# Patient Record
Sex: FEMALE | Race: WHITE | Employment: FULL TIME | URBAN - METROPOLITAN AREA
[De-identification: names, ages, dates, MRNs, and addresses within clinical notes are randomized per-mention and may not be internally consistent; named-entity substitution may affect disease eponyms.]

---

## 2017-02-05 ENCOUNTER — OFFICE VISIT (OUTPATIENT)
Dept: FAMILY MEDICINE CLINIC | Facility: CLINIC | Age: 42
End: 2017-02-05

## 2017-02-05 VITALS
RESPIRATION RATE: 18 BRPM | OXYGEN SATURATION: 98 % | WEIGHT: 180 LBS | DIASTOLIC BLOOD PRESSURE: 62 MMHG | TEMPERATURE: 99 F | HEART RATE: 107 BPM | SYSTOLIC BLOOD PRESSURE: 104 MMHG | BODY MASS INDEX: 30 KG/M2

## 2017-02-05 DIAGNOSIS — J02.0 STREP PHARYNGITIS: Primary | ICD-10-CM

## 2017-02-05 LAB
CONTROL LINE PRESENT WITH A CLEAR BACKGROUND (YES/NO): YES YES/NO
STREP GRP A CUL-SCR: POSITIVE

## 2017-02-05 PROCEDURE — 99213 OFFICE O/P EST LOW 20 MIN: CPT | Performed by: NURSE PRACTITIONER

## 2017-02-05 PROCEDURE — 87880 STREP A ASSAY W/OPTIC: CPT | Performed by: NURSE PRACTITIONER

## 2017-02-05 RX ORDER — AMOXICILLIN AND CLAVULANATE POTASSIUM 875; 125 MG/1; MG/1
1 TABLET, FILM COATED ORAL 2 TIMES DAILY
Qty: 20 TABLET | Refills: 0 | Status: SHIPPED | OUTPATIENT
Start: 2017-02-05 | End: 2017-02-15

## 2017-02-05 RX ORDER — CODEINE PHOSPHATE AND GUAIFENESIN 10; 100 MG/5ML; MG/5ML
SOLUTION ORAL
Qty: 180 ML | Refills: 0 | Status: SHIPPED | OUTPATIENT
Start: 2017-02-05 | End: 2017-02-14 | Stop reason: ALTCHOICE

## 2017-02-05 NOTE — PATIENT INSTRUCTIONS
Pharyngitis: Strep (Confirmed)       You have had a positive test for strep throat. Strep throat is a contagious illness. It is spread by coughing, kissing or by touching others after touching your mouth or nose.  Symptoms include throat pain which is wor · Inability to swallow liquids, excessive drooling, or inability to open mouth wide due to throat pain  · Signs of dehydration (very dark urine or no urine, sunken eyes, dizziness)  · Trouble breathing or noisy breathing  · Muffled voice  · New rash  Date

## 2017-02-05 NOTE — PROGRESS NOTES
CHIEF COMPLAINT:   Patient presents with:  Sore Throat  Fever      HPI:   Fabienne Archer is a 37year old female presents to clinic with symptoms of sore throat. Patient has had for 2 days. Symptoms have worsening since onset.   Patient reports following HEENT: denies ear pain or difficulty swallowing/eating, See HPI  RESPIRATORY: denies shortness of breath, or wheezing  CARDIOVASCULAR: denies chest pain, palpitations   GI: denies abdominal pain, constipation and diarrhea  NEURO: denies dizziness or lighth You have had a positive test for strep throat. Strep throat is a contagious illness. It is spread by coughing, kissing or by touching others after touching your mouth or nose.  Symptoms include throat pain which is worse with swallowing, aching all over, he · Inability to swallow liquids, excessive drooling, or inability to open mouth wide due to throat pain  · Signs of dehydration (very dark urine or no urine, sunken eyes, dizziness)  · Trouble breathing or noisy breathing  · Muffled voice  · New rash  Date

## 2017-02-14 ENCOUNTER — OFFICE VISIT (OUTPATIENT)
Dept: FAMILY MEDICINE CLINIC | Facility: CLINIC | Age: 42
End: 2017-02-14

## 2017-02-14 VITALS
TEMPERATURE: 98 F | HEART RATE: 68 BPM | RESPIRATION RATE: 16 BRPM | BODY MASS INDEX: 30 KG/M2 | DIASTOLIC BLOOD PRESSURE: 84 MMHG | WEIGHT: 178 LBS | SYSTOLIC BLOOD PRESSURE: 118 MMHG

## 2017-02-14 DIAGNOSIS — M79.602 PAIN IN BOTH UPPER EXTREMITIES: Primary | ICD-10-CM

## 2017-02-14 DIAGNOSIS — M79.642 PAIN IN BOTH HANDS: ICD-10-CM

## 2017-02-14 DIAGNOSIS — M25.532 PAIN IN BOTH WRISTS: ICD-10-CM

## 2017-02-14 DIAGNOSIS — M79.641 PAIN IN BOTH HANDS: ICD-10-CM

## 2017-02-14 DIAGNOSIS — M79.601 PAIN IN BOTH UPPER EXTREMITIES: Primary | ICD-10-CM

## 2017-02-14 DIAGNOSIS — M25.531 PAIN IN BOTH WRISTS: ICD-10-CM

## 2017-02-14 PROCEDURE — 99213 OFFICE O/P EST LOW 20 MIN: CPT | Performed by: FAMILY MEDICINE

## 2017-02-14 NOTE — PROGRESS NOTES
Martine Alas is a 37year old female. CHIEF COMPLAINT   Ongoing problems with arms. HPI:   Works as a massage therapist and doing facials for 17 years. Has done PT/OT in the past for pain in arms/hands - didn't get much relief.   Pain primarily in fore hands  Pain in both wrists    No orders of the defined types were placed in this encounter.        Meds & Refills for this Visit:  No prescriptions requested or ordered in this encounter    Imaging & Consults:  ORTHOPEDIC - INTERNAL   (refer to physiatry -

## 2017-02-23 ENCOUNTER — APPOINTMENT (OUTPATIENT)
Dept: LAB | Age: 42
End: 2017-02-23
Attending: FAMILY MEDICINE
Payer: COMMERCIAL

## 2017-02-23 DIAGNOSIS — E55.9 VITAMIN D DEFICIENCY: ICD-10-CM

## 2017-02-23 LAB — 25-HYDROXYVITAMIN D (TOTAL): 49.5 NG/ML (ref 30–100)

## 2017-02-23 PROCEDURE — 36415 COLL VENOUS BLD VENIPUNCTURE: CPT

## 2017-02-23 PROCEDURE — 82306 VITAMIN D 25 HYDROXY: CPT

## 2017-02-27 DIAGNOSIS — R79.89 LOW VITAMIN D LEVEL: Primary | ICD-10-CM

## 2017-05-19 ENCOUNTER — TELEPHONE (OUTPATIENT)
Dept: FAMILY MEDICINE CLINIC | Facility: CLINIC | Age: 42
End: 2017-05-19

## 2017-05-19 NOTE — TELEPHONE ENCOUNTER
Call from pt requesting appt with cori TAVERAS.  sts \"have had indigestion/heartburn at times for at least the past year but it has been worse the past 2 months-more constant-not full blown intolerable at this point, but uncomfortable.  Was at that point c

## 2017-05-22 ENCOUNTER — OFFICE VISIT (OUTPATIENT)
Dept: FAMILY MEDICINE CLINIC | Facility: CLINIC | Age: 42
End: 2017-05-22

## 2017-05-22 VITALS
DIASTOLIC BLOOD PRESSURE: 80 MMHG | TEMPERATURE: 98 F | BODY MASS INDEX: 30.32 KG/M2 | WEIGHT: 182 LBS | HEART RATE: 64 BPM | RESPIRATION RATE: 12 BRPM | HEIGHT: 65 IN | SYSTOLIC BLOOD PRESSURE: 110 MMHG

## 2017-05-22 DIAGNOSIS — R10.13 EPIGASTRIC PAIN: ICD-10-CM

## 2017-05-22 DIAGNOSIS — K21.9 GASTROESOPHAGEAL REFLUX DISEASE WITHOUT ESOPHAGITIS: Primary | ICD-10-CM

## 2017-05-22 PROCEDURE — 99214 OFFICE O/P EST MOD 30 MIN: CPT | Performed by: FAMILY MEDICINE

## 2017-05-22 RX ORDER — ESOMEPRAZOLE MAGNESIUM 40 MG/1
40 CAPSULE, DELAYED RELEASE ORAL DAILY
Qty: 30 CAPSULE | Refills: 1 | Status: SHIPPED | OUTPATIENT
Start: 2017-05-22 | End: 2017-08-20

## 2017-05-22 NOTE — PROGRESS NOTES
Srini Barnhart is a 37year old female. CHIEF COMPLAINT   \"heartburn\"  HPI:   Historically will get GERD symptoms 5x per year. 1 month ago had it for about 6 hours - was so severe that she was pacing and sweating. Feels bloated \"all the time\".   Fee reflux disease without esophagitis  (primary encounter diagnosis)  Epigastric pain    No orders of the defined types were placed in this encounter.        Meds & Refills for this Visit:  Signed Prescriptions Disp Refills    Esomeprazole Magnesium (NEXIUM) 4

## 2017-06-01 ENCOUNTER — HOSPITAL ENCOUNTER (OUTPATIENT)
Dept: ULTRASOUND IMAGING | Facility: HOSPITAL | Age: 42
Discharge: HOME | End: 2017-06-01
Attending: FAMILY MEDICINE
Payer: COMMERCIAL

## 2017-06-01 DIAGNOSIS — R10.13 EPIGASTRIC PAIN: ICD-10-CM

## 2017-06-01 PROCEDURE — 76700 US EXAM ABDOM COMPLETE: CPT | Performed by: FAMILY MEDICINE

## 2017-08-20 ENCOUNTER — OFFICE VISIT (OUTPATIENT)
Dept: FAMILY MEDICINE CLINIC | Facility: CLINIC | Age: 42
End: 2017-08-20

## 2017-08-20 DIAGNOSIS — J01.00 ACUTE NON-RECURRENT MAXILLARY SINUSITIS: Primary | ICD-10-CM

## 2017-08-20 PROCEDURE — 99213 OFFICE O/P EST LOW 20 MIN: CPT | Performed by: NURSE PRACTITIONER

## 2017-08-20 RX ORDER — AMOXICILLIN AND CLAVULANATE POTASSIUM 875; 125 MG/1; MG/1
1 TABLET, FILM COATED ORAL 2 TIMES DAILY
Qty: 20 TABLET | Refills: 0 | Status: SHIPPED | OUTPATIENT
Start: 2017-08-20 | End: 2017-08-30

## 2017-08-20 NOTE — PATIENT INSTRUCTIONS
Sinusitis (Antibiotic Treatment)    The sinuses are air-filled spaces within the bones of the face. They connect to the inside of the nose. Sinusitis is an inflammation of the tissue lining the sinus cavity. Sinus inflammation can occur during a cold.  It your health care provider. Rinsing may spread the infection to other sinuses. · Use acetaminophen or ibuprofen to control pain, unless another pain medicine was prescribed.  (If you have chronic liver or kidney disease or ever had a stomach ulcer, talk wit

## 2017-08-21 VITALS
TEMPERATURE: 99 F | DIASTOLIC BLOOD PRESSURE: 70 MMHG | SYSTOLIC BLOOD PRESSURE: 108 MMHG | HEART RATE: 72 BPM | RESPIRATION RATE: 16 BRPM

## 2017-08-22 NOTE — PROGRESS NOTES
CHIEF COMPLAINT:   \" Patient presents with:  Cold    HPI:   Wiley Cox is a 37year old female who presents with hx of cold sx which progressed to Maxillary sinus congestion and pressure and ear congestion and pressure.   Pt has been unable to blow out normocephalic. EYES: conjunctiva clear, EOM intact  EARS: TM's are fluid-filled, bulging and non-injected, bilaterally  NOSE: No exudates, nasal mucosa is erythematous and swollen.   Tender Maxillary sinuses  THROAT: Oral mucosa pink, moist. Posterior pha

## 2017-09-05 RX ORDER — ETHINYL ESTRADIOL AND LEVONORGESTREL 150-30(84)
KIT ORAL
Qty: 91 TABLET | Refills: 0 | Status: SHIPPED | OUTPATIENT
Start: 2017-09-05 | End: 2017-10-12

## 2017-10-12 ENCOUNTER — OFFICE VISIT (OUTPATIENT)
Dept: OBGYN CLINIC | Facility: CLINIC | Age: 42
End: 2017-10-12

## 2017-10-12 VITALS
HEIGHT: 65 IN | BODY MASS INDEX: 30.16 KG/M2 | WEIGHT: 181 LBS | DIASTOLIC BLOOD PRESSURE: 64 MMHG | RESPIRATION RATE: 18 BRPM | HEART RATE: 72 BPM | SYSTOLIC BLOOD PRESSURE: 112 MMHG

## 2017-10-12 DIAGNOSIS — Z01.419 ENCOUNTER FOR WELL WOMAN EXAM WITH ROUTINE GYNECOLOGICAL EXAM: Primary | ICD-10-CM

## 2017-10-12 DIAGNOSIS — Z12.39 BREAST CANCER SCREENING: ICD-10-CM

## 2017-10-12 PROCEDURE — 88175 CYTOPATH C/V AUTO FLUID REDO: CPT | Performed by: OBSTETRICS & GYNECOLOGY

## 2017-10-12 PROCEDURE — 99396 PREV VISIT EST AGE 40-64: CPT | Performed by: OBSTETRICS & GYNECOLOGY

## 2017-10-12 PROCEDURE — 87624 HPV HI-RISK TYP POOLED RSLT: CPT | Performed by: OBSTETRICS & GYNECOLOGY

## 2017-10-12 RX ORDER — LEVONORGESTREL / ETHINYL ESTRADIOL AND ETHINYL ESTRADIOL 150-30(84)
1 KIT ORAL
Qty: 91 TABLET | Refills: 3 | Status: SHIPPED | OUTPATIENT
Start: 2017-10-12 | End: 2018-02-21

## 2017-10-12 NOTE — PROGRESS NOTES
Aniket Calixto is a 40year old female  No LMP recorded. Patient is not currently having periods (Reason: Continuous Pill).  Patient presents with:  Wellness Visit: annual  .  No complaints    OBSTETRICS HISTORY:  OB History    Para Term [facility] Fluticasone Propionate 50 MCG/ACT Nasal Suspension, 2 sprays in each nostril daily as needed, Disp: 1 Bottle, Rfl: 11  •  Fluocinonide 0.05 % Apply Externally Solution, Apply once daily prn. Max 2 weeks. , Disp: 60 mL, Rfl: 5    ALLERGIES:  No Known Allerg tenderness  Adnexa: normal without masses or tenderness  Perineum: normal  Anus: no hemorroids     Assessment & Plan:  Diagnoses and all orders for this visit:    Encounter for well woman exam with routine gynecological exam  -     THINPREP PAP SMEAR B; Fu

## 2017-11-22 ENCOUNTER — OFFICE VISIT (OUTPATIENT)
Dept: OBGYN CLINIC | Facility: CLINIC | Age: 42
End: 2017-11-22

## 2017-11-22 VITALS
SYSTOLIC BLOOD PRESSURE: 126 MMHG | HEIGHT: 65 IN | HEART RATE: 64 BPM | WEIGHT: 182 LBS | DIASTOLIC BLOOD PRESSURE: 70 MMHG | BODY MASS INDEX: 30.32 KG/M2 | RESPIRATION RATE: 18 BRPM

## 2017-11-22 DIAGNOSIS — N90.7 INCLUSION CYST OF VULVA: Primary | ICD-10-CM

## 2017-11-22 PROCEDURE — 99213 OFFICE O/P EST LOW 20 MIN: CPT | Performed by: OBSTETRICS & GYNECOLOGY

## 2017-11-22 NOTE — PROGRESS NOTES
Lashay Atwood is a 40year old female  No LMP recorded (approximate). Patient is not currently having periods (Reason: Continuous Pill). Patient presents with:  Vaginal Problem: cyst vaginal area since 1 week ago.   .  Patient noticed tender cystic 1 tablet (10 mg total) by mouth once daily. , Disp: 90 tablet, Rfl: 1  •  Fluticasone Propionate 50 MCG/ACT Nasal Suspension, 2 sprays in each nostril daily as needed, Disp: 1 Bottle, Rfl: 11  •  Fluocinonide 0.05 % Apply Externally Solution, Apply once tanika

## 2017-11-27 ENCOUNTER — HOSPITAL ENCOUNTER (OUTPATIENT)
Dept: MAMMOGRAPHY | Age: 42
Discharge: HOME | End: 2017-11-27
Attending: OBSTETRICS & GYNECOLOGY
Payer: COMMERCIAL

## 2017-11-27 DIAGNOSIS — Z12.39 BREAST CANCER SCREENING: ICD-10-CM

## 2017-11-27 PROCEDURE — 77067 SCR MAMMO BI INCL CAD: CPT | Performed by: OBSTETRICS & GYNECOLOGY

## 2017-12-04 RX ORDER — LEVONORGESTREL AND ETHINYL ESTRADIOL AND ETHINYL ESTRADIOL 150-30(84)
KIT ORAL
Qty: 91 TABLET | Refills: 0 | OUTPATIENT
Start: 2017-12-04

## 2017-12-27 ENCOUNTER — OFFICE VISIT (OUTPATIENT)
Dept: FAMILY MEDICINE CLINIC | Facility: CLINIC | Age: 42
End: 2017-12-27

## 2017-12-27 VITALS
OXYGEN SATURATION: 98 % | RESPIRATION RATE: 16 BRPM | BODY MASS INDEX: 30.49 KG/M2 | HEIGHT: 65 IN | SYSTOLIC BLOOD PRESSURE: 134 MMHG | DIASTOLIC BLOOD PRESSURE: 86 MMHG | WEIGHT: 183 LBS | HEART RATE: 106 BPM | TEMPERATURE: 100 F

## 2017-12-27 DIAGNOSIS — J22 ACUTE LOWER RESPIRATORY TRACT INFECTION: Primary | ICD-10-CM

## 2017-12-27 DIAGNOSIS — R05.8 PRODUCTIVE COUGH: ICD-10-CM

## 2017-12-27 PROCEDURE — 99213 OFFICE O/P EST LOW 20 MIN: CPT | Performed by: NURSE PRACTITIONER

## 2017-12-27 RX ORDER — AZITHROMYCIN 250 MG/1
TABLET, FILM COATED ORAL
Qty: 6 TABLET | Refills: 0 | Status: SHIPPED | OUTPATIENT
Start: 2017-12-27 | End: 2018-02-21 | Stop reason: ALTCHOICE

## 2017-12-27 NOTE — PATIENT INSTRUCTIONS
Symptoms should start to get better during the first 2 days of treatment. Follow up if you are not fever free in 48 hours.      Home care  Follow these guidelines when caring for yourself at home:  · Rest at home for the first 2 to 3 days, or until you fe Call your healthcare provider right away if any of these occur:  · You don’t get better within the first 48 hours of treatment  · Shortness of breath gets worse  · Rapid breathing (more than 25 breaths per minute)  · Coughing up blood  · Chest pain gets wo

## 2017-12-27 NOTE — PROGRESS NOTES
CHIEF COMPLAINT:   Patient presents with:  Sore Throat  Cough        HPI:   Lashay Atwood is a 40year old female who presents for cough for  3  days. Cough has been worsening; chest feels congested.  Cough is occas productive of purulent yellow/tan foul HENT: Denies ear pain or decreased hearing. See HPI  CARDIOVASCULAR: Denies chest pain or palpitations  LUNGS: Per HPI. GI: Denies N/V/C/D or abdominal pain.       EXAM:   /86   Pulse 106   Temp 99.8 °F (37.7 °C) (Oral)   Resp 16   Ht 65\"   Wt 18 · Rest at home for the first 2 to 3 days, or until you feel stronger. Don’t let yourself get overly tired when you go back to your activities. · Stay away from cigarette smoke – yours or other people’s.   · You may use acetaminophen or ibuprofen to control · Rapid breathing (more than 25 breaths per minute)  · Coughing up blood  · Chest pain gets worse with breathing  · Fever of 100.4°F (38°C) or higher that doesn’t get better with fever medicine  · Weakness, dizziness, or fainting that gets worse  · Thirst

## 2018-02-21 ENCOUNTER — HOSPITAL ENCOUNTER (OUTPATIENT)
Age: 43
Discharge: HOME | End: 2018-02-21
Attending: FAMILY MEDICINE
Payer: COMMERCIAL

## 2018-02-21 VITALS
HEART RATE: 95 BPM | RESPIRATION RATE: 16 BRPM | WEIGHT: 175 LBS | OXYGEN SATURATION: 99 % | BODY MASS INDEX: 29.16 KG/M2 | SYSTOLIC BLOOD PRESSURE: 117 MMHG | DIASTOLIC BLOOD PRESSURE: 91 MMHG | TEMPERATURE: 100 F | HEIGHT: 65 IN

## 2018-02-21 DIAGNOSIS — S29.012A UPPER BACK STRAIN, INITIAL ENCOUNTER: Primary | ICD-10-CM

## 2018-02-21 PROCEDURE — 99213 OFFICE O/P EST LOW 20 MIN: CPT

## 2018-02-21 PROCEDURE — 99204 OFFICE O/P NEW MOD 45 MIN: CPT

## 2018-02-21 RX ORDER — CYCLOBENZAPRINE HCL 10 MG
10 TABLET ORAL NIGHTLY
Qty: 10 TABLET | Refills: 0 | Status: SHIPPED | OUTPATIENT
Start: 2018-02-21 | End: 2018-03-03

## 2018-02-21 RX ORDER — NAPROXEN 500 MG/1
500 TABLET ORAL 2 TIMES DAILY PRN
Qty: 20 TABLET | Refills: 0 | Status: SHIPPED | OUTPATIENT
Start: 2018-02-21 | End: 2018-02-28

## 2018-02-21 RX ORDER — ETHINYL ESTRADIOL AND LEVONORGESTREL 150-30(84)
KIT ORAL
Qty: 91 TABLET | Refills: 2 | Status: SHIPPED | OUTPATIENT
Start: 2018-02-21 | End: 2018-10-31
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
## 2018-02-24 ENCOUNTER — OFFICE VISIT (OUTPATIENT)
Dept: FAMILY MEDICINE CLINIC | Facility: CLINIC | Age: 43
End: 2018-02-24

## 2018-02-24 VITALS
SYSTOLIC BLOOD PRESSURE: 128 MMHG | RESPIRATION RATE: 20 BRPM | BODY MASS INDEX: 29.16 KG/M2 | HEIGHT: 65 IN | TEMPERATURE: 98 F | DIASTOLIC BLOOD PRESSURE: 70 MMHG | OXYGEN SATURATION: 98 % | HEART RATE: 106 BPM | WEIGHT: 175 LBS

## 2018-02-24 DIAGNOSIS — J01.00 ACUTE MAXILLARY SINUSITIS, RECURRENCE NOT SPECIFIED: Primary | ICD-10-CM

## 2018-02-24 DIAGNOSIS — J40 BRONCHITIS: ICD-10-CM

## 2018-02-24 PROCEDURE — 99213 OFFICE O/P EST LOW 20 MIN: CPT | Performed by: NURSE PRACTITIONER

## 2018-02-24 RX ORDER — ALBUTEROL SULFATE 90 UG/1
AEROSOL, METERED RESPIRATORY (INHALATION)
Qty: 1 INHALER | Refills: 0 | Status: SHIPPED | OUTPATIENT
Start: 2018-02-24 | End: 2018-07-23 | Stop reason: ALTCHOICE

## 2018-02-24 RX ORDER — CEFDINIR 300 MG/1
300 CAPSULE ORAL 2 TIMES DAILY
Qty: 20 CAPSULE | Refills: 0 | Status: SHIPPED | OUTPATIENT
Start: 2018-02-24 | End: 2018-04-30 | Stop reason: ALTCHOICE

## 2018-02-24 NOTE — PROGRESS NOTES
CHIEF COMPLAINT:   Patient presents with:  Sinus Problem  Sore Throat  Cough      HPI:   Aniket Calixto is a 40year old female who presents for cold symptoms for  2  weeks. Symptoms have progressed into sinus congestion and been worsening since onset.  Sin headaches. No numbness or tingling in face.     EXAM:   /70   Pulse 106   Temp 98 °F (36.7 °C) (Oral)   Resp 20   Ht 65\"   Wt 175 lb   SpO2 98%   BMI 29.12 kg/m²   GENERAL: well developed, well nourished,in no apparent distress  SKIN: no rashes,no s

## 2018-04-30 ENCOUNTER — OFFICE VISIT (OUTPATIENT)
Dept: FAMILY MEDICINE CLINIC | Facility: CLINIC | Age: 43
End: 2018-04-30

## 2018-04-30 VITALS
HEIGHT: 65 IN | WEIGHT: 183 LBS | OXYGEN SATURATION: 99 % | RESPIRATION RATE: 12 BRPM | BODY MASS INDEX: 30.49 KG/M2 | DIASTOLIC BLOOD PRESSURE: 70 MMHG | SYSTOLIC BLOOD PRESSURE: 112 MMHG | HEART RATE: 80 BPM | TEMPERATURE: 98 F

## 2018-04-30 DIAGNOSIS — I10 BENIGN ESSENTIAL HYPERTENSION: Primary | ICD-10-CM

## 2018-04-30 DIAGNOSIS — R05.9 COUGH: ICD-10-CM

## 2018-04-30 DIAGNOSIS — Z91.09 ENVIRONMENTAL ALLERGIES: ICD-10-CM

## 2018-04-30 DIAGNOSIS — Z12.83 SKIN CANCER SCREENING: ICD-10-CM

## 2018-04-30 PROCEDURE — 99214 OFFICE O/P EST MOD 30 MIN: CPT | Performed by: FAMILY MEDICINE

## 2018-04-30 RX ORDER — LISINOPRIL 10 MG/1
10 TABLET ORAL DAILY
Qty: 90 TABLET | Refills: 1 | Status: SHIPPED | OUTPATIENT
Start: 2018-04-30 | End: 2018-11-05

## 2018-04-30 RX ORDER — MONTELUKAST SODIUM 10 MG/1
10 TABLET ORAL DAILY
Qty: 90 TABLET | Refills: 3 | Status: SHIPPED | OUTPATIENT
Start: 2018-04-30 | End: 2018-07-23 | Stop reason: ALTCHOICE

## 2018-04-30 RX ORDER — METHYLPREDNISOLONE 4 MG/1
TABLET ORAL
Qty: 1 KIT | Refills: 0 | Status: SHIPPED | OUTPATIENT
Start: 2018-04-30 | End: 2018-07-23 | Stop reason: ALTCHOICE

## 2018-04-30 RX ORDER — LISINOPRIL 10 MG/1
10 TABLET ORAL DAILY
COMMUNITY
End: 2018-04-30

## 2018-04-30 RX ORDER — FLUTICASONE PROPIONATE 50 MCG
2 SPRAY, SUSPENSION (ML) NASAL DAILY
Qty: 3 BOTTLE | Refills: 3 | Status: SHIPPED | OUTPATIENT
Start: 2018-04-30 | End: 2018-11-09

## 2018-04-30 NOTE — PROGRESS NOTES
CHIEF COMPLAINT:   Still with cough/congestion  HPI:   Got sick over Christmas - symptoms got better and then was sick again in February. Never seemed to completely get better. Still feels like she can't take a deep breath, thick productive cough.   Nasal Smokeless tobacco: Never Used                      Alcohol use:  No                  REVIEW OF SYSTEMS:   GENERAL: feels well otherwise  SKIN: no rashes  EYES:denies blurred vision or double vision  HEENT: congested  LUNGS: denies shortness o

## 2018-07-23 ENCOUNTER — TELEPHONE (OUTPATIENT)
Dept: FAMILY MEDICINE CLINIC | Facility: CLINIC | Age: 43
End: 2018-07-23

## 2018-07-23 ENCOUNTER — APPOINTMENT (OUTPATIENT)
Dept: LAB | Age: 43
End: 2018-07-23
Attending: NURSE PRACTITIONER
Payer: COMMERCIAL

## 2018-07-23 ENCOUNTER — OFFICE VISIT (OUTPATIENT)
Dept: FAMILY MEDICINE CLINIC | Facility: CLINIC | Age: 43
End: 2018-07-23
Payer: COMMERCIAL

## 2018-07-23 VITALS
HEART RATE: 74 BPM | SYSTOLIC BLOOD PRESSURE: 120 MMHG | WEIGHT: 184 LBS | HEIGHT: 65 IN | BODY MASS INDEX: 30.66 KG/M2 | DIASTOLIC BLOOD PRESSURE: 82 MMHG | RESPIRATION RATE: 15 BRPM | TEMPERATURE: 98 F

## 2018-07-23 DIAGNOSIS — H81.11 BENIGN PAROXYSMAL POSITIONAL VERTIGO OF RIGHT EAR: ICD-10-CM

## 2018-07-23 DIAGNOSIS — H81.11 BENIGN PAROXYSMAL POSITIONAL VERTIGO OF RIGHT EAR: Primary | ICD-10-CM

## 2018-07-23 LAB
ALBUMIN SERPL-MCNC: 3.8 G/DL (ref 3.5–4.8)
ALBUMIN/GLOB SERPL: 1.1 {RATIO} (ref 1–2)
ALP LIVER SERPL-CCNC: 83 U/L (ref 37–98)
ALT SERPL-CCNC: 32 U/L (ref 14–54)
ANION GAP SERPL CALC-SCNC: 7 MMOL/L (ref 0–18)
AST SERPL-CCNC: 16 U/L (ref 15–41)
BILIRUB SERPL-MCNC: 0.4 MG/DL (ref 0.1–2)
BUN BLD-MCNC: 8 MG/DL (ref 8–20)
BUN/CREAT SERPL: 12.1 (ref 10–20)
CALCIUM BLD-MCNC: 9 MG/DL (ref 8.3–10.3)
CHLORIDE SERPL-SCNC: 107 MMOL/L (ref 101–111)
CO2 SERPL-SCNC: 26 MMOL/L (ref 22–32)
CREAT BLD-MCNC: 0.66 MG/DL (ref 0.55–1.02)
GLOBULIN PLAS-MCNC: 3.6 G/DL (ref 2.5–3.7)
GLUCOSE BLD-MCNC: 94 MG/DL (ref 70–99)
M PROTEIN MFR SERPL ELPH: 7.4 G/DL (ref 6.1–8.3)
OSMOLALITY SERPL CALC.SUM OF ELEC: 288 MOSM/KG (ref 275–295)
POTASSIUM SERPL-SCNC: 3.7 MMOL/L (ref 3.6–5.1)
SODIUM SERPL-SCNC: 140 MMOL/L (ref 136–144)

## 2018-07-23 PROCEDURE — 36415 COLL VENOUS BLD VENIPUNCTURE: CPT

## 2018-07-23 PROCEDURE — 99213 OFFICE O/P EST LOW 20 MIN: CPT | Performed by: NURSE PRACTITIONER

## 2018-07-23 PROCEDURE — 80053 COMPREHEN METABOLIC PANEL: CPT

## 2018-07-23 RX ORDER — MECLIZINE HYDROCHLORIDE 25 MG/1
25 TABLET ORAL 3 TIMES DAILY PRN
Qty: 30 TABLET | Refills: 0 | Status: SHIPPED | OUTPATIENT
Start: 2018-07-23 | End: 2019-02-25

## 2018-07-23 NOTE — PROGRESS NOTES
Aniket Calixto is a 40year old female. HPI:   Patient presents today reporting experiencing an episode of vertigo 4 nights ago.  She reports that she was lying down sleeping on her back and rolled over to the right side-when she did this she was awoken by exertion  CARDIOVASCULAR: denies chest pain on exertion  NEURO: denies headaches- reports dizziness and feeling \"off balance\"- reports dizziness worse at night SEE HPI  Musculoskeletal: No motor deficits  EXAM:   /82   Pulse 74   Temp 98.4 °F (36.9 new or worsening symptoms.

## 2018-07-23 NOTE — TELEPHONE ENCOUNTER
1. What are your symptoms?  vertigo        2. How long have you been having these symptoms? Thursday night, woke her up in deep sleep        3. Have you done anything already to treat your symptoms?    Motion sickness med        ADDITIONAL INFO:

## 2018-07-23 NOTE — TELEPHONE ENCOUNTER
Pt states she has had dizziness x 4 days, worse when she is flat and can not move her head to the right or it gets worse. Moving very slowly, denies nausea or vomiting or clogged ears or sinus congestion. Motion sickness med worked slightly.

## 2018-07-23 NOTE — TELEPHONE ENCOUNTER
Call to pt-advised of ocri TAVERAS recommendation for nitzat jagjit palencia/nurse practitioner, as cori is now finished in ofc for today. Patient voices understanding/agrees with plan/no further questions.    sts she is at work at [employer] center/[employer]

## 2018-10-23 NOTE — TELEPHONE ENCOUNTER
Pt states that we haven't been taking the Lisinopril, her BP has not been high. Does she still need to come in for anything else?

## 2018-10-31 ENCOUNTER — TELEPHONE (OUTPATIENT)
Dept: OBGYN CLINIC | Facility: CLINIC | Age: 43
End: 2018-10-31

## 2018-10-31 RX ORDER — LEVONORGESTREL / ETHINYL ESTRADIOL AND ETHINYL ESTRADIOL 150-30(84)
1 KIT ORAL
Qty: 91 TABLET | Refills: 0 | Status: SHIPPED | OUTPATIENT
Start: 2018-10-31 | End: 2019-01-29

## 2018-10-31 RX ORDER — LEVONORGESTREL / ETHINYL ESTRADIOL AND ETHINYL ESTRADIOL 150-30(84)
1 KIT ORAL
Qty: 91 TABLET | Refills: 0 | OUTPATIENT
Start: 2018-10-31

## 2018-10-31 NOTE — TELEPHONE ENCOUNTER
Per pt she just made her wwe appt to see dr GARCIA on 11-05-18. Pt is wondering if we can prescribe a 1 month worth of her bc and send it to her [facility] pharmacy in [place]. please advise and let pt know if possible.  Thanks

## 2018-11-05 ENCOUNTER — OFFICE VISIT (OUTPATIENT)
Dept: OBGYN CLINIC | Facility: CLINIC | Age: 43
End: 2018-11-05
Payer: COMMERCIAL

## 2018-11-05 VITALS
HEIGHT: 65 IN | HEART RATE: 72 BPM | DIASTOLIC BLOOD PRESSURE: 70 MMHG | SYSTOLIC BLOOD PRESSURE: 126 MMHG | WEIGHT: 185 LBS | RESPIRATION RATE: 18 BRPM | BODY MASS INDEX: 30.82 KG/M2

## 2018-11-05 DIAGNOSIS — Z12.4 CERVICAL CANCER SCREENING: ICD-10-CM

## 2018-11-05 DIAGNOSIS — Z12.39 BREAST CANCER SCREENING: ICD-10-CM

## 2018-11-05 DIAGNOSIS — Z01.419 ENCOUNTER FOR WELL WOMAN EXAM WITH ROUTINE GYNECOLOGICAL EXAM: Primary | ICD-10-CM

## 2018-11-05 DIAGNOSIS — L73.1 INGROWN HAIR: ICD-10-CM

## 2018-11-05 PROCEDURE — 88175 CYTOPATH C/V AUTO FLUID REDO: CPT | Performed by: OBSTETRICS & GYNECOLOGY

## 2018-11-05 PROCEDURE — 99396 PREV VISIT EST AGE 40-64: CPT | Performed by: OBSTETRICS & GYNECOLOGY

## 2018-11-05 PROCEDURE — 87624 HPV HI-RISK TYP POOLED RSLT: CPT | Performed by: OBSTETRICS & GYNECOLOGY

## 2018-11-05 RX ORDER — AMOXICILLIN AND CLAVULANATE POTASSIUM 875; 125 MG/1; MG/1
1 TABLET, FILM COATED ORAL 2 TIMES DAILY
Qty: 14 TABLET | Refills: 0 | Status: SHIPPED | OUTPATIENT
Start: 2018-11-05 | End: 2019-02-25

## 2018-11-05 NOTE — PROGRESS NOTES
Kandis Giordano is a 44year old female  No LMP recorded (approximate). Patient is not currently having periods (Reason: Continuous Pill).  Patient presents with:  Wellness Visit: annual  .patient c/o tender lump in left groin for 2 days      OBSTETRI Occupational Exposure: Not Asked        Hobby Hazards: Not Asked        Sleep Concern: Not Asked        Stress Concern: Not Asked        Weight Concern: Not Asked        Special Diet: Not Asked        Back Care: Not Asked        Exercise: Yes          once normocephalic  Neck/Thyroid: thyroid symmetric, no thyromegaly, no nodules, no adenopathy  Lymphatic:no abnormal supraclavicular or axillary adenopathy is noted  Breast: normal without palpable masses, tenderness, asymmetry, nipple discharge, nipple retrac

## 2018-11-07 RX ORDER — LISINOPRIL 10 MG/1
TABLET ORAL
Qty: 90 TABLET | Refills: 1 | OUTPATIENT
Start: 2018-11-07

## 2018-11-08 ENCOUNTER — OFFICE VISIT (OUTPATIENT)
Dept: SURGERY | Facility: CLINIC | Age: 43
End: 2018-11-08
Payer: COMMERCIAL

## 2018-11-08 ENCOUNTER — TELEPHONE (OUTPATIENT)
Dept: OBGYN CLINIC | Facility: CLINIC | Age: 43
End: 2018-11-08

## 2018-11-08 VITALS
HEART RATE: 87 BPM | BODY MASS INDEX: 30.82 KG/M2 | TEMPERATURE: 98 F | HEIGHT: 65 IN | WEIGHT: 185 LBS | DIASTOLIC BLOOD PRESSURE: 89 MMHG | SYSTOLIC BLOOD PRESSURE: 156 MMHG

## 2018-11-08 DIAGNOSIS — L72.3 SEBACEOUS CYST: Primary | ICD-10-CM

## 2018-11-08 PROCEDURE — 99242 OFF/OP CONSLTJ NEW/EST SF 20: CPT | Performed by: SURGERY

## 2018-11-08 NOTE — TELEPHONE ENCOUNTER
Patient states that the area in her groin does not look better, there is puss coming out and it looks bigger. Patient instructed to call Dr. Berry Andrea office and schedule appointment for evaluation. Patient verbalizes understanding.

## 2018-11-09 ENCOUNTER — OFFICE VISIT (OUTPATIENT)
Dept: FAMILY MEDICINE CLINIC | Facility: CLINIC | Age: 43
End: 2018-11-09
Payer: COMMERCIAL

## 2018-11-09 VITALS
HEART RATE: 88 BPM | HEIGHT: 65 IN | TEMPERATURE: 99 F | OXYGEN SATURATION: 98 % | BODY MASS INDEX: 30.99 KG/M2 | RESPIRATION RATE: 18 BRPM | DIASTOLIC BLOOD PRESSURE: 92 MMHG | WEIGHT: 186 LBS | SYSTOLIC BLOOD PRESSURE: 146 MMHG

## 2018-11-09 DIAGNOSIS — G57.62 MORTON'S NEUROMA OF LEFT FOOT: ICD-10-CM

## 2018-11-09 DIAGNOSIS — M54.32 LEFT SIDED SCIATICA: Primary | ICD-10-CM

## 2018-11-09 PROCEDURE — 99213 OFFICE O/P EST LOW 20 MIN: CPT | Performed by: FAMILY MEDICINE

## 2018-11-09 RX ORDER — LISINOPRIL 10 MG/1
10 TABLET ORAL DAILY
Qty: 90 TABLET | Refills: 0 | Status: SHIPPED | OUTPATIENT
Start: 2018-11-09 | End: 2019-02-05

## 2018-11-09 NOTE — PROGRESS NOTES
Kandis Giordano is a 44year old female. HPI:   Patient is a 52-year-old female who complains of persistent pain near the ball of her left foot. She says walking on it can be painful. She denies any specific injury.   She does have a history of a left sci foot.  No masses are noted. ASSESSMENT AND PLAN:     Left sided sciatica  (primary encounter diagnosis)  Delaney's neuroma of left foot    No orders of the defined types were placed in this encounter.       Meds & Refills for this Visit:  Requested Prescr

## 2018-11-13 NOTE — H&P
New Patient Visit Note       Active Problems      1. Sebaceous cyst        Chief Complaint   Patient presents with:  Cyst: New patient referred by Dr. Gabriel Langford for left groin cyst. c/o drainage with odor. Denies any fever. Taking augmentin since Monday. Asked        Stress Concern: Not Asked        Weight Concern: Not Asked        Special Diet: Not Asked        Back Care: Not Asked        Exercise: Yes          once in a while        Bike Helmet: Not Asked        Seat Belt: Not Asked        Self-Exams: No disturbance. Physical Findings   /89   Pulse 87   Temp 98.3 °F (36.8 °C) (Oral)   Ht 65\"   Wt 185 lb   BMI 30.79 kg/m²   Physical Exam   Constitutional: She is oriented to person, place, and time. She appears well-developed and well-nourished. patient does wish to proceed with the proposed procedure. No orders of the defined types were placed in this encounter. Imaging & Referrals   None    Follow Up  No Follow-up on file.     Shawn Saeed MD

## 2019-01-29 RX ORDER — LEVONORGESTREL / ETHINYL ESTRADIOL AND ETHINYL ESTRADIOL 150-30(84)
KIT ORAL
Qty: 91 TABLET | Refills: 0 | Status: SHIPPED | OUTPATIENT
Start: 2019-01-29 | End: 2019-06-30

## 2019-02-05 RX ORDER — LISINOPRIL 10 MG/1
TABLET ORAL
Qty: 30 TABLET | Refills: 0 | Status: SHIPPED | OUTPATIENT
Start: 2019-02-05 | End: 2020-11-12

## 2019-02-25 ENCOUNTER — OFFICE VISIT (OUTPATIENT)
Dept: FAMILY MEDICINE CLINIC | Facility: CLINIC | Age: 44
End: 2019-02-25
Payer: COMMERCIAL

## 2019-02-25 VITALS
SYSTOLIC BLOOD PRESSURE: 130 MMHG | TEMPERATURE: 98 F | BODY MASS INDEX: 30.82 KG/M2 | DIASTOLIC BLOOD PRESSURE: 80 MMHG | HEART RATE: 74 BPM | WEIGHT: 185 LBS | HEIGHT: 65 IN | RESPIRATION RATE: 16 BRPM

## 2019-02-25 DIAGNOSIS — Z71.1 WORRIED WELL: ICD-10-CM

## 2019-02-25 DIAGNOSIS — L40.9 PSORIASIS OF SCALP: ICD-10-CM

## 2019-02-25 DIAGNOSIS — D22.9 MULTIPLE NEVI: ICD-10-CM

## 2019-02-25 DIAGNOSIS — R19.09 LUMP IN THE GROIN: Primary | ICD-10-CM

## 2019-02-25 DIAGNOSIS — I10 BENIGN ESSENTIAL HYPERTENSION: ICD-10-CM

## 2019-02-25 PROCEDURE — 99214 OFFICE O/P EST MOD 30 MIN: CPT | Performed by: NURSE PRACTITIONER

## 2019-02-25 RX ORDER — FLUOCINONIDE 0.5 MG/ML
SOLUTION TOPICAL
Qty: 60 ML | Refills: 2 | Status: SHIPPED | OUTPATIENT
Start: 2019-02-25

## 2019-02-25 NOTE — PROGRESS NOTES
Vera Kinney is a 44year old female. HPI:   Patient presents today to discuss a lump to her left groin that has been present for the past 6 months.   Patient reports she initially noticed this and brought this to her gynecologist attention who started h Patient denies any personal or family history of blood clots but is asking what she can do to lower her risk for a blood clot given the one-way trip is a 30+ hour drive.       Wt Readings from Last 6 Encounters:  02/25/19 : 185 lb  11/09/18 : 186 lb  11/08/ with having patient cough. No drainage.   EXTREMITIES: no cyanosis, clubbing or edema  Musculoskeletal: No gross deficit  Neurological: nerves II through XII grossly intact no sensorimotor deficit  Psychological: Mood and affect are normal.  Good communicat Tianna Isma if any new/worsening symptoms.

## 2019-02-26 ENCOUNTER — HOSPITAL ENCOUNTER (OUTPATIENT)
Dept: ULTRASOUND IMAGING | Age: 44
Discharge: HOME | End: 2019-02-26
Attending: NURSE PRACTITIONER
Payer: COMMERCIAL

## 2019-02-26 DIAGNOSIS — R19.09 LUMP IN THE GROIN: ICD-10-CM

## 2019-02-26 PROCEDURE — 76882 US LMTD JT/FCL EVL NVASC XTR: CPT | Performed by: NURSE PRACTITIONER

## 2019-02-27 ENCOUNTER — TELEPHONE (OUTPATIENT)
Dept: FAMILY MEDICINE CLINIC | Facility: CLINIC | Age: 44
End: 2019-02-27

## 2019-02-27 DIAGNOSIS — K40.90 RIGHT INGUINAL HERNIA: Primary | ICD-10-CM

## 2019-03-15 ENCOUNTER — HOSPITAL ENCOUNTER (OUTPATIENT)
Dept: MAMMOGRAPHY | Age: 44
Discharge: HOME | End: 2019-03-15
Attending: OBSTETRICS & GYNECOLOGY
Payer: COMMERCIAL

## 2019-03-15 DIAGNOSIS — Z12.39 BREAST CANCER SCREENING: ICD-10-CM

## 2019-03-15 PROCEDURE — 77063 BREAST TOMOSYNTHESIS BI: CPT | Performed by: OBSTETRICS & GYNECOLOGY

## 2019-03-15 PROCEDURE — 77067 SCR MAMMO BI INCL CAD: CPT | Performed by: OBSTETRICS & GYNECOLOGY

## 2019-07-01 RX ORDER — LEVONORGESTREL / ETHINYL ESTRADIOL AND ETHINYL ESTRADIOL 150-30(84)
KIT ORAL
Qty: 91 TABLET | Refills: 0 | Status: SHIPPED | OUTPATIENT
Start: 2019-07-01 | End: 2019-10-02

## 2019-08-15 ENCOUNTER — OFFICE VISIT (OUTPATIENT)
Dept: FAMILY MEDICINE CLINIC | Facility: CLINIC | Age: 44
End: 2019-08-15
Payer: COMMERCIAL

## 2019-08-15 VITALS
HEIGHT: 65 IN | DIASTOLIC BLOOD PRESSURE: 80 MMHG | BODY MASS INDEX: 28.32 KG/M2 | RESPIRATION RATE: 20 BRPM | SYSTOLIC BLOOD PRESSURE: 118 MMHG | TEMPERATURE: 98 F | HEART RATE: 100 BPM | WEIGHT: 170 LBS | OXYGEN SATURATION: 98 %

## 2019-08-15 DIAGNOSIS — B35.4 TINEA CORPORIS: Primary | ICD-10-CM

## 2019-08-15 PROCEDURE — 99213 OFFICE O/P EST LOW 20 MIN: CPT | Performed by: NURSE PRACTITIONER

## 2019-08-15 NOTE — PATIENT INSTRUCTIONS
Ringworm of the Skin    Ringworm is a fungal infection of the skin. Despite the name, a worm doesn't cause it. The cause of ringworm is a fungus that infects the outer layers of the skin. It is also not caused by bed bugs, scabies, or lice.  These are t · Take medicine as prescribed. If you were prescribed a cream, apply it exactly as directed. Make sure to put the cream not just on the rash, but also on the skin 1 or 2 inches around it.  Medicine by mouth is sometimes needed, particularly for ringworm on

## 2019-08-15 NOTE — PROGRESS NOTES
CHIEF COMPLAINT:   Patient presents with:  Derm Problem         HPI:    Silverio Matt is a 44year old female who presents for evaluation of a rash. Per patient rash started in the past 3  days. Rash has been \"very itchy\" since onset.   Patient denies s /80   Pulse 100   Temp 98 °F (36.7 °C)   Resp 20   Ht 65\"   Wt 170 lb   SpO2 98%   BMI 28.29 kg/m²   GENERAL: well developed, well nourished,in no apparent distress  SKIN: + macular/papular rash with erythremic base to left inguinal area.   Consisten The medical term for ringworm is tinea. It can affect most parts of your body, although it seems to do better in moist areas of the body and around hair.  It can be on almost any part of your body, including:  · Arms, hands, legs, chest, feet, and back  · S · Keep it from spreading to others. Untreated ringworm of the skin is contagious by skin-to-skin contact. Your child may return to school 2 days after treatment has started.   Prevention  To some degree, prevention depends on what part of your body was affe

## 2019-10-02 ENCOUNTER — TELEPHONE (OUTPATIENT)
Dept: OBGYN CLINIC | Facility: CLINIC | Age: 44
End: 2019-10-02

## 2019-10-02 RX ORDER — LEVONORGESTREL / ETHINYL ESTRADIOL AND ETHINYL ESTRADIOL 150-30(84)
KIT ORAL
Qty: 91 TABLET | Refills: 0 | Status: SHIPPED | OUTPATIENT
Start: 2019-10-02 | End: 2019-11-07

## 2019-10-02 NOTE — TELEPHONE ENCOUNTER
Per pt she just called to make her wwe for 11/19 to see dr GARCIA. She will run out of her bc meds and would like to know if we can give her a 2 months worth refill for her bc (generic for seasonique) send to her [facility] pharmacy. Please advise and let pt know.  Nilesh French

## 2019-11-07 ENCOUNTER — OFFICE VISIT (OUTPATIENT)
Dept: OBGYN CLINIC | Facility: CLINIC | Age: 44
End: 2019-11-07
Payer: COMMERCIAL

## 2019-11-07 VITALS
WEIGHT: 185 LBS | SYSTOLIC BLOOD PRESSURE: 150 MMHG | BODY MASS INDEX: 30.82 KG/M2 | HEIGHT: 65 IN | HEART RATE: 90 BPM | DIASTOLIC BLOOD PRESSURE: 94 MMHG | RESPIRATION RATE: 18 BRPM

## 2019-11-07 DIAGNOSIS — Z12.4 CERVICAL CANCER SCREENING: ICD-10-CM

## 2019-11-07 DIAGNOSIS — Z01.419 ENCOUNTER FOR WELL WOMAN EXAM WITH ROUTINE GYNECOLOGICAL EXAM: Primary | ICD-10-CM

## 2019-11-07 DIAGNOSIS — Z12.39 BREAST CANCER SCREENING: ICD-10-CM

## 2019-11-07 PROCEDURE — 88175 CYTOPATH C/V AUTO FLUID REDO: CPT | Performed by: OBSTETRICS & GYNECOLOGY

## 2019-11-07 PROCEDURE — 87624 HPV HI-RISK TYP POOLED RSLT: CPT | Performed by: OBSTETRICS & GYNECOLOGY

## 2019-11-07 PROCEDURE — 99396 PREV VISIT EST AGE 40-64: CPT | Performed by: OBSTETRICS & GYNECOLOGY

## 2019-11-07 RX ORDER — LEVONORGESTREL / ETHINYL ESTRADIOL AND ETHINYL ESTRADIOL 150-30(84)
1 KIT ORAL
Qty: 91 TABLET | Refills: 3 | Status: SHIPPED | OUTPATIENT
Start: 2019-11-07 | End: 2020-11-25

## 2019-11-07 RX ORDER — SERTRALINE HYDROCHLORIDE 25 MG/1
25 TABLET, FILM COATED ORAL DAILY
Qty: 30 TABLET | Refills: 11 | Status: SHIPPED | OUTPATIENT
Start: 2019-11-07 | End: 2020-10-21

## 2019-11-07 NOTE — PROGRESS NOTES
Srini Barnhart is a 38year old female  No LMP recorded. (Menstrual status: Continuous Pill). Patient presents with: Well Adult:  Well woman exam  .  Patient states she has been feeling depresses for several years, not suicidal , would like to try m connections:        Talks on phone: Not on file        Gets together: Not on file        Attends Episcopal service: Not on file        Active member of club or organization: Not on file        Attends meetings of clubs or organizations: Not on file heartburn, abdominal pain, diarrhea or constipation  Genitourinary:  denies dysuria, incontinence, abnormal vaginal discharge, vaginal itching  Musculoskeletal:  denies back pain. Skin/Breast:  Denies any breast pain, lumps, or discharge.    Neurological: Levonorgest-Eth Estrad 91-Day 0.15-0.03 &0.01 MG Oral Tab; Take 1 tablet by mouth once daily.

## 2020-05-07 ENCOUNTER — HOSPITAL ENCOUNTER (OUTPATIENT)
Dept: MAMMOGRAPHY | Age: 45
Discharge: HOME | End: 2020-05-07
Attending: OBSTETRICS & GYNECOLOGY
Payer: COMMERCIAL

## 2020-05-07 DIAGNOSIS — Z12.39 BREAST CANCER SCREENING: ICD-10-CM

## 2020-05-07 PROCEDURE — 77063 BREAST TOMOSYNTHESIS BI: CPT | Performed by: OBSTETRICS & GYNECOLOGY

## 2020-05-07 PROCEDURE — 77067 SCR MAMMO BI INCL CAD: CPT | Performed by: OBSTETRICS & GYNECOLOGY

## 2020-10-17 ENCOUNTER — OFFICE VISIT (OUTPATIENT)
Dept: FAMILY MEDICINE CLINIC | Facility: CLINIC | Age: 45
End: 2020-10-17
Payer: COMMERCIAL

## 2020-10-17 VITALS
SYSTOLIC BLOOD PRESSURE: 150 MMHG | WEIGHT: 185 LBS | TEMPERATURE: 97 F | HEART RATE: 110 BPM | OXYGEN SATURATION: 98 % | DIASTOLIC BLOOD PRESSURE: 98 MMHG | HEIGHT: 65 IN | BODY MASS INDEX: 30.82 KG/M2

## 2020-10-17 DIAGNOSIS — J01.40 ACUTE PANSINUSITIS, RECURRENCE NOT SPECIFIED: Primary | ICD-10-CM

## 2020-10-17 PROCEDURE — 3077F SYST BP >= 140 MM HG: CPT | Performed by: NURSE PRACTITIONER

## 2020-10-17 PROCEDURE — 99213 OFFICE O/P EST LOW 20 MIN: CPT | Performed by: NURSE PRACTITIONER

## 2020-10-17 PROCEDURE — 3008F BODY MASS INDEX DOCD: CPT | Performed by: NURSE PRACTITIONER

## 2020-10-17 PROCEDURE — 3080F DIAST BP >= 90 MM HG: CPT | Performed by: NURSE PRACTITIONER

## 2020-10-17 RX ORDER — AMOXICILLIN AND CLAVULANATE POTASSIUM 875; 125 MG/1; MG/1
1 TABLET, FILM COATED ORAL 2 TIMES DAILY
Qty: 20 TABLET | Refills: 0 | Status: SHIPPED | OUTPATIENT
Start: 2020-10-17 | End: 2020-10-27

## 2020-10-17 NOTE — PROGRESS NOTES
CHIEF COMPLAINT:   Patient presents with:  Sinus Problem: worsening x2 days      HPI:   Rupali Ballard is a 41year old female who presents for untreated allergies and worsening sinus congestion for  2  days. Symptoms have been worsening since onset.  Sin drinks    Drug use: No        REVIEW OF SYSTEMS:   GENERAL: feels well otherwise, no unplanned weight change,  ok appetite  SKIN: no rashes or abnormal skin lesions  HEENT: See HPI.     LUNGS: denies shortness of breath or wheezing, See HPI  CARDIOVASCULAR: options  -stay well hydrated and rest  -follow up if you develop a fever, worsening in symptoms or no improvement in 3-4 days      Sinusitis (Antibiotic Treatment)    The sinuses are air-filled spaces within the bones of the face.  They connect to the insid This is because side effects may be increased. Read labels. You can also ask the pharmacist for help. (People with high blood pressure should not use decongestants.  They can raise blood pressure.) Talk with your provider or pharmacist if you have any quest vaccines. Mykel last reviewed this educational content on 12/1/2019  © 0960-4371 [employer]. [address] [facility] [place]. All rights reserved. This information is not intended as a substitute for professional medical care.

## 2020-10-17 NOTE — PATIENT INSTRUCTIONS
-take BP medication  -take daily allergy medication.  claritin or zyrtec are good over the counter options  -stay well hydrated and rest  -follow up if you develop a fever, worsening in symptoms or no improvement in 3-4 days      Sinusitis (Antibiotic Treat also take pills that contain pseudoephedrine. Don’t use products that combine multiple medicines. This is because side effects may be increased. Read labels. You can also ask the pharmacist for help.  (People with high blood pressure should not use deconges infections. · Don’t smoke, and stay away from secondhand smoke. · Stay up to date with of your vaccines. Mykel last reviewed this educational content on 12/1/2019  © 0635-9293 [employer]. [address] [facility], [facility].  All

## 2020-10-20 DIAGNOSIS — L40.9 PSORIASIS OF SCALP: ICD-10-CM

## 2020-10-21 ENCOUNTER — OFFICE VISIT (OUTPATIENT)
Dept: FAMILY MEDICINE CLINIC | Facility: CLINIC | Age: 45
End: 2020-10-21
Payer: COMMERCIAL

## 2020-10-21 ENCOUNTER — TELEPHONE (OUTPATIENT)
Dept: FAMILY MEDICINE CLINIC | Facility: CLINIC | Age: 45
End: 2020-10-21

## 2020-10-21 VITALS
RESPIRATION RATE: 16 BRPM | DIASTOLIC BLOOD PRESSURE: 76 MMHG | TEMPERATURE: 98 F | SYSTOLIC BLOOD PRESSURE: 124 MMHG | WEIGHT: 192 LBS | HEIGHT: 65 IN | OXYGEN SATURATION: 98 % | BODY MASS INDEX: 31.99 KG/M2 | HEART RATE: 68 BPM

## 2020-10-21 DIAGNOSIS — B35.4 TINEA CORPORIS: Primary | ICD-10-CM

## 2020-10-21 PROCEDURE — 3078F DIAST BP <80 MM HG: CPT | Performed by: NURSE PRACTITIONER

## 2020-10-21 PROCEDURE — 3008F BODY MASS INDEX DOCD: CPT | Performed by: NURSE PRACTITIONER

## 2020-10-21 PROCEDURE — 3074F SYST BP LT 130 MM HG: CPT | Performed by: NURSE PRACTITIONER

## 2020-10-21 PROCEDURE — 99213 OFFICE O/P EST LOW 20 MIN: CPT | Performed by: NURSE PRACTITIONER

## 2020-10-21 RX ORDER — NYSTATIN AND TRIAMCINOLONE ACETONIDE 100000; 1 [USP'U]/G; MG/G
1 OINTMENT TOPICAL 2 TIMES DAILY
Qty: 30 G | Refills: 0 | Status: SHIPPED | OUTPATIENT
Start: 2020-10-21 | End: 2020-12-15

## 2020-10-21 NOTE — TELEPHONE ENCOUNTER
Pt is requesting a refill on a cream she got at a walk in clinic about a year go for a rash in her bikini line area. Pt states she has the exact same rash in the same area. She tried calling the pharmacy but they didn't have it on file.      nystatin-triamc

## 2020-10-21 NOTE — TELEPHONE ENCOUNTER
S/w pt. Has not been here since 2/2019, needs eval.  Agrees to see Francisco Gorman she will see if her blood pressure medication be refilled at that time.  I explained this vs is just for the rash and that if dressler manages her pressure, she needs an appt w

## 2020-10-21 NOTE — PROGRESS NOTES
Jalil Parker is a 41year old female. HPI:   Patient presents today reporting a rash to her left groin that has been present x several days. Reports rash is itchy. No drainage. Denies any other rashes. Denies any fever or chills. Denies any body aches. bilaterally, nose no congestion, throat clear no erythema without mass.    EYES: sclera clear without injection  NECK: supple,no adenopathy  LUNGS: clear to auscultation no rales, rhonchi or wheezes  CARDIO: RRR without murmur  Neurological: nerves II throu

## 2020-11-02 NOTE — TELEPHONE ENCOUNTER
Fax from [facility] for fluocinonide solution. See refill request 10/20/2020.  Duplicate

## 2020-11-03 RX ORDER — FLUOCINONIDE TOPICAL SOLUTION USP, 0.05% 0.5 MG/ML
SOLUTION TOPICAL
Qty: 60 ML | Refills: 2 | OUTPATIENT
Start: 2020-11-03

## 2020-11-12 ENCOUNTER — OFFICE VISIT (OUTPATIENT)
Dept: FAMILY MEDICINE CLINIC | Facility: CLINIC | Age: 45
End: 2020-11-12
Payer: COMMERCIAL

## 2020-11-12 VITALS
HEIGHT: 65 IN | BODY MASS INDEX: 31.82 KG/M2 | WEIGHT: 191 LBS | RESPIRATION RATE: 16 BRPM | SYSTOLIC BLOOD PRESSURE: 120 MMHG | TEMPERATURE: 98 F | DIASTOLIC BLOOD PRESSURE: 76 MMHG | HEART RATE: 72 BPM

## 2020-11-12 DIAGNOSIS — R09.82 POST-NASAL DRAINAGE: ICD-10-CM

## 2020-11-12 DIAGNOSIS — L98.7 EXCESS SKIN OF ABDOMINAL WALL: ICD-10-CM

## 2020-11-12 DIAGNOSIS — K40.90 RIGHT INGUINAL HERNIA: ICD-10-CM

## 2020-11-12 DIAGNOSIS — Z13.220 SCREENING CHOLESTEROL LEVEL: ICD-10-CM

## 2020-11-12 DIAGNOSIS — I10 BENIGN ESSENTIAL HYPERTENSION: Primary | ICD-10-CM

## 2020-11-12 DIAGNOSIS — Z86.39 HISTORY OF VITAMIN D DEFICIENCY: ICD-10-CM

## 2020-11-12 PROCEDURE — 3074F SYST BP LT 130 MM HG: CPT | Performed by: FAMILY MEDICINE

## 2020-11-12 PROCEDURE — 3008F BODY MASS INDEX DOCD: CPT | Performed by: FAMILY MEDICINE

## 2020-11-12 PROCEDURE — 3078F DIAST BP <80 MM HG: CPT | Performed by: FAMILY MEDICINE

## 2020-11-12 PROCEDURE — 99214 OFFICE O/P EST MOD 30 MIN: CPT | Performed by: FAMILY MEDICINE

## 2020-11-12 PROCEDURE — 99072 ADDL SUPL MATRL&STAF TM PHE: CPT | Performed by: FAMILY MEDICINE

## 2020-11-12 RX ORDER — LISINOPRIL 10 MG/1
10 TABLET ORAL DAILY
Qty: 90 TABLET | Refills: 1 | Status: SHIPPED | OUTPATIENT
Start: 2020-11-12 | End: 2020-12-15

## 2020-11-12 NOTE — PROGRESS NOTES
CHIEF COMPLAINT:   Claudette Lundberg a 41year old female is here for followup on HTN  HPI:   Claudette Lundberg has been doing well since the last visit here. Claudette Lundberg  is compliant with hypertensive medication. No chest pain. No dyspnea.  No palpitations measuring 1.9 cm. The hernia measures 4.4 x 2.2 x 3 cm. No left-sided inguinal hernia is seen.      No fluid collection or mass is seen within the left labial region.       =====  CONCLUSION:  Examination positive for a right inguinal hernia measuring

## 2020-11-16 ENCOUNTER — LAB ENCOUNTER (OUTPATIENT)
Dept: LAB | Age: 45
End: 2020-11-16
Attending: FAMILY MEDICINE
Payer: COMMERCIAL

## 2020-11-16 DIAGNOSIS — I10 BENIGN ESSENTIAL HYPERTENSION: ICD-10-CM

## 2020-11-16 DIAGNOSIS — Z86.39 HISTORY OF VITAMIN D DEFICIENCY: ICD-10-CM

## 2020-11-16 DIAGNOSIS — Z13.220 SCREENING CHOLESTEROL LEVEL: ICD-10-CM

## 2020-11-16 PROCEDURE — 80061 LIPID PANEL: CPT

## 2020-11-16 PROCEDURE — 82306 VITAMIN D 25 HYDROXY: CPT

## 2020-11-16 PROCEDURE — 36415 COLL VENOUS BLD VENIPUNCTURE: CPT

## 2020-11-16 PROCEDURE — 80053 COMPREHEN METABOLIC PANEL: CPT

## 2020-11-20 DIAGNOSIS — E55.9 VITAMIN D DEFICIENCY: Primary | ICD-10-CM

## 2020-11-20 RX ORDER — VITAMIN B COMPLEX
1000 TABLET ORAL DAILY
COMMUNITY
Start: 2020-11-20

## 2020-11-20 RX ORDER — ERGOCALCIFEROL 1.25 MG/1
50000 CAPSULE ORAL WEEKLY
Qty: 8 CAPSULE | Refills: 0 | Status: SHIPPED | OUTPATIENT
Start: 2020-11-20 | End: 2020-12-20

## 2020-11-25 ENCOUNTER — TELEPHONE (OUTPATIENT)
Dept: FAMILY MEDICINE CLINIC | Facility: CLINIC | Age: 45
End: 2020-11-25

## 2020-11-25 RX ORDER — LEVONORGESTREL / ETHINYL ESTRADIOL AND ETHINYL ESTRADIOL 150-30(84)
KIT ORAL
Qty: 91 TABLET | Refills: 0 | Status: SHIPPED | OUTPATIENT
Start: 2020-11-25 | End: 2021-02-18

## 2020-12-01 ENCOUNTER — MED REC SCAN ONLY (OUTPATIENT)
Dept: FAMILY MEDICINE CLINIC | Facility: CLINIC | Age: 45
End: 2020-12-01

## 2020-12-15 ENCOUNTER — OFFICE VISIT (OUTPATIENT)
Dept: OBGYN CLINIC | Facility: CLINIC | Age: 45
End: 2020-12-15
Payer: COMMERCIAL

## 2020-12-15 VITALS
DIASTOLIC BLOOD PRESSURE: 80 MMHG | WEIGHT: 192 LBS | HEART RATE: 102 BPM | SYSTOLIC BLOOD PRESSURE: 124 MMHG | BODY MASS INDEX: 31.99 KG/M2 | HEIGHT: 65 IN

## 2020-12-15 DIAGNOSIS — Z12.4 CERVICAL CANCER SCREENING: ICD-10-CM

## 2020-12-15 DIAGNOSIS — Z01.419 ENCOUNTER FOR WELL WOMAN EXAM WITH ROUTINE GYNECOLOGICAL EXAM: Primary | ICD-10-CM

## 2020-12-15 PROCEDURE — 88175 CYTOPATH C/V AUTO FLUID REDO: CPT | Performed by: OBSTETRICS & GYNECOLOGY

## 2020-12-15 PROCEDURE — 3079F DIAST BP 80-89 MM HG: CPT | Performed by: OBSTETRICS & GYNECOLOGY

## 2020-12-15 PROCEDURE — 87624 HPV HI-RISK TYP POOLED RSLT: CPT | Performed by: OBSTETRICS & GYNECOLOGY

## 2020-12-15 PROCEDURE — 3074F SYST BP LT 130 MM HG: CPT | Performed by: OBSTETRICS & GYNECOLOGY

## 2020-12-15 PROCEDURE — 99396 PREV VISIT EST AGE 40-64: CPT | Performed by: OBSTETRICS & GYNECOLOGY

## 2020-12-15 PROCEDURE — 3008F BODY MASS INDEX DOCD: CPT | Performed by: OBSTETRICS & GYNECOLOGY

## 2020-12-15 RX ORDER — LISINOPRIL 10 MG/1
10 TABLET ORAL DAILY
Qty: 90 TABLET | Refills: 3 | Status: SHIPPED | OUTPATIENT
Start: 2020-12-15

## 2020-12-15 NOTE — PROGRESS NOTES
Gretchen Owens is a 41year old female  No LMP recorded. (Menstrual status: Continuous Pill). Patient presents with:  Wellness Visit  . Patient has no complaints    OBSTETRICS HISTORY:  OB History    Para Term  AB Living   2 2 2     2 Stress: Not on file    Relationships      Social connections        Talks on phone: Not on file        Gets together: Not on file        Attends Jew service: Not on file        Active member of club or organization: Not on file        Attends meeting Allergies      Review of Systems:  Constitutional:  Denies fatigue, night sweats, hot flashes  Eyes:  denies blurred or double vision  Cardiovascular:  denies chest pain or palpitations  Respiratory:  denies shortness of breath  Gastrointestinal:  denies h HPV HIGH RISK , THIN PREP COLLECTION; Future  -     THINPREP PAP SMEAR B; Future    Other orders  -     lisinopril 10 MG Oral Tab; Take 1 tablet (10 mg total) by mouth daily.

## 2020-12-19 ENCOUNTER — IMMUNIZATION (OUTPATIENT)
Dept: LAB | Facility: HOSPITAL | Age: 45
End: 2020-12-19
Attending: PREVENTIVE MEDICINE
Payer: COMMERCIAL

## 2020-12-19 DIAGNOSIS — Z23 NEED FOR VACCINATION: ICD-10-CM

## 2020-12-19 PROCEDURE — 0001A PFIZER-BIONTECH COVID-19 VACCINE: CPT

## 2021-01-09 ENCOUNTER — IMMUNIZATION (OUTPATIENT)
Dept: LAB | Facility: HOSPITAL | Age: 46
End: 2021-01-09
Attending: PREVENTIVE MEDICINE

## 2021-01-09 DIAGNOSIS — Z23 NEED FOR VACCINATION: ICD-10-CM

## 2021-01-09 PROCEDURE — 0002A SARSCOV2 VAC 30MCG/0.3ML IM: CPT

## 2021-02-01 ENCOUNTER — OFFICE VISIT (OUTPATIENT)
Dept: SURGERY | Facility: CLINIC | Age: 46
End: 2021-02-01
Payer: COMMERCIAL

## 2021-02-01 VITALS
DIASTOLIC BLOOD PRESSURE: 85 MMHG | SYSTOLIC BLOOD PRESSURE: 125 MMHG | BODY MASS INDEX: 31.99 KG/M2 | WEIGHT: 192 LBS | HEART RATE: 80 BPM | TEMPERATURE: 98 F | HEIGHT: 65 IN

## 2021-02-01 DIAGNOSIS — K40.90 RIGHT INGUINAL HERNIA: Primary | ICD-10-CM

## 2021-02-01 PROCEDURE — 99243 OFF/OP CNSLTJ NEW/EST LOW 30: CPT | Performed by: SURGERY

## 2021-02-01 PROCEDURE — 3074F SYST BP LT 130 MM HG: CPT | Performed by: SURGERY

## 2021-02-01 PROCEDURE — 3079F DIAST BP 80-89 MM HG: CPT | Performed by: SURGERY

## 2021-02-01 PROCEDURE — 3008F BODY MASS INDEX DOCD: CPT | Performed by: SURGERY

## 2021-02-01 NOTE — H&P
New Patient Visit Note       Active Problems      1.  Right inguinal hernia        Chief Complaint   Patient presents with:  Hernia: New pt referred by Dr. Marcos Serrano for right groin hernia consult - Pt. had a scan completed in 2019 which showed a 4.4cm herni has had recurrent issues with yeast infections of the groin. She does have a nystatin cream which she utilizes as needed. I acted as a scribe in this encounter.      The physician obtained a history, independently performed a physical exam, and riose LEVONORGEST-ETH ESTRAD 91-DAY 0.15-0.03 &0.01 MG Oral Tab, TAKE 1 TABLET BY MOUTH EVERY DAY, Disp: 91 tablet, Rfl: 0  •  Cholecalciferol (VITAMIN D) 25 MCG (1000 UT) Oral Tab, Take 1,000 Units by mouth daily. , Disp: , Rfl:   •  Fluocinonide 0.05 % External no rhonchi. She has no rales. Abdominal: Soft. Normal appearance. She exhibits no shifting dullness, no distension, no pulsatile liver, no fluid wave, no abdominal bruit, no ascites, no pulsatile midline mass and no mass.  There is no splenomegaly or hepa panniculectomy versus abdominoplasty, an open inguinal hernia repair could be pursued. If the patient is unable to undergo plastic surgery, I would then recommend a laparoscopic approach.   Ultimately, the surgical plan will be based on the plastic surgery

## 2021-02-12 ENCOUNTER — MED REC SCAN ONLY (OUTPATIENT)
Dept: FAMILY MEDICINE CLINIC | Facility: CLINIC | Age: 46
End: 2021-02-12

## 2021-02-18 RX ORDER — LEVONORGESTREL / ETHINYL ESTRADIOL AND ETHINYL ESTRADIOL 150-30(84)
KIT ORAL
Qty: 91 TABLET | Refills: 0 | Status: SHIPPED | OUTPATIENT
Start: 2021-02-18 | End: 2021-05-13

## 2021-03-08 ENCOUNTER — LAB ENCOUNTER (OUTPATIENT)
Dept: LAB | Age: 46
End: 2021-03-08
Attending: OTOLARYNGOLOGY
Payer: COMMERCIAL

## 2021-03-08 DIAGNOSIS — I10 HYPERTENSION, UNSPECIFIED TYPE: ICD-10-CM

## 2021-03-08 LAB
ALBUMIN SERPL-MCNC: 3.5 G/DL (ref 3.4–5)
ALBUMIN/GLOB SERPL: 0.9 {RATIO} (ref 1–2)
ALP LIVER SERPL-CCNC: 75 U/L
ALT SERPL-CCNC: 34 U/L
ANION GAP SERPL CALC-SCNC: 5 MMOL/L (ref 0–18)
AST SERPL-CCNC: 17 U/L (ref 15–37)
BILIRUB SERPL-MCNC: 0.6 MG/DL (ref 0.1–2)
BUN BLD-MCNC: 10 MG/DL (ref 7–18)
BUN/CREAT SERPL: 15.2 (ref 10–20)
CALCIUM BLD-MCNC: 9.2 MG/DL (ref 8.5–10.1)
CHLORIDE SERPL-SCNC: 109 MMOL/L (ref 98–112)
CO2 SERPL-SCNC: 25 MMOL/L (ref 21–32)
CREAT BLD-MCNC: 0.66 MG/DL
GLOBULIN PLAS-MCNC: 3.7 G/DL (ref 2.8–4.4)
GLUCOSE BLD-MCNC: 81 MG/DL (ref 70–99)
M PROTEIN MFR SERPL ELPH: 7.2 G/DL (ref 6.4–8.2)
OSMOLALITY SERPL CALC.SUM OF ELEC: 286 MOSM/KG (ref 275–295)
PATIENT FASTING Y/N/NP: YES
POTASSIUM SERPL-SCNC: 4 MMOL/L (ref 3.5–5.1)
SODIUM SERPL-SCNC: 139 MMOL/L (ref 136–145)

## 2021-03-08 PROCEDURE — 80053 COMPREHEN METABOLIC PANEL: CPT

## 2021-03-08 PROCEDURE — 36415 COLL VENOUS BLD VENIPUNCTURE: CPT

## 2021-03-11 ENCOUNTER — EKG ENCOUNTER (OUTPATIENT)
Dept: LAB | Facility: HOSPITAL | Age: 46
End: 2021-03-11
Attending: FAMILY MEDICINE
Payer: COMMERCIAL

## 2021-03-11 DIAGNOSIS — I10 HYPERTENSION, UNSPECIFIED TYPE: ICD-10-CM

## 2021-03-11 PROCEDURE — 93010 ELECTROCARDIOGRAM REPORT: CPT | Performed by: INTERNAL MEDICINE

## 2021-03-11 PROCEDURE — 93005 ELECTROCARDIOGRAM TRACING: CPT

## 2021-03-12 LAB
ATRIAL RATE: 83 BPM
P AXIS: 52 DEGREES
P-R INTERVAL: 136 MS
Q-T INTERVAL: 364 MS
QRS DURATION: 96 MS
QTC CALCULATION (BEZET): 427 MS
R AXIS: 74 DEGREES
T AXIS: 42 DEGREES
VENTRICULAR RATE: 83 BPM

## 2021-03-29 ENCOUNTER — MED REC SCAN ONLY (OUTPATIENT)
Dept: FAMILY MEDICINE CLINIC | Facility: CLINIC | Age: 46
End: 2021-03-29

## 2021-04-15 ENCOUNTER — OFFICE VISIT (OUTPATIENT)
Dept: OTOLARYNGOLOGY | Facility: CLINIC | Age: 46
End: 2021-04-15
Payer: COMMERCIAL

## 2021-04-15 VITALS
DIASTOLIC BLOOD PRESSURE: 79 MMHG | TEMPERATURE: 98 F | SYSTOLIC BLOOD PRESSURE: 129 MMHG | WEIGHT: 190 LBS | HEIGHT: 65 IN | BODY MASS INDEX: 31.65 KG/M2

## 2021-04-15 DIAGNOSIS — J32.4 CHRONIC PANSINUSITIS: Primary | ICD-10-CM

## 2021-04-15 DIAGNOSIS — J32.0 OROANTRAL FISTULA: ICD-10-CM

## 2021-04-15 PROCEDURE — 99204 OFFICE O/P NEW MOD 45 MIN: CPT | Performed by: OTOLARYNGOLOGY

## 2021-04-15 PROCEDURE — 3008F BODY MASS INDEX DOCD: CPT | Performed by: OTOLARYNGOLOGY

## 2021-04-15 PROCEDURE — 3078F DIAST BP <80 MM HG: CPT | Performed by: OTOLARYNGOLOGY

## 2021-04-15 PROCEDURE — 3074F SYST BP LT 130 MM HG: CPT | Performed by: OTOLARYNGOLOGY

## 2021-04-15 RX ORDER — FLUCONAZOLE 150 MG/1
150 TABLET ORAL
COMMUNITY
Start: 2021-03-19 | End: 2021-04-28 | Stop reason: ALTCHOICE

## 2021-04-15 RX ORDER — CHLORHEXIDINE GLUCONATE 0.12 MG/ML
RINSE ORAL
COMMUNITY
Start: 2021-03-19 | End: 2021-04-28 | Stop reason: ALTCHOICE

## 2021-04-15 RX ORDER — ACETAMINOPHEN AND CODEINE PHOSPHATE 300; 30 MG/1; MG/1
1 TABLET ORAL EVERY 6 HOURS PRN
COMMUNITY
Start: 2021-03-19 | End: 2021-04-28 | Stop reason: ALTCHOICE

## 2021-04-15 NOTE — PROGRESS NOTES
Alem Rivas is a 41year old female.   Patient presents with:  Sinus Problem: sinus perforation after pt had a tooth pulled Aug 2019, pt was advised that she needs sinus surgery by a previous ENT, pt would like a second opinion in regards to sinus surger Medical History:   Diagnosis Date   • Essential hypertension    • Hemorrhoid    • Human papilloma virus infection     20 year ago per pt   • Hypertension    • Pap smear for cervical cancer screening 05/16, 12/14, 10/12, 10/11, 09/10, 09/09    negative hpv Left: Normal. Canal - Right: Normal, Left: Normal. TM - Right: Normal, Left: Normal. Hearing is odin   Skin Normal Inspection - Normal.        Lymph Detail Normal Submental. Submandibular. Anterior cervical. Posterior cervical. Supraclavicular. extent of disease and also for surgical planning she agrees to this plan. 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

## 2021-04-27 ENCOUNTER — OFFICE VISIT (OUTPATIENT)
Dept: SURGERY | Facility: CLINIC | Age: 46
End: 2021-04-27
Payer: COMMERCIAL

## 2021-04-27 ENCOUNTER — HOSPITAL ENCOUNTER (OUTPATIENT)
Dept: CT IMAGING | Facility: HOSPITAL | Age: 46
Discharge: HOME | End: 2021-04-27
Attending: OTOLARYNGOLOGY
Payer: COMMERCIAL

## 2021-04-27 VITALS
HEIGHT: 65 IN | WEIGHT: 189 LBS | RESPIRATION RATE: 16 BRPM | SYSTOLIC BLOOD PRESSURE: 119 MMHG | OXYGEN SATURATION: 97 % | DIASTOLIC BLOOD PRESSURE: 80 MMHG | HEART RATE: 101 BPM | BODY MASS INDEX: 31.49 KG/M2

## 2021-04-27 DIAGNOSIS — J32.4 CHRONIC PANSINUSITIS: ICD-10-CM

## 2021-04-27 DIAGNOSIS — E65 ABDOMINAL PANNUS: Primary | ICD-10-CM

## 2021-04-27 DIAGNOSIS — K40.90 RIGHT INGUINAL HERNIA: ICD-10-CM

## 2021-04-27 PROCEDURE — 3074F SYST BP LT 130 MM HG: CPT | Performed by: SURGERY

## 2021-04-27 PROCEDURE — 3008F BODY MASS INDEX DOCD: CPT | Performed by: SURGERY

## 2021-04-27 PROCEDURE — 70486 CT MAXILLOFACIAL W/O DYE: CPT | Performed by: OTOLARYNGOLOGY

## 2021-04-27 PROCEDURE — 99243 OFF/OP CNSLTJ NEW/EST LOW 30: CPT | Performed by: SURGERY

## 2021-04-27 PROCEDURE — 3079F DIAST BP 80-89 MM HG: CPT | Performed by: SURGERY

## 2021-04-27 NOTE — CONSULTS
New Patient Consultation    This is the first visit for this 41year old referred for consideration for panniculectomy. History of Present Illness:    The patient is a 41year old referred by Dr. Ninfa Garcia for consideration for panniculectomy at the time of LEVONORGEST-ETH ESTRAD 91-DAY 0.15-0.03 &0.01 MG Oral Tab, TAKE 1 TABLET BY MOUTH EVERY DAY, Disp: 91 tablet, Rfl: 0  lisinopril 10 MG Oral Tab, Take 1 tablet (10 mg total) by mouth daily. , Disp: 90 tablet, Rfl: 3  Cholecalciferol (VITAMIN D) 25 MCG (100 swallowing, +reflux symptoms, nausea, vomiting, dark/ bloody stools, diarrhea, constipation,  +change in bowel habits, or abdominal pain.      Genitourinary:  The patient denies frequent urination, needing to get up at night to urinate, urinary hesitancy or is noted in the left lower quadrant consistent with the previous inguinal herniorrhaphy. Descent of the mons pubis is noted. Moderate upper abdominal lipodystrophy with skin excess is noted. Lymph Nodes:   There is no cervical, supraclavicular, or axil

## 2021-04-28 ENCOUNTER — OFFICE VISIT (OUTPATIENT)
Dept: OTOLARYNGOLOGY | Facility: CLINIC | Age: 46
End: 2021-04-28
Payer: COMMERCIAL

## 2021-04-28 ENCOUNTER — TELEPHONE (OUTPATIENT)
Dept: OTOLARYNGOLOGY | Facility: CLINIC | Age: 46
End: 2021-04-28

## 2021-04-28 VITALS
WEIGHT: 190 LBS | SYSTOLIC BLOOD PRESSURE: 120 MMHG | DIASTOLIC BLOOD PRESSURE: 68 MMHG | HEIGHT: 65 IN | BODY MASS INDEX: 31.65 KG/M2

## 2021-04-28 DIAGNOSIS — J32.4 CHRONIC PANSINUSITIS: Primary | ICD-10-CM

## 2021-04-28 DIAGNOSIS — J32.0 CHRONIC MAXILLARY SINUSITIS: ICD-10-CM

## 2021-04-28 PROCEDURE — 3078F DIAST BP <80 MM HG: CPT | Performed by: OTOLARYNGOLOGY

## 2021-04-28 PROCEDURE — 99214 OFFICE O/P EST MOD 30 MIN: CPT | Performed by: OTOLARYNGOLOGY

## 2021-04-28 PROCEDURE — 3074F SYST BP LT 130 MM HG: CPT | Performed by: OTOLARYNGOLOGY

## 2021-04-28 PROCEDURE — 3008F BODY MASS INDEX DOCD: CPT | Performed by: OTOLARYNGOLOGY

## 2021-04-28 NOTE — PROGRESS NOTES
Aniket Calixto is a 41year old female. No chief complaint on file. HISTORY OF PRESENT ILLNESS  4/15/2021  Patient prevents for evaluation of several years of left-sided sinusitis.   It all started after she had upper molar extracted she then develope Father    • Diabetes Maternal Grandfather      Past Medical History:   Diagnosis Date   • Essential hypertension    • Hemorrhoid    • Human papilloma virus infection     20 year ago per pt   • Hypertension    • Pap smear for cervical cancer screening 05/16 Hearing is odin   Skin Normal Inspection - Normal.        Lymph Detail Normal Submental. Submandibular. Anterior cervical. Posterior cervical. Supraclavicular.               Nose/Mouth/Throat abNormal Nares - Right: Normal Left: Normal. Septum deviated wit MD    This note was partially prepared using [employer] [facility] [employer] voice recognition dictation software. As a result, errors may occur. When identified, these errors have been corrected.  While every attempt is made to correct errors during dictation, discrepancie

## 2021-05-11 ENCOUNTER — TELEPHONE (OUTPATIENT)
Dept: OBGYN CLINIC | Facility: CLINIC | Age: 46
End: 2021-05-11

## 2021-05-11 ENCOUNTER — LAB ENCOUNTER (OUTPATIENT)
Dept: LAB | Facility: HOSPITAL | Age: 46
End: 2021-05-11
Attending: OTOLARYNGOLOGY
Payer: COMMERCIAL

## 2021-05-11 DIAGNOSIS — Z12.31 ENCOUNTER FOR SCREENING MAMMOGRAM FOR MALIGNANT NEOPLASM OF BREAST: Primary | ICD-10-CM

## 2021-05-11 DIAGNOSIS — Z01.818 PREOP TESTING: ICD-10-CM

## 2021-05-11 NOTE — TELEPHONE ENCOUNTER
Per Rakesh Rios from central scheduling pt has an appt for her annual mammogram but there is no order. Please advise and send it in.  Thanks

## 2021-05-13 ENCOUNTER — TELEPHONE (OUTPATIENT)
Dept: OTOLARYNGOLOGY | Facility: CLINIC | Age: 46
End: 2021-05-13

## 2021-05-13 RX ORDER — LEVONORGESTREL / ETHINYL ESTRADIOL AND ETHINYL ESTRADIOL 150-30(84)
KIT ORAL
Qty: 91 TABLET | Refills: 0 | Status: SHIPPED | OUTPATIENT
Start: 2021-05-13 | End: 2021-08-03

## 2021-05-13 NOTE — H&P
Tawana Marerro is a 41year old female. No chief complaint on file. HISTORY OF PRESENT ILLNESS  4/15/2021  Patient prevents for evaluation of several years of left-sided sinusitis.   It all started after she had upper molar extracted she then develope Father    • Diabetes Maternal Grandfather      Past Medical History:   Diagnosis Date   • Essential hypertension    • Hemorrhoid    • Human papilloma virus infection     20 year ago per pt   • Hypertension    • Pap smear for cervical cancer screening 05/16 Hearing is odin   Skin Normal Inspection - Normal.        Lymph Detail Normal Submental. Submandibular. Anterior cervical. Posterior cervical. Supraclavicular.               Nose/Mouth/Throat abNormal Nares - Right: Normal Left: Normal. Septum deviated wit

## 2021-05-14 ENCOUNTER — ANESTHESIA EVENT (OUTPATIENT)
Dept: SURGERY | Facility: HOSPITAL | Age: 46
End: 2021-05-14
Payer: COMMERCIAL

## 2021-05-14 ENCOUNTER — HOSPITAL ENCOUNTER (OUTPATIENT)
Facility: HOSPITAL | Age: 46
Setting detail: HOSPITAL OUTPATIENT SURGERY
Discharge: HOME | End: 2021-05-14
Attending: OTOLARYNGOLOGY | Admitting: OTOLARYNGOLOGY
Payer: COMMERCIAL

## 2021-05-14 ENCOUNTER — ANESTHESIA (OUTPATIENT)
Dept: SURGERY | Facility: HOSPITAL | Age: 46
End: 2021-05-14
Payer: COMMERCIAL

## 2021-05-14 VITALS
HEIGHT: 65 IN | OXYGEN SATURATION: 97 % | HEART RATE: 82 BPM | SYSTOLIC BLOOD PRESSURE: 134 MMHG | WEIGHT: 185 LBS | RESPIRATION RATE: 16 BRPM | TEMPERATURE: 98 F | BODY MASS INDEX: 30.82 KG/M2 | DIASTOLIC BLOOD PRESSURE: 77 MMHG

## 2021-05-14 DIAGNOSIS — Z01.818 PREOP TESTING: Primary | ICD-10-CM

## 2021-05-14 DIAGNOSIS — J32.0 CHRONIC MAXILLARY SINUSITIS: ICD-10-CM

## 2021-05-14 DIAGNOSIS — J32.4 CHRONIC PANSINUSITIS: ICD-10-CM

## 2021-05-14 PROCEDURE — 31267 ENDOSCOPY MAXILLARY SINUS: CPT | Performed by: OTOLARYNGOLOGY

## 2021-05-14 PROCEDURE — 09TV8ZZ RESECTION OF LEFT ETHMOID SINUS, VIA NATURAL OR ARTIFICIAL OPENING ENDOSCOPIC: ICD-10-PCS | Performed by: OTOLARYNGOLOGY

## 2021-05-14 PROCEDURE — 099R8ZZ DRAINAGE OF LEFT MAXILLARY SINUS, VIA NATURAL OR ARTIFICIAL OPENING ENDOSCOPIC: ICD-10-PCS | Performed by: OTOLARYNGOLOGY

## 2021-05-14 PROCEDURE — 31255 NSL/SINS NDSC W/TOT ETHMDCT: CPT | Performed by: OTOLARYNGOLOGY

## 2021-05-14 PROCEDURE — 61782 SCAN PROC CRANIAL EXTRA: CPT | Performed by: OTOLARYNGOLOGY

## 2021-05-14 PROCEDURE — 8E09XBZ COMPUTER ASSISTED PROCEDURE OF HEAD AND NECK REGION: ICD-10-PCS | Performed by: OTOLARYNGOLOGY

## 2021-05-14 PROCEDURE — 30930 THER FX NASAL INF TURBINATE: CPT | Performed by: OTOLARYNGOLOGY

## 2021-05-14 DEVICE — PROPEL SINUS IMPLANT
Type: IMPLANTABLE DEVICE | Site: NOSE | Status: FUNCTIONAL
Brand: PROPEL

## 2021-05-14 RX ORDER — DEXAMETHASONE 6 MG/1
TABLET ORAL
Qty: 4 TABLET | Refills: 0 | Status: SHIPPED | OUTPATIENT
Start: 2021-05-15 | End: 2021-05-20 | Stop reason: ALTCHOICE

## 2021-05-14 RX ORDER — MORPHINE SULFATE 10 MG/ML
6 INJECTION, SOLUTION INTRAMUSCULAR; INTRAVENOUS EVERY 10 MIN PRN
Status: DISCONTINUED | OUTPATIENT
Start: 2021-05-14 | End: 2021-05-14

## 2021-05-14 RX ORDER — MORPHINE SULFATE 4 MG/ML
4 INJECTION, SOLUTION INTRAMUSCULAR; INTRAVENOUS EVERY 10 MIN PRN
Status: DISCONTINUED | OUTPATIENT
Start: 2021-05-14 | End: 2021-05-14

## 2021-05-14 RX ORDER — HALOPERIDOL 5 MG/ML
0.25 INJECTION INTRAMUSCULAR ONCE AS NEEDED
Status: DISCONTINUED | OUTPATIENT
Start: 2021-05-14 | End: 2021-05-14

## 2021-05-14 RX ORDER — LIDOCAINE HYDROCHLORIDE AND EPINEPHRINE 10; 10 MG/ML; UG/ML
INJECTION, SOLUTION INFILTRATION; PERINEURAL AS NEEDED
Status: DISCONTINUED | OUTPATIENT
Start: 2021-05-14 | End: 2021-05-14 | Stop reason: HOSPADM

## 2021-05-14 RX ORDER — DIAPER,BRIEF,INFANT-TODD,DISP
EACH MISCELLANEOUS AS NEEDED
Status: DISCONTINUED | OUTPATIENT
Start: 2021-05-14 | End: 2021-05-14 | Stop reason: HOSPADM

## 2021-05-14 RX ORDER — SODIUM CHLORIDE, SODIUM LACTATE, POTASSIUM CHLORIDE, CALCIUM CHLORIDE 600; 310; 30; 20 MG/100ML; MG/100ML; MG/100ML; MG/100ML
INJECTION, SOLUTION INTRAVENOUS CONTINUOUS
Status: DISCONTINUED | OUTPATIENT
Start: 2021-05-14 | End: 2021-05-14

## 2021-05-14 RX ORDER — ONDANSETRON 2 MG/ML
4 INJECTION INTRAMUSCULAR; INTRAVENOUS ONCE AS NEEDED
Status: DISCONTINUED | OUTPATIENT
Start: 2021-05-14 | End: 2021-05-14

## 2021-05-14 RX ORDER — MORPHINE SULFATE 4 MG/ML
2 INJECTION, SOLUTION INTRAMUSCULAR; INTRAVENOUS EVERY 10 MIN PRN
Status: DISCONTINUED | OUTPATIENT
Start: 2021-05-14 | End: 2021-05-14

## 2021-05-14 RX ORDER — FAMOTIDINE 20 MG/1
20 TABLET ORAL ONCE
Status: COMPLETED | OUTPATIENT
Start: 2021-05-14 | End: 2021-05-14

## 2021-05-14 RX ORDER — ONDANSETRON 2 MG/ML
INJECTION INTRAMUSCULAR; INTRAVENOUS AS NEEDED
Status: DISCONTINUED | OUTPATIENT
Start: 2021-05-14 | End: 2021-05-14 | Stop reason: SURG

## 2021-05-14 RX ORDER — METOCLOPRAMIDE 10 MG/1
10 TABLET ORAL ONCE
Status: COMPLETED | OUTPATIENT
Start: 2021-05-14 | End: 2021-05-14

## 2021-05-14 RX ORDER — HYDROCODONE BITARTRATE AND ACETAMINOPHEN 5; 325 MG/1; MG/1
1-2 TABLET ORAL EVERY 4 HOURS PRN
Qty: 20 TABLET | Refills: 0 | Status: SHIPPED | OUTPATIENT
Start: 2021-05-14 | End: 2021-05-20

## 2021-05-14 RX ORDER — HYDROCODONE BITARTRATE AND ACETAMINOPHEN 5; 325 MG/1; MG/1
2 TABLET ORAL AS NEEDED
Status: DISCONTINUED | OUTPATIENT
Start: 2021-05-14 | End: 2021-05-14

## 2021-05-14 RX ORDER — HYDROMORPHONE HYDROCHLORIDE 1 MG/ML
0.4 INJECTION, SOLUTION INTRAMUSCULAR; INTRAVENOUS; SUBCUTANEOUS EVERY 5 MIN PRN
Status: DISCONTINUED | OUTPATIENT
Start: 2021-05-14 | End: 2021-05-14

## 2021-05-14 RX ORDER — HYDROMORPHONE HYDROCHLORIDE 1 MG/ML
0.6 INJECTION, SOLUTION INTRAMUSCULAR; INTRAVENOUS; SUBCUTANEOUS EVERY 5 MIN PRN
Status: DISCONTINUED | OUTPATIENT
Start: 2021-05-14 | End: 2021-05-14

## 2021-05-14 RX ORDER — DEXAMETHASONE SODIUM PHOSPHATE 4 MG/ML
VIAL (ML) INJECTION AS NEEDED
Status: DISCONTINUED | OUTPATIENT
Start: 2021-05-14 | End: 2021-05-14 | Stop reason: SURG

## 2021-05-14 RX ORDER — NALOXONE HYDROCHLORIDE 0.4 MG/ML
80 INJECTION, SOLUTION INTRAMUSCULAR; INTRAVENOUS; SUBCUTANEOUS AS NEEDED
Status: DISCONTINUED | OUTPATIENT
Start: 2021-05-14 | End: 2021-05-14

## 2021-05-14 RX ORDER — PROCHLORPERAZINE EDISYLATE 5 MG/ML
5 INJECTION INTRAMUSCULAR; INTRAVENOUS ONCE AS NEEDED
Status: DISCONTINUED | OUTPATIENT
Start: 2021-05-14 | End: 2021-05-14

## 2021-05-14 RX ORDER — CEFAZOLIN SODIUM/WATER 2 G/20 ML
SYRINGE (ML) INTRAVENOUS AS NEEDED
Status: DISCONTINUED | OUTPATIENT
Start: 2021-05-14 | End: 2021-05-14 | Stop reason: SURG

## 2021-05-14 RX ORDER — HYDROMORPHONE HYDROCHLORIDE 1 MG/ML
0.2 INJECTION, SOLUTION INTRAMUSCULAR; INTRAVENOUS; SUBCUTANEOUS EVERY 5 MIN PRN
Status: DISCONTINUED | OUTPATIENT
Start: 2021-05-14 | End: 2021-05-14

## 2021-05-14 RX ORDER — MIDAZOLAM HYDROCHLORIDE 1 MG/ML
INJECTION INTRAMUSCULAR; INTRAVENOUS AS NEEDED
Status: DISCONTINUED | OUTPATIENT
Start: 2021-05-14 | End: 2021-05-14 | Stop reason: SURG

## 2021-05-14 RX ORDER — HYDROCODONE BITARTRATE AND ACETAMINOPHEN 5; 325 MG/1; MG/1
1 TABLET ORAL AS NEEDED
Status: DISCONTINUED | OUTPATIENT
Start: 2021-05-14 | End: 2021-05-14

## 2021-05-14 RX ORDER — ACETAMINOPHEN 500 MG
1000 TABLET ORAL ONCE
Status: COMPLETED | OUTPATIENT
Start: 2021-05-14 | End: 2021-05-14

## 2021-05-14 RX ORDER — AMOXICILLIN 500 MG/1
500 CAPSULE ORAL 3 TIMES DAILY
Qty: 21 CAPSULE | Refills: 0 | Status: SHIPPED | OUTPATIENT
Start: 2021-05-14 | End: 2021-05-20 | Stop reason: ALTCHOICE

## 2021-05-14 RX ADMIN — MIDAZOLAM HYDROCHLORIDE 2 MG: 1 INJECTION INTRAMUSCULAR; INTRAVENOUS at 12:18:00

## 2021-05-14 RX ADMIN — DEXAMETHASONE SODIUM PHOSPHATE 8 MG: 4 MG/ML VIAL (ML) INJECTION at 12:27:00

## 2021-05-14 RX ADMIN — ONDANSETRON 4 MG: 2 INJECTION INTRAMUSCULAR; INTRAVENOUS at 12:27:00

## 2021-05-14 RX ADMIN — CEFAZOLIN SODIUM/WATER 2 G: 2 G/20 ML SYRINGE (ML) INTRAVENOUS at 12:26:00

## 2021-05-14 RX ADMIN — SODIUM CHLORIDE, SODIUM LACTATE, POTASSIUM CHLORIDE, CALCIUM CHLORIDE: 600; 310; 30; 20 INJECTION, SOLUTION INTRAVENOUS at 13:05:00

## 2021-05-14 RX ADMIN — SODIUM CHLORIDE, SODIUM LACTATE, POTASSIUM CHLORIDE, CALCIUM CHLORIDE: 600; 310; 30; 20 INJECTION, SOLUTION INTRAVENOUS at 12:07:00

## 2021-05-14 NOTE — INTERVAL H&P NOTE
Pre-op Diagnosis: Chronic pansinusitis [J32.4]  Chronic maxillary sinusitis [J32.0]    The above referenced H&P was reviewed by Levi Gordon MD on 5/14/2021, the patient was examined and no significant changes have occurred in the patient's condition sinc

## 2021-05-14 NOTE — ANESTHESIA PROCEDURE NOTES
Airway  Date/Time: 5/14/2021 12:20 PM  Urgency: Elective    Airway not difficult    General Information and Staff    Patient location during procedure: OR  Anesthesiologist: Emily Mcdonald MD  Performed: anesthesiologist     Indications and Patie

## 2021-05-14 NOTE — ANESTHESIA PREPROCEDURE EVALUATION
Anesthesia PreOp Note    HPI:     Gretchen Owens is a 41year old female who presents for preoperative consultation requested by: Mandeep Pang MD    Date of Surgery: 5/14/2021    Procedure(s):  Endoscopic Maxillary Anstrostomy with Tissue removal, Endosc SURGERY  10/1/2003    Left   • ORAL SURGERY     • REMOVAL OF OVARIAN CYST(S)  2007       LEVONORGEST-ETH ESTRAD 91-DAY 0.15-0.03 &0.01 MG Oral Tab, TAKE 1 TABLET BY MOUTH EVERY DAY, Disp: 91 tablet, Rfl: 0, 5/14/2021 at 0600  lisinopril 10 MG Oral Tab, Yair Becerra Asked        Exercise: Yes          once in a while        Bike Helmet: Not Asked        Seat Belt: Not Asked        Self-Exams: Not Asked    Social History Narrative      Not on file    Social Determinants of Health  Financial Resource Strain:       Diffi Mallampati: I  TM distance: <3 FB  Neck ROM: full  Dental      Pulmonary - negative ROS and normal exam   (-) COPD, asthma, shortness of breath  Cardiovascular - normal exam  (+) hypertension,     Neuro/Psych      GI/Hepatic/Renal - negative ROS   (-) GE

## 2021-05-14 NOTE — OPERATIVE REPORT
Simran Orellana  DATE OF SURGERY:    5/14/2021  PREOPERATIVE DIAGNOSIS:  Left Odontogenic sinusitis  POSTOPERATIVE DIAGNOSIS: Same.   OPERATIVE PROCEDURE:   Medtronic assisted navigational sinus surgery, left transnasal endoscopic: total ethmoidectomies, , occluded with inflammed tissue and copious amounts of pus. The ethmoid air cells were removed with the microdebrider. Care was taken to stay lateral to the middle turbinate.   As the diseased ethmoid air cells were removed as the location of the lamina joycelyn

## 2021-05-14 NOTE — ANESTHESIA POSTPROCEDURE EVALUATION
Patient: Gretchen Owens    Procedure Summary     Date: 05/14/21 Room / Location: [address] [place] [address] MAIN OR 03 / [address] [place] [address] MAIN OR    Anesthesia Start: 8357 Anesthesia Stop: 9382    Procedure: Endoscopic Maxillary Anstrostomy with Tissue removal, Endoscopic Total Ethmoidectomy

## 2021-05-15 ENCOUNTER — TELEPHONE (OUTPATIENT)
Dept: OTOLARYNGOLOGY | Facility: CLINIC | Age: 46
End: 2021-05-15

## 2021-05-15 NOTE — TELEPHONE ENCOUNTER
Pt is post op day 1 endo maxillary with tissue removal, endo total ethmoidectomy. Per  Pt pt is doing well no bleeding, advised pt no bending or heavy lifting for the next week and pt is not to blow nose until seen by JK.  Advised pt she can start using OTC

## 2021-05-20 ENCOUNTER — LAB ENCOUNTER (OUTPATIENT)
Dept: LAB | Facility: HOSPITAL | Age: 46
End: 2021-05-20
Attending: OTOLARYNGOLOGY
Payer: COMMERCIAL

## 2021-05-20 ENCOUNTER — OFFICE VISIT (OUTPATIENT)
Dept: OTOLARYNGOLOGY | Facility: CLINIC | Age: 46
End: 2021-05-20
Payer: COMMERCIAL

## 2021-05-20 ENCOUNTER — OFFICE VISIT (OUTPATIENT)
Dept: FAMILY MEDICINE CLINIC | Facility: CLINIC | Age: 46
End: 2021-05-20
Payer: COMMERCIAL

## 2021-05-20 ENCOUNTER — TELEPHONE (OUTPATIENT)
Dept: FAMILY MEDICINE CLINIC | Facility: CLINIC | Age: 46
End: 2021-05-20

## 2021-05-20 VITALS
WEIGHT: 186 LBS | BODY MASS INDEX: 31 KG/M2 | SYSTOLIC BLOOD PRESSURE: 102 MMHG | HEART RATE: 87 BPM | RESPIRATION RATE: 18 BRPM | DIASTOLIC BLOOD PRESSURE: 70 MMHG | OXYGEN SATURATION: 98 %

## 2021-05-20 VITALS
SYSTOLIC BLOOD PRESSURE: 114 MMHG | TEMPERATURE: 98 F | WEIGHT: 186 LBS | BODY MASS INDEX: 30.99 KG/M2 | DIASTOLIC BLOOD PRESSURE: 74 MMHG | HEIGHT: 65 IN

## 2021-05-20 DIAGNOSIS — R35.0 URINE FREQUENCY: Primary | ICD-10-CM

## 2021-05-20 DIAGNOSIS — Z98.890 H/O SINUS SURGERY: ICD-10-CM

## 2021-05-20 DIAGNOSIS — R35.0 URINARY FREQUENCY: Primary | ICD-10-CM

## 2021-05-20 DIAGNOSIS — J32.4 CHRONIC PANSINUSITIS: ICD-10-CM

## 2021-05-20 DIAGNOSIS — R19.5 LOOSE STOOLS: ICD-10-CM

## 2021-05-20 DIAGNOSIS — R30.9 URINARY PAIN: ICD-10-CM

## 2021-05-20 PROCEDURE — 3078F DIAST BP <80 MM HG: CPT | Performed by: OTOLARYNGOLOGY

## 2021-05-20 PROCEDURE — 99213 OFFICE O/P EST LOW 20 MIN: CPT | Performed by: OTOLARYNGOLOGY

## 2021-05-20 PROCEDURE — 81001 URINALYSIS AUTO W/SCOPE: CPT

## 2021-05-20 PROCEDURE — 3078F DIAST BP <80 MM HG: CPT | Performed by: FAMILY MEDICINE

## 2021-05-20 PROCEDURE — 3008F BODY MASS INDEX DOCD: CPT | Performed by: OTOLARYNGOLOGY

## 2021-05-20 PROCEDURE — 99214 OFFICE O/P EST MOD 30 MIN: CPT | Performed by: FAMILY MEDICINE

## 2021-05-20 PROCEDURE — 3074F SYST BP LT 130 MM HG: CPT | Performed by: OTOLARYNGOLOGY

## 2021-05-20 PROCEDURE — 3074F SYST BP LT 130 MM HG: CPT | Performed by: FAMILY MEDICINE

## 2021-05-20 PROCEDURE — 87086 URINE CULTURE/COLONY COUNT: CPT | Performed by: FAMILY MEDICINE

## 2021-05-20 PROCEDURE — 81003 URINALYSIS AUTO W/O SCOPE: CPT | Performed by: FAMILY MEDICINE

## 2021-05-20 RX ORDER — CIPROFLOXACIN 500 MG/1
500 TABLET, FILM COATED ORAL 2 TIMES DAILY
Qty: 10 TABLET | Refills: 0 | Status: SHIPPED | OUTPATIENT
Start: 2021-05-20 | End: 2021-05-25

## 2021-05-20 NOTE — TELEPHONE ENCOUNTER
We could see her at 4:30 PM today recheck her urine or other options go to urgent care urine culture should be completed.   Thank you

## 2021-05-20 NOTE — PATIENT INSTRUCTIONS
Start antibiotic today per directions. Take probiotic over-the-counter daily like organic yogurt while taking antibiotic. Drink plenty of fluids. The test for C diff from stool. Monitor bowel movements.     If you continue to have episodes of stool inc

## 2021-05-20 NOTE — PROGRESS NOTES
Mal Score is a 41year old female.   Patient presents with:  Post-Op: medtronic endo maxillary antrostomy with tissue removal and endo total ethmoidectomy done on 5/14/2021, pt is doing well       HISTORY OF PRESENT ILLNESS  4/15/2021  Patient prevents Smokeless tobacco: Never Used    Vaping Use      Vaping Use: Never used    Substance and Sexual Activity      Alcohol use: No        Alcohol/week: 0.0 standard drinks      Drug use: No      Sexual activity: Not Currently    Other Topics      Concerns: 186 lb (84.4 kg)   LMP 05/13/2021   BMI 30.95 kg/m²        Constitutional Normal Overall appearance - Normal.   Psychiatric Normal Orientation - Oriented to time, place, person & situation. Appropriate mood and affect.    Neck Exam Normal Inspection - [place] 0  ASSESSMENT AND PLAN    1.  1 week post fess  For odontogenic left-sided maxillary and ethmoid sinusitis.   Doing really well propel implant is in place encourage irrigation and follow-up in a week    2.burning on urination  Recommend a urinalysis     Jose

## 2021-05-20 NOTE — TELEPHONE ENCOUNTER
1. What are your symptoms? UTI? Saw surgeon today who did urinalysis (see result today)    2. How long have you been having these symptoms? 3. Have you done anything already to treat your symptoms?          ADDITIONAL INFO:

## 2021-05-20 NOTE — PROGRESS NOTES
Marycarmen Daigle is a 41year old female. cc dysuria, urinary frequency, loose stools, recent sinus surgery. HPI:   Patient had sinus surgery on Friday last week. She had penetration form extracted tooth to the sinus.   Patient was on 3 courses of antibiotic 10/12, 10/11, 09/10, 09/09    negative hpv negative   • PONV (postoperative nausea and vomiting)    • Psoriasis    • Vertigo       Social History:  Social History    Tobacco Use      Smoking status: Never Smoker      Smokeless tobacco: Never Used    Vaping Urine frequency  (primary encounter diagnosis)  Urinary pain  Loose stools  H/o sinus surgery    Orders Placed This Encounter      Urine Dip, auto without Micro      Urine Culture, Routine [E]      C. diff toxigenic PCR (OPT) [E]      Meds & Refills fo

## 2021-05-20 NOTE — TELEPHONE ENCOUNTER
Dr. Denny Ruffin pt. Pt had sinus surgery on Friday. She had to follow up today with her surgeon. She was having burning with urination and frequency. The surgeons office did a urinalysis that showed moderate blood and bacteria rare.  They recommended pt call

## 2021-05-25 ENCOUNTER — HOSPITAL ENCOUNTER (OUTPATIENT)
Dept: MAMMOGRAPHY | Age: 46
Discharge: HOME | End: 2021-05-25
Attending: FAMILY MEDICINE
Payer: COMMERCIAL

## 2021-05-25 DIAGNOSIS — Z12.31 ENCOUNTER FOR SCREENING MAMMOGRAM FOR MALIGNANT NEOPLASM OF BREAST: ICD-10-CM

## 2021-05-25 PROCEDURE — 77067 SCR MAMMO BI INCL CAD: CPT | Performed by: OBSTETRICS & GYNECOLOGY

## 2021-05-25 PROCEDURE — 77063 BREAST TOMOSYNTHESIS BI: CPT | Performed by: OBSTETRICS & GYNECOLOGY

## 2021-05-27 ENCOUNTER — OFFICE VISIT (OUTPATIENT)
Dept: OTOLARYNGOLOGY | Facility: CLINIC | Age: 46
End: 2021-05-27
Payer: COMMERCIAL

## 2021-05-27 VITALS
DIASTOLIC BLOOD PRESSURE: 78 MMHG | HEIGHT: 65 IN | SYSTOLIC BLOOD PRESSURE: 113 MMHG | BODY MASS INDEX: 30.99 KG/M2 | WEIGHT: 186 LBS | TEMPERATURE: 98 F

## 2021-05-27 DIAGNOSIS — J32.0 CHRONIC MAXILLARY SINUSITIS: Primary | ICD-10-CM

## 2021-05-27 PROCEDURE — 3078F DIAST BP <80 MM HG: CPT | Performed by: OTOLARYNGOLOGY

## 2021-05-27 PROCEDURE — 3074F SYST BP LT 130 MM HG: CPT | Performed by: OTOLARYNGOLOGY

## 2021-05-27 PROCEDURE — 3008F BODY MASS INDEX DOCD: CPT | Performed by: OTOLARYNGOLOGY

## 2021-05-27 PROCEDURE — 99024 POSTOP FOLLOW-UP VISIT: CPT | Performed by: OTOLARYNGOLOGY

## 2021-05-27 NOTE — PROGRESS NOTES
Manuelrosy Arshad is a 41year old female.   Patient presents with:  Post-Op: pt is here for second  post op follow up surgery endo maxillary antrostomy with tissue removal      HISTORY OF PRESENT ILLNESS  4/15/2021  Patient prevents for evaluation of several Smoking status: Never Smoker      Smokeless tobacco: Never Used    Vaping Use      Vaping Use: Never used    Substance and Sexual Activity      Alcohol use: No        Alcohol/week: 0.0 standard drinks      Drug use: No      Sexual activity: Not Currently (1.651 m)   Wt 186 lb (84.4 kg)   LMP 05/13/2021   BMI 30.95 kg/m²        Constitutional Normal Overall appearance - Normal.   Psychiatric Normal Orientation - Oriented to time, place, person & situation. Appropriate mood and affect.    Neck Exam Normal Ins Dragon [employer] voice recognition dictation software. As a result, errors may occur. When identified, these errors have been corrected.  While every attempt is made to correct errors during dictation, discrepancies may still exist

## 2021-06-02 ENCOUNTER — TELEPHONE (OUTPATIENT)
Dept: OTOLARYNGOLOGY | Facility: CLINIC | Age: 46
End: 2021-06-02

## 2021-06-02 ENCOUNTER — OFFICE VISIT (OUTPATIENT)
Dept: OTOLARYNGOLOGY | Facility: CLINIC | Age: 46
End: 2021-06-02
Payer: COMMERCIAL

## 2021-06-02 VITALS
HEIGHT: 65 IN | TEMPERATURE: 99 F | WEIGHT: 186 LBS | SYSTOLIC BLOOD PRESSURE: 116 MMHG | BODY MASS INDEX: 30.99 KG/M2 | DIASTOLIC BLOOD PRESSURE: 78 MMHG

## 2021-06-02 DIAGNOSIS — J32.0 CHRONIC MAXILLARY SINUSITIS: Primary | ICD-10-CM

## 2021-06-02 PROCEDURE — 3074F SYST BP LT 130 MM HG: CPT | Performed by: OTOLARYNGOLOGY

## 2021-06-02 PROCEDURE — 99212 OFFICE O/P EST SF 10 MIN: CPT | Performed by: OTOLARYNGOLOGY

## 2021-06-02 PROCEDURE — 3008F BODY MASS INDEX DOCD: CPT | Performed by: OTOLARYNGOLOGY

## 2021-06-02 PROCEDURE — 3078F DIAST BP <80 MM HG: CPT | Performed by: OTOLARYNGOLOGY

## 2021-06-02 RX ORDER — CIPROFLOXACIN 500 MG/1
500 TABLET, FILM COATED ORAL EVERY 12 HOURS
Qty: 28 TABLET | Refills: 0 | Status: SHIPPED | OUTPATIENT
Start: 2021-06-02 | End: 2021-06-16

## 2021-06-02 RX ORDER — PREDNISONE 20 MG/1
TABLET ORAL
Qty: 7 TABLET | Refills: 0 | Status: SHIPPED | OUTPATIENT
Start: 2021-06-02 | End: 2021-06-15 | Stop reason: ALTCHOICE

## 2021-06-02 RX ORDER — FLUCONAZOLE 150 MG/1
150 TABLET ORAL ONCE
Qty: 1 TABLET | Refills: 0 | Status: SHIPPED | OUTPATIENT
Start: 2021-06-02 | End: 2021-06-02

## 2021-06-02 NOTE — PROGRESS NOTES
Malvin Kapoor is a 41year old female. Patient presents with:   Follow - Up: s/p sinus surgery done on 5/14/21- c/o sinus congestion, headache, sinus pressure started [place] [address]  4/15/2021  Patient prevents for evaluation of se irrigations.   Social History    Socioeconomic History      Marital status:       Spouse name: Not on file      Number of children: Not on file      Years of education: Not on file      Highest education level: Not on file    Tobacco Use      Smokin depression. Integumentary Negative Frequent skin infections, pigment change and rash. Hema/Lymph Negative Easy bleeding and easy bruising.            PHYSICAL EXAM    /78   Temp 98.6 °F (37 °C) (Tympanic)   Ht 5' 5\" (1.651 m)   Wt 186 lb (84.4 kg Cholecalciferol (VITAMIN D) 25 MCG (1000 UT) Oral Tab, Take 1,000 Units by mouth daily. , Disp: , Rfl:   •  Fluocinonide 0.05 % External Solution, Apply once daily as needed. Max 2 weeks. , Disp: 60 mL, Rfl: 2  ASSESSMENT AND PLAN    1.  3 week post fess

## 2021-06-02 NOTE — TELEPHONE ENCOUNTER
Pharmacy calling for drug interaction for predniSONE 20 MG Oral Tab and Ciprofloxacin HCl 500 MG Oral Tab.  Please advise

## 2021-06-02 NOTE — TELEPHONE ENCOUNTER
Dr. Hassie Heimlich, pharmacist states ciprofloxacin and prednisone together can increase risk of tendon rupture.  Patient already picked up prescriptions

## 2021-06-14 ENCOUNTER — HOSPITAL ENCOUNTER (OUTPATIENT)
Facility: HOSPITAL | Age: 46
Setting detail: OBSERVATION
Discharge: HOME | End: 2021-06-15
Attending: EMERGENCY MEDICINE | Admitting: SURGERY
Payer: COMMERCIAL

## 2021-06-14 DIAGNOSIS — K80.20 IMPACTED GALLSTONE OF GALLBLADDER: Primary | ICD-10-CM

## 2021-06-14 RX ORDER — LANSOPRAZOLE 30 MG/1
30 CAPSULE, DELAYED RELEASE ORAL
COMMUNITY
End: 2021-10-21 | Stop reason: ALTCHOICE

## 2021-06-15 ENCOUNTER — TELEPHONE (OUTPATIENT)
Dept: SURGERY | Facility: CLINIC | Age: 46
End: 2021-06-15

## 2021-06-15 ENCOUNTER — APPOINTMENT (OUTPATIENT)
Dept: ULTRASOUND IMAGING | Facility: HOSPITAL | Age: 46
End: 2021-06-15
Attending: EMERGENCY MEDICINE
Payer: COMMERCIAL

## 2021-06-15 VITALS
HEIGHT: 65 IN | BODY MASS INDEX: 31.16 KG/M2 | DIASTOLIC BLOOD PRESSURE: 66 MMHG | OXYGEN SATURATION: 99 % | HEART RATE: 74 BPM | TEMPERATURE: 99 F | SYSTOLIC BLOOD PRESSURE: 118 MMHG | WEIGHT: 187 LBS | RESPIRATION RATE: 18 BRPM

## 2021-06-15 DIAGNOSIS — K81.0 ACUTE CHOLECYSTITIS: Primary | ICD-10-CM

## 2021-06-15 PROBLEM — R73.9 HYPERGLYCEMIA: Status: ACTIVE | Noted: 2021-06-15

## 2021-06-15 PROBLEM — K80.20 IMPACTED GALLSTONE OF GALLBLADDER: Status: ACTIVE | Noted: 2021-06-15

## 2021-06-15 PROCEDURE — 76700 US EXAM ABDOM COMPLETE: CPT | Performed by: EMERGENCY MEDICINE

## 2021-06-15 PROCEDURE — 99244 OFF/OP CNSLTJ NEW/EST MOD 40: CPT | Performed by: SURGERY

## 2021-06-15 RX ORDER — ONDANSETRON 2 MG/ML
4 INJECTION INTRAMUSCULAR; INTRAVENOUS EVERY 6 HOURS PRN
Status: DISCONTINUED | OUTPATIENT
Start: 2021-06-15 | End: 2021-06-15

## 2021-06-15 RX ORDER — HYDROMORPHONE HYDROCHLORIDE 1 MG/ML
0.2 INJECTION, SOLUTION INTRAMUSCULAR; INTRAVENOUS; SUBCUTANEOUS
Status: DISCONTINUED | OUTPATIENT
Start: 2021-06-15 | End: 2021-06-15

## 2021-06-15 RX ORDER — ONDANSETRON 2 MG/ML
4 INJECTION INTRAMUSCULAR; INTRAVENOUS EVERY 4 HOURS PRN
Status: DISCONTINUED | OUTPATIENT
Start: 2021-06-15 | End: 2021-06-15

## 2021-06-15 RX ORDER — HYDROMORPHONE HYDROCHLORIDE 1 MG/ML
0.8 INJECTION, SOLUTION INTRAMUSCULAR; INTRAVENOUS; SUBCUTANEOUS
Status: DISCONTINUED | OUTPATIENT
Start: 2021-06-15 | End: 2021-06-15

## 2021-06-15 RX ORDER — SODIUM CHLORIDE, SODIUM LACTATE, POTASSIUM CHLORIDE, CALCIUM CHLORIDE 600; 310; 30; 20 MG/100ML; MG/100ML; MG/100ML; MG/100ML
INJECTION, SOLUTION INTRAVENOUS CONTINUOUS
Status: DISCONTINUED | OUTPATIENT
Start: 2021-06-15 | End: 2021-06-15

## 2021-06-15 RX ORDER — HYDROMORPHONE HYDROCHLORIDE 1 MG/ML
0.5 INJECTION, SOLUTION INTRAMUSCULAR; INTRAVENOUS; SUBCUTANEOUS EVERY 30 MIN PRN
Status: DISCONTINUED | OUTPATIENT
Start: 2021-06-15 | End: 2021-06-15

## 2021-06-15 RX ORDER — HYDROMORPHONE HYDROCHLORIDE 1 MG/ML
0.4 INJECTION, SOLUTION INTRAMUSCULAR; INTRAVENOUS; SUBCUTANEOUS
Status: DISCONTINUED | OUTPATIENT
Start: 2021-06-15 | End: 2021-06-15

## 2021-06-15 RX ORDER — ACETAMINOPHEN 500 MG
1000 TABLET ORAL ONCE
Status: CANCELLED | OUTPATIENT
Start: 2021-06-15 | End: 2021-06-15

## 2021-06-15 RX ORDER — LEVOFLOXACIN 5 MG/ML
750 INJECTION, SOLUTION INTRAVENOUS EVERY 24 HOURS
Status: DISCONTINUED | OUTPATIENT
Start: 2021-06-15 | End: 2021-06-15

## 2021-06-15 RX ORDER — SODIUM CHLORIDE 9 MG/ML
INJECTION, SOLUTION INTRAVENOUS CONTINUOUS
Status: DISCONTINUED | OUTPATIENT
Start: 2021-06-15 | End: 2021-06-15

## 2021-06-15 NOTE — PLAN OF CARE
NURSING ADMISSION NOTE      Patient admitted via Cart  Oriented to room. Safety precautions initiated. Bed in low position. Call light in reach. Patient arrives to the floor A&Ox4. VSS. C/O little pain. IVF and abx initiated. Voiding, bm 6/14.  Pa frequently for physical needs  - Identify cognitive and physical deficits and behaviors that affect risk of falls.   - [place] fall precautions as indicated by assessment.  - Educate pt/family on patient safety including physical limitations  - Instruct p

## 2021-06-15 NOTE — ED INITIAL ASSESSMENT (HPI)
Patient here with c/o mid epigastric pain that she has been dealing with for years but has gotten worse in last 3 days. Denies N/V/D and fever.   Patient reports she has been taking her omeprazole and other OTC medications for her stomach and they are not

## 2021-06-15 NOTE — PLAN OF CARE
Problem: Patient/Family Goals  Goal: Patient/Family Long Term Goal  Description: Patient's Long Term Goal: Discharge    Interventions:  - IVF/ abx, ADAT, removal of gallbladder   - See additional Care Plan goals for specific interventions  Outcome: Adequ assessment  - Modify environment to reduce risk of injury  - Provide assistive devices as appropriate  - Consider OT/PT consult to assist with strengthening/mobility  - Encourage toileting schedule  Outcome: Adequate for Discharge   Alert and orient x 4 ,

## 2021-06-15 NOTE — CONSULTS
[facility]  Report of Consultation    Gretchen Owens Patient Status:  Observation    1975 MRN [id number]   [facility] 3NW-A Attending Sang Pina,    Hosp Day # 0 PCP Jose Thurman MD     Reason for Consultation:  Cris Blunt Diagnosis Date   • Esophageal reflux    • Heartburn    • Hemorrhoid    • High blood pressure    • Human papilloma virus infection     20 year ago per pt   • Pap smear for cervical cancer screening 05/16, 12/14, 10/12, 10/11, 09/10, 09/09    negative hpv Diagnoses:Vitamin D deficiency    Fluocinonide 0.05 % External Solution  Apply once daily as needed. Max 2 weeks.   Qty: 60 mL Refills: 2  Associated Diagnoses:Psoriasis of scalp          Review of Systems:    Allergic/Immuno:  Penicillins  Cardiovascular: ALT 34 06/15/2021    AST 15 06/15/2021    BILT 0.4 06/15/2021    ALB 3.6 06/15/2021    TP 7.3 06/15/2021     US Abdomen  FINDINGS:     LIVER:  Slightly increased in echogenicity suggests mild steatosis. No significant masses.    BILIARY:  Cholelithiasis does not require emergent surgical interventions  2.  Due to OR scheduling and long history of similar episodes, the patient is scheduled for outpatient laparoscopic cholecystectomy with cholangiogram on Friday, 6/18/2021.   3. Advance to full liquid diet Discussed at length with patient the procedure of laparoscopic possible open cholecystectomy the risks of the surgery to include bleeding infection pneumonia DVT and injury to the common bile duct. All questions answered.     I, Dr. Saji Milligan, personall

## 2021-06-15 NOTE — PROGRESS NOTES
NURSING DISCHARGE NOTE    Discharged Home via Wheelchair. Accompanied by Family member and Support staff  Belongings Taken by patient/family. Discussed  discharge instructions with patient and family . Answered all questions .  Verbalized understanding

## 2021-06-15 NOTE — PROGRESS NOTES
Pharmacy Note: Renal dose adjustment of Levaquin    Susi Jain is a 41year old female who has been prescribed Levaquin 500mg every 24 hours.   CrCl is 81.8 ml/min so the dose has been adjusted  to 750mg every 24 hours per hospital renal dose adjustment

## 2021-06-16 NOTE — ED PROVIDER NOTES
Patient Seen in: [facility] 3nw-a      History   Patient presents with:  Abdominal Pain    Stated Complaint: Mid-epigastric pain for years but worse in last 3 days.     HPI/Subjective:   HPI    18-year-old presents to the emergency department with mi reviewed and negative except as noted above.     Physical Exam     ED Triage Vitals [06/14/21 2351]   /90   Pulse 89   Resp 16   Temp 97.5 °F (36.4 °C)   Temp src Temporal   SpO2 100 %   O2 Device None (Room air)       Current:/66 (BP Location: place, and time. Cranial Nerves: No cranial nerve deficit.       Coordination: Coordination normal.   Psychiatric:         Behavior: Behavior normal.         Judgment: Judgment normal.             ED Course     Labs Reviewed   COMP METABOLIC PANEL (14) Mid-epigastric pain for years but worse in last 3 days. TECHNIQUE:  Real time gray-scale ultrasound was used to evaluate the     abdomen.   The exam includes images of the liver, gallbladder, common bile     duct, pancreas, spleen, kidneys, IVC, and am    Follow-up:  No follow-up provider specified.         Medications Prescribed:  Discharge Medication List as of 6/15/2021 11:31 AM                          Hospital Problems     Present on Admission  Date Reviewed: 6/2/2021        ICD-10-CM Noted POA

## 2021-06-17 ENCOUNTER — TELEPHONE (OUTPATIENT)
Dept: FAMILY MEDICINE CLINIC | Facility: CLINIC | Age: 46
End: 2021-06-17

## 2021-06-17 ENCOUNTER — ANESTHESIA EVENT (OUTPATIENT)
Dept: SURGERY | Facility: HOSPITAL | Age: 46
End: 2021-06-17
Payer: COMMERCIAL

## 2021-06-17 NOTE — TELEPHONE ENCOUNTER
Pt was concerned she may still have a sinus infection and she is to have gallbladder surgery tomorrow. She had sinus surgery 2 weeks ago. She finishes the antibiotic she ws prescribed after the sinus surgery today.  She is still blowing a lot of green mucou

## 2021-06-17 NOTE — TELEPHONE ENCOUNTER
Patient  Is scheduled to have her gallbladder removed tomorrow. Patient has some questions and wants to speak to a nurse.

## 2021-06-18 ENCOUNTER — HOSPITAL ENCOUNTER (OUTPATIENT)
Facility: HOSPITAL | Age: 46
Setting detail: HOSPITAL OUTPATIENT SURGERY
Discharge: HOME | End: 2021-06-18
Attending: SURGERY | Admitting: SURGERY
Payer: COMMERCIAL

## 2021-06-18 ENCOUNTER — ANESTHESIA (OUTPATIENT)
Dept: SURGERY | Facility: HOSPITAL | Age: 46
End: 2021-06-18
Payer: COMMERCIAL

## 2021-06-18 ENCOUNTER — APPOINTMENT (OUTPATIENT)
Dept: GENERAL RADIOLOGY | Facility: HOSPITAL | Age: 46
End: 2021-06-18
Attending: SURGERY
Payer: COMMERCIAL

## 2021-06-18 VITALS
HEART RATE: 63 BPM | HEIGHT: 65 IN | OXYGEN SATURATION: 97 % | SYSTOLIC BLOOD PRESSURE: 109 MMHG | DIASTOLIC BLOOD PRESSURE: 70 MMHG | WEIGHT: 185.44 LBS | BODY MASS INDEX: 30.89 KG/M2 | TEMPERATURE: 97 F | RESPIRATION RATE: 20 BRPM

## 2021-06-18 DIAGNOSIS — K81.0 ACUTE CHOLECYSTITIS: ICD-10-CM

## 2021-06-18 DIAGNOSIS — G89.18 POST-OPERATIVE PAIN: Primary | ICD-10-CM

## 2021-06-18 PROCEDURE — 3008F BODY MASS INDEX DOCD: CPT | Performed by: SURGERY

## 2021-06-18 PROCEDURE — 0FT44ZZ RESECTION OF GALLBLADDER, PERCUTANEOUS ENDOSCOPIC APPROACH: ICD-10-PCS | Performed by: SURGERY

## 2021-06-18 PROCEDURE — 3078F DIAST BP <80 MM HG: CPT | Performed by: SURGERY

## 2021-06-18 PROCEDURE — 74300 X-RAY BILE DUCTS/PANCREAS: CPT | Performed by: SURGERY

## 2021-06-18 PROCEDURE — BF111ZZ FLUOROSCOPY OF BILIARY AND PANCREATIC DUCTS USING LOW OSMOLAR CONTRAST: ICD-10-PCS | Performed by: SURGERY

## 2021-06-18 PROCEDURE — 47563 LAPARO CHOLECYSTECTOMY/GRAPH: CPT | Performed by: SURGERY

## 2021-06-18 PROCEDURE — 3074F SYST BP LT 130 MM HG: CPT | Performed by: SURGERY

## 2021-06-18 RX ORDER — ACETAMINOPHEN 500 MG
1000 TABLET ORAL ONCE AS NEEDED
Status: DISCONTINUED | OUTPATIENT
Start: 2021-06-18 | End: 2021-06-18

## 2021-06-18 RX ORDER — ACETAMINOPHEN 500 MG
1000 TABLET ORAL EVERY 6 HOURS PRN
COMMUNITY
End: 2021-06-28 | Stop reason: ALTCHOICE

## 2021-06-18 RX ORDER — HYDROCODONE BITARTRATE AND ACETAMINOPHEN 5; 325 MG/1; MG/1
1 TABLET ORAL AS NEEDED
Status: COMPLETED | OUTPATIENT
Start: 2021-06-18 | End: 2021-06-18

## 2021-06-18 RX ORDER — HYDROCODONE BITARTRATE AND ACETAMINOPHEN 5; 325 MG/1; MG/1
2 TABLET ORAL AS NEEDED
Status: COMPLETED | OUTPATIENT
Start: 2021-06-18 | End: 2021-06-18

## 2021-06-18 RX ORDER — HYDROMORPHONE HYDROCHLORIDE 1 MG/ML
INJECTION, SOLUTION INTRAMUSCULAR; INTRAVENOUS; SUBCUTANEOUS
Status: COMPLETED
Start: 2021-06-18 | End: 2021-06-18

## 2021-06-18 RX ORDER — BUPIVACAINE HYDROCHLORIDE 5 MG/ML
INJECTION, SOLUTION EPIDURAL; INTRACAUDAL AS NEEDED
Status: DISCONTINUED | OUTPATIENT
Start: 2021-06-18 | End: 2021-06-18 | Stop reason: HOSPADM

## 2021-06-18 RX ORDER — ONDANSETRON 2 MG/ML
4 INJECTION INTRAMUSCULAR; INTRAVENOUS AS NEEDED
Status: DISCONTINUED | OUTPATIENT
Start: 2021-06-18 | End: 2021-06-18

## 2021-06-18 RX ORDER — CEFOXITIN 2 G/1
INJECTION, POWDER, FOR SOLUTION INTRAVENOUS AS NEEDED
Status: DISCONTINUED | OUTPATIENT
Start: 2021-06-18 | End: 2021-06-18 | Stop reason: SURG

## 2021-06-18 RX ORDER — DEXAMETHASONE SODIUM PHOSPHATE 4 MG/ML
VIAL (ML) INJECTION AS NEEDED
Status: DISCONTINUED | OUTPATIENT
Start: 2021-06-18 | End: 2021-06-18 | Stop reason: SURG

## 2021-06-18 RX ORDER — HYDROMORPHONE HYDROCHLORIDE 1 MG/ML
0.4 INJECTION, SOLUTION INTRAMUSCULAR; INTRAVENOUS; SUBCUTANEOUS EVERY 5 MIN PRN
Status: DISCONTINUED | OUTPATIENT
Start: 2021-06-18 | End: 2021-06-18

## 2021-06-18 RX ORDER — MIDAZOLAM HYDROCHLORIDE 1 MG/ML
1 INJECTION INTRAMUSCULAR; INTRAVENOUS EVERY 5 MIN PRN
Status: DISCONTINUED | OUTPATIENT
Start: 2021-06-18 | End: 2021-06-18

## 2021-06-18 RX ORDER — ROCURONIUM BROMIDE 10 MG/ML
INJECTION, SOLUTION INTRAVENOUS AS NEEDED
Status: DISCONTINUED | OUTPATIENT
Start: 2021-06-18 | End: 2021-06-18 | Stop reason: SURG

## 2021-06-18 RX ORDER — GLYCOPYRROLATE 0.2 MG/ML
INJECTION, SOLUTION INTRAMUSCULAR; INTRAVENOUS AS NEEDED
Status: DISCONTINUED | OUTPATIENT
Start: 2021-06-18 | End: 2021-06-18 | Stop reason: SURG

## 2021-06-18 RX ORDER — METOCLOPRAMIDE HYDROCHLORIDE 5 MG/ML
10 INJECTION INTRAMUSCULAR; INTRAVENOUS AS NEEDED
Status: DISCONTINUED | OUTPATIENT
Start: 2021-06-18 | End: 2021-06-18

## 2021-06-18 RX ORDER — CLINDAMYCIN PHOSPHATE 900 MG/50ML
INJECTION INTRAVENOUS
Status: DISCONTINUED
Start: 2021-06-18 | End: 2021-06-18

## 2021-06-18 RX ORDER — NEOSTIGMINE METHYLSULFATE 1 MG/ML
INJECTION INTRAVENOUS AS NEEDED
Status: DISCONTINUED | OUTPATIENT
Start: 2021-06-18 | End: 2021-06-18 | Stop reason: SURG

## 2021-06-18 RX ORDER — SODIUM CHLORIDE, SODIUM LACTATE, POTASSIUM CHLORIDE, CALCIUM CHLORIDE 600; 310; 30; 20 MG/100ML; MG/100ML; MG/100ML; MG/100ML
INJECTION, SOLUTION INTRAVENOUS CONTINUOUS
Status: DISCONTINUED | OUTPATIENT
Start: 2021-06-18 | End: 2021-06-18

## 2021-06-18 RX ORDER — HYDROCODONE BITARTRATE AND ACETAMINOPHEN 5; 325 MG/1; MG/1
1 TABLET ORAL EVERY 6 HOURS PRN
Qty: 20 TABLET | Refills: 0 | Status: SHIPPED | OUTPATIENT
Start: 2021-06-18 | End: 2021-06-28

## 2021-06-18 RX ORDER — KETOROLAC TROMETHAMINE 30 MG/ML
INJECTION, SOLUTION INTRAMUSCULAR; INTRAVENOUS AS NEEDED
Status: DISCONTINUED | OUTPATIENT
Start: 2021-06-18 | End: 2021-06-18 | Stop reason: SURG

## 2021-06-18 RX ORDER — CLINDAMYCIN PHOSPHATE 900 MG/50ML
900 INJECTION INTRAVENOUS ONCE
Status: DISCONTINUED | OUTPATIENT
Start: 2021-06-18 | End: 2021-06-18 | Stop reason: HOSPADM

## 2021-06-18 RX ORDER — HEPARIN SODIUM 5000 [USP'U]/ML
5000 INJECTION, SOLUTION INTRAVENOUS; SUBCUTANEOUS ONCE
Status: COMPLETED | OUTPATIENT
Start: 2021-06-18 | End: 2021-06-18

## 2021-06-18 RX ORDER — ONDANSETRON 2 MG/ML
INJECTION INTRAMUSCULAR; INTRAVENOUS AS NEEDED
Status: DISCONTINUED | OUTPATIENT
Start: 2021-06-18 | End: 2021-06-18 | Stop reason: SURG

## 2021-06-18 RX ORDER — LABETALOL HYDROCHLORIDE 5 MG/ML
5 INJECTION, SOLUTION INTRAVENOUS EVERY 5 MIN PRN
Status: DISCONTINUED | OUTPATIENT
Start: 2021-06-18 | End: 2021-06-18

## 2021-06-18 RX ORDER — LIDOCAINE HYDROCHLORIDE 10 MG/ML
INJECTION, SOLUTION EPIDURAL; INFILTRATION; INTRACAUDAL; PERINEURAL AS NEEDED
Status: DISCONTINUED | OUTPATIENT
Start: 2021-06-18 | End: 2021-06-18 | Stop reason: SURG

## 2021-06-18 RX ORDER — NALOXONE HYDROCHLORIDE 0.4 MG/ML
80 INJECTION, SOLUTION INTRAMUSCULAR; INTRAVENOUS; SUBCUTANEOUS AS NEEDED
Status: DISCONTINUED | OUTPATIENT
Start: 2021-06-18 | End: 2021-06-18

## 2021-06-18 RX ORDER — MEPERIDINE HYDROCHLORIDE 25 MG/ML
12.5 INJECTION INTRAMUSCULAR; INTRAVENOUS; SUBCUTANEOUS AS NEEDED
Status: DISCONTINUED | OUTPATIENT
Start: 2021-06-18 | End: 2021-06-18

## 2021-06-18 RX ADMIN — NEOSTIGMINE METHYLSULFATE 4 MG: 1 INJECTION INTRAVENOUS at 12:30:00

## 2021-06-18 RX ADMIN — GLYCOPYRROLATE 0.4 MG: 0.2 INJECTION, SOLUTION INTRAMUSCULAR; INTRAVENOUS at 12:30:00

## 2021-06-18 RX ADMIN — SODIUM CHLORIDE, SODIUM LACTATE, POTASSIUM CHLORIDE, CALCIUM CHLORIDE: 600; 310; 30; 20 INJECTION, SOLUTION INTRAVENOUS at 12:41:00

## 2021-06-18 RX ADMIN — ONDANSETRON 4 MG: 2 INJECTION INTRAMUSCULAR; INTRAVENOUS at 12:26:00

## 2021-06-18 RX ADMIN — CEFOXITIN 2 G: 2 INJECTION, POWDER, FOR SOLUTION INTRAVENOUS at 11:30:00

## 2021-06-18 RX ADMIN — ROCURONIUM BROMIDE 40 MG: 10 INJECTION, SOLUTION INTRAVENOUS at 11:29:00

## 2021-06-18 RX ADMIN — DEXAMETHASONE SODIUM PHOSPHATE 8 MG: 4 MG/ML VIAL (ML) INJECTION at 11:29:00

## 2021-06-18 RX ADMIN — LIDOCAINE HYDROCHLORIDE 50 MG: 10 INJECTION, SOLUTION EPIDURAL; INFILTRATION; INTRACAUDAL; PERINEURAL at 11:29:00

## 2021-06-18 RX ADMIN — KETOROLAC TROMETHAMINE 30 MG: 30 INJECTION, SOLUTION INTRAMUSCULAR; INTRAVENOUS at 12:26:00

## 2021-06-18 NOTE — H&P
Claudette Lundberg is a(n) 41year old female who presented to the [facility] ED last night with worsening epigastric and RUQ abdominal pain after eating potato salad at lunch. The pain in cramping in nature and lasts for hours.  She does not know if the pain radia vomiting)     • Psoriasis     • Vertigo                 Past Surgical History:   Procedure Laterality Date   • HERNIA SURGERY   10/1/2003     Left   • NASAL SCOPY,REMV PART ETHMOID   05/14/2021   • NASAL SCOPY,RMV TISS MAXILL SINUS Left 05/14/2021   • ORAL Negative for cool extremity and irregular heartbeat/palpitations  Constitutional:  Negative for decreased activity, fever, irritability and lethargy  Gastrointestinal:  Positive for abdominal pain and decreased appetite.  Negative for constipation, diarrhea significant masses. BILIARY:  Cholelithiasis includes a 2 cm gallstone impacted at the gallbladder neck.  No wall thickening or pericholecystic fluid.  No biliary dilatation.  Common bile duct diameter is 4 mm.    PANCREAS:  Normal.   SPLEEN:  Normal.   K Friday, 6/18/2021.   3. Advance to full liquid diet  4. OK for discharge if tolerating diet and pain is controlled        I spent 23 minutes face to face with the patient.  More than 50% of that time was spent counseling the patient and/or on coordination o questions answered.

## 2021-06-18 NOTE — OPERATIVE REPORT
[facility]  Operative Note     Norm Torie Location: OR   CSN [id number] MRN [id number]   Admission Date 6/18/2021 Operation Date 6/18/2021   Attending Physician Lili Park DO Operating Physician Layton Ballesteros DO      Preoperative Diagnosis: Ac Initially a guidewire was passed into the cystic duct followed by a ureteral catheter. This was followed by C-arm cholangiography demonstrating normal intra-and extrahepatic biliary ductal anatomy with easy flow of contrast into the duodenum.   Cholangiogr

## 2021-06-18 NOTE — ANESTHESIA POSTPROCEDURE EVALUATION
[address] Patient Status:  Hospital Outpatient Surgery   Age/Gender 41year old female MRN [id number]   Location [address] [facility] Attending Pérez Reyes DO   Hosp Day # 0 PCP Gricelda Nicholson PA-C

## 2021-06-18 NOTE — ANESTHESIA PROCEDURE NOTES
Airway  Date/Time: 6/18/2021 11:33 AM  Urgency: elective    Airway not difficult    General Information and Staff    Patient location during procedure: OR  Anesthesiologist: Clari Greenwood MD  Performed: anesthesiologist     Indications and Patient Cond

## 2021-06-18 NOTE — ANESTHESIA PREPROCEDURE EVALUATION
PRE-OP EVALUATION    Patient Name: Jean Carlos Mayer    Admit Diagnosis: Acute cholecystitis [K81.0]    Pre-op Diagnosis: Acute cholecystitis [K81.0]    LAPAROSCOPIC CHOLECYSTECTOMY WITH CHOLANGIOGRAM    Anesthesia Procedure: LAPAROSCOPIC CHOLECYSTECTOMY WITH Component Value Date    WBC 11.6 (H) 06/15/2021    RBC 4.74 06/15/2021    HGB 13.6 06/15/2021    HCT 41.9 06/15/2021    MCV 88.4 06/15/2021    MCH 28.7 06/15/2021    MCHC 32.5 06/15/2021    RDW 13.0 06/15/2021    .0 (H) 06/15/2021     Lab Results

## 2021-06-28 ENCOUNTER — OFFICE VISIT (OUTPATIENT)
Dept: SURGERY | Facility: CLINIC | Age: 46
End: 2021-06-28

## 2021-06-28 VITALS
DIASTOLIC BLOOD PRESSURE: 80 MMHG | BODY MASS INDEX: 31 KG/M2 | HEART RATE: 68 BPM | TEMPERATURE: 98 F | WEIGHT: 185 LBS | RESPIRATION RATE: 16 BRPM | SYSTOLIC BLOOD PRESSURE: 136 MMHG

## 2021-06-28 DIAGNOSIS — K80.10 CALCULUS OF GALLBLADDER WITH CHRONIC CHOLECYSTITIS WITHOUT OBSTRUCTION: ICD-10-CM

## 2021-06-28 DIAGNOSIS — Z90.49 S/P LAPAROSCOPIC CHOLECYSTECTOMY: ICD-10-CM

## 2021-06-28 DIAGNOSIS — K80.20 IMPACTED GALLSTONE OF GALLBLADDER: Primary | ICD-10-CM

## 2021-06-28 PROCEDURE — 99024 POSTOP FOLLOW-UP VISIT: CPT | Performed by: PHYSICIAN ASSISTANT

## 2021-06-28 PROCEDURE — 3075F SYST BP GE 130 - 139MM HG: CPT | Performed by: PHYSICIAN ASSISTANT

## 2021-06-28 PROCEDURE — 3079F DIAST BP 80-89 MM HG: CPT | Performed by: PHYSICIAN ASSISTANT

## 2021-06-28 NOTE — PROGRESS NOTES
Post Operative Visit Note       Active Problems  1. Impacted gallstone of gallbladder    2. Calculus of gallbladder with chronic cholecystitis without obstruction    3.  S/P laparoscopic cholecystectomy         Chief Complaint   Patient presents with:  Post social history have been reviewed by me today.     Family History   Problem Relation Age of Onset   • Breast Cancer Paternal Grandmother 36        In her 42's   • Diabetes Father    • Heart Disorder Father    • Diabetes Maternal Grandfather    • Heart Disor pain, palpitations and leg swelling. Gastrointestinal: Negative for abdominal distention, abdominal pain, anal bleeding, blood in stool, constipation, diarrhea, nausea and vomiting.    Genitourinary: Negative for difficulty urinating, dysuria, frequency a rigidity. Right lower leg: No edema. Left lower leg: No edema. Skin:     General: Skin is warm and dry. Coloration: Skin is not jaundiced or pale. Findings: No erythema or rash. Neurological:      General: No focal deficit present. expressed her understanding and agrees with this plan. No orders of the defined types were placed in this encounter. Imaging & Referrals   None    Follow Up  Return if symptoms worsen or fail to improve.     Geoff Mason PA-C

## 2021-07-12 ENCOUNTER — OFFICE VISIT (OUTPATIENT)
Dept: SURGERY | Facility: CLINIC | Age: 46
End: 2021-07-12
Payer: COMMERCIAL

## 2021-07-12 VITALS
HEIGHT: 65 IN | HEART RATE: 92 BPM | TEMPERATURE: 98 F | WEIGHT: 185.19 LBS | SYSTOLIC BLOOD PRESSURE: 123 MMHG | DIASTOLIC BLOOD PRESSURE: 81 MMHG | BODY MASS INDEX: 30.85 KG/M2

## 2021-07-12 DIAGNOSIS — K40.90 RIGHT INGUINAL HERNIA: Primary | ICD-10-CM

## 2021-07-12 DIAGNOSIS — R10.32 LEFT GROIN PAIN: ICD-10-CM

## 2021-07-12 PROCEDURE — 99214 OFFICE O/P EST MOD 30 MIN: CPT | Performed by: SURGERY

## 2021-07-12 PROCEDURE — 3008F BODY MASS INDEX DOCD: CPT | Performed by: SURGERY

## 2021-07-12 PROCEDURE — 3079F DIAST BP 80-89 MM HG: CPT | Performed by: SURGERY

## 2021-07-12 PROCEDURE — 3074F SYST BP LT 130 MM HG: CPT | Performed by: SURGERY

## 2021-07-12 NOTE — PROGRESS NOTES
Follow Up Visit Note       Active Problems      1. Right inguinal hernia    2. Left groin pain          Chief Complaint   Patient presents with:  Hernia: Inguinal hernia - c/o Pt sharp constant pain at a 4/10 LLQ.   Pt states feels lower abdominal pain on l The family history and social history have been reviewed by me today.     Family History   Problem Relation Age of Onset   • Breast Cancer Paternal Grandmother 36        In her 42's   • Diabetes Father    • Heart Disorder Father    • Diabetes Maternal palpitations and leg swelling. Gastrointestinal: Negative for abdominal distention, abdominal pain, anal bleeding, blood in stool, constipation, diarrhea, nausea and vomiting.    Genitourinary: Negative for difficulty urinating, dysuria, frequency and urg Thought Content:  Thought content normal.         Judgment: Judgment normal.          Assessment   Right inguinal hernia  (primary encounter diagnosis)  Left groin pain    Plan   · I have asked the patient undergo a left groin ultrasound to rule out

## 2021-07-13 ENCOUNTER — OFFICE VISIT (OUTPATIENT)
Dept: SURGERY | Facility: CLINIC | Age: 46
End: 2021-07-13
Payer: COMMERCIAL

## 2021-07-13 VITALS — HEIGHT: 65 IN | WEIGHT: 184 LBS | BODY MASS INDEX: 30.66 KG/M2

## 2021-07-13 DIAGNOSIS — E65 ABDOMINAL PANNUS: Primary | ICD-10-CM

## 2021-07-13 PROCEDURE — 3008F BODY MASS INDEX DOCD: CPT | Performed by: SURGERY

## 2021-07-13 PROCEDURE — 99212 OFFICE O/P EST SF 10 MIN: CPT | Performed by: SURGERY

## 2021-07-13 NOTE — PATIENT INSTRUCTIONS
Surgeon:         Dr. Cheryl Angulo                                        Tel:         [phone]                                  Fax:        [phone]    Surgery/Procedure:         Panniculectomy. 2 hours, general anesthesia, outpatient.  Joint with RAFFY

## 2021-07-13 NOTE — PROGRESS NOTES
Gilda Tuttle is a 41year old female who presents today for a follow-up. Patient relates that she has lost proximately 5 pound since her last visit. The patient also relates that she underwent laparoscopic cholecystectomy approximately 1 month ago.   Hesham Lechuga

## 2021-07-21 ENCOUNTER — TELEPHONE (OUTPATIENT)
Dept: OBGYN CLINIC | Facility: CLINIC | Age: 46
End: 2021-07-21

## 2021-07-21 NOTE — TELEPHONE ENCOUNTER
Spoke with patient regarding symptoms. She has been having spotting/cramping for the last week, which is not typical for her due to the continuous birth control.  She stated that she has been through a lot in the last month and wondered if it was due to mul

## 2021-07-21 NOTE — TELEPHONE ENCOUNTER
Pt is on the continuous pill for 13 years and lately she has been spotting, and clotting, and some questions for you. Please advise and call pt.  She will have a cpr class from 3:00 pm to 5:00 pm today and tomorrow she wont be available until after 2:00 pm.

## 2021-08-03 RX ORDER — LEVONORGESTREL / ETHINYL ESTRADIOL AND ETHINYL ESTRADIOL 150-30(84)
KIT ORAL
Qty: 91 TABLET | Refills: 0 | Status: SHIPPED | OUTPATIENT
Start: 2021-08-03 | End: 2021-11-01

## 2021-08-18 ENCOUNTER — OFFICE VISIT (OUTPATIENT)
Dept: OBGYN CLINIC | Facility: CLINIC | Age: 46
End: 2021-08-18
Payer: COMMERCIAL

## 2021-08-18 VITALS
SYSTOLIC BLOOD PRESSURE: 118 MMHG | WEIGHT: 184 LBS | BODY MASS INDEX: 30.66 KG/M2 | HEART RATE: 81 BPM | DIASTOLIC BLOOD PRESSURE: 64 MMHG | HEIGHT: 65 IN

## 2021-08-18 DIAGNOSIS — N93.9 ABNORMAL VAGINAL BLEEDING: Primary | ICD-10-CM

## 2021-08-18 DIAGNOSIS — N88.9 LESION OF CERVIX: ICD-10-CM

## 2021-08-18 PROCEDURE — 3078F DIAST BP <80 MM HG: CPT | Performed by: STUDENT IN AN ORGANIZED HEALTH CARE EDUCATION/TRAINING PROGRAM

## 2021-08-18 PROCEDURE — 99214 OFFICE O/P EST MOD 30 MIN: CPT | Performed by: STUDENT IN AN ORGANIZED HEALTH CARE EDUCATION/TRAINING PROGRAM

## 2021-08-18 PROCEDURE — 3008F BODY MASS INDEX DOCD: CPT | Performed by: STUDENT IN AN ORGANIZED HEALTH CARE EDUCATION/TRAINING PROGRAM

## 2021-08-18 PROCEDURE — 3074F SYST BP LT 130 MM HG: CPT | Performed by: STUDENT IN AN ORGANIZED HEALTH CARE EDUCATION/TRAINING PROGRAM

## 2021-08-18 NOTE — PROGRESS NOTES
Martín Vogt is a 41year old female [id number] Patient's last menstrual period was 05/13/2021 (lmp unknown). Patient presents with:  Gyn Problem: for 13 yr pt on Cont. OCP, in july bleeding for 3 weeks, along with cramping and blood clots  .     CC: abnorma 06/18/2021   • Nasal scopy,remv part ethmoid  05/14/2021   • Nasal scopy,rmv tiss maxill sinus Left 05/14/2021   • Oral surgery     • Removal of ovarian cyst(s)  2007       SOCIAL HISTORY:  Social History    Socioeconomic History      Marital status: Janice Khalil Organizations:       Attends Club or Organization Meetings:       Marital Status:   Intimate Partner Violence:       Fear of Current or Ex-Partner:       Emotionally Abused:       Physically Abused:       Sexually Abused:     FAMILY HISTORY:  Family Histor Abnormal vaginal bleeding - spotting x several weeks while on continuous OCPs  -advised stopping continuous OCPs for 3-5 days to allow for withdrawal bleed, then OK to restart  -discussed endometrial biopsy if persistent, however now that planning for colp

## 2021-08-30 ENCOUNTER — OFFICE VISIT (OUTPATIENT)
Dept: OBGYN CLINIC | Facility: CLINIC | Age: 46
End: 2021-08-30
Payer: COMMERCIAL

## 2021-08-30 ENCOUNTER — TELEPHONE (OUTPATIENT)
Dept: OBGYN CLINIC | Facility: CLINIC | Age: 46
End: 2021-08-30

## 2021-08-30 VITALS
SYSTOLIC BLOOD PRESSURE: 103 MMHG | HEIGHT: 65 IN | BODY MASS INDEX: 30.32 KG/M2 | HEART RATE: 79 BPM | WEIGHT: 182 LBS | DIASTOLIC BLOOD PRESSURE: 80 MMHG

## 2021-08-30 DIAGNOSIS — Z01.812 PRE-PROCEDURAL LABORATORY EXAMINATION: ICD-10-CM

## 2021-08-30 DIAGNOSIS — N88.9 LESION OF CERVIX: Primary | ICD-10-CM

## 2021-08-30 LAB
CONTROL LINE PRESENT WITH A CLEAR BACKGROUND (YES/NO): YES YES/NO
PREGNANCY TEST, URINE: NEGATIVE

## 2021-08-30 PROCEDURE — 3008F BODY MASS INDEX DOCD: CPT | Performed by: STUDENT IN AN ORGANIZED HEALTH CARE EDUCATION/TRAINING PROGRAM

## 2021-08-30 PROCEDURE — 81025 URINE PREGNANCY TEST: CPT | Performed by: STUDENT IN AN ORGANIZED HEALTH CARE EDUCATION/TRAINING PROGRAM

## 2021-08-30 PROCEDURE — 3079F DIAST BP 80-89 MM HG: CPT | Performed by: STUDENT IN AN ORGANIZED HEALTH CARE EDUCATION/TRAINING PROGRAM

## 2021-08-30 PROCEDURE — 88305 TISSUE EXAM BY PATHOLOGIST: CPT | Performed by: STUDENT IN AN ORGANIZED HEALTH CARE EDUCATION/TRAINING PROGRAM

## 2021-08-30 PROCEDURE — 57455 BIOPSY OF CERVIX W/SCOPE: CPT | Performed by: STUDENT IN AN ORGANIZED HEALTH CARE EDUCATION/TRAINING PROGRAM

## 2021-08-30 PROCEDURE — 3074F SYST BP LT 130 MM HG: CPT | Performed by: STUDENT IN AN ORGANIZED HEALTH CARE EDUCATION/TRAINING PROGRAM

## 2021-08-30 NOTE — TELEPHONE ENCOUNTER
Patient is schedule for colposcopy today and is having very scant amount of brownish bleeding. Patient wants to know if she can still have her procedure. Will send message to Dr. Ericka Rust to verify.  Ok to leave message for patient

## 2021-08-30 NOTE — PROGRESS NOTES
Colpo w/Cx Biopsy    Pregnancy Results: negative from urine test     Consent signed. Procedure discussed with patient in detail including indication, risk, benefits, alternatives and complications.     Procedure:  Patient was put in the dorsal lithotomy po

## 2021-09-20 ENCOUNTER — APPOINTMENT (OUTPATIENT)
Dept: CT IMAGING | Age: 46
End: 2021-09-20
Attending: EMERGENCY MEDICINE
Payer: COMMERCIAL

## 2021-09-20 ENCOUNTER — HOSPITAL ENCOUNTER (EMERGENCY)
Age: 46
Discharge: HOME | End: 2021-09-20
Attending: EMERGENCY MEDICINE
Payer: COMMERCIAL

## 2021-09-20 ENCOUNTER — HOSPITAL ENCOUNTER (OUTPATIENT)
Age: 46
Discharge: ANOTHER HOSPITAL | End: 2021-09-20
Attending: EMERGENCY MEDICINE
Payer: COMMERCIAL

## 2021-09-20 VITALS
HEART RATE: 85 BPM | RESPIRATION RATE: 18 BRPM | TEMPERATURE: 98 F | BODY MASS INDEX: 31 KG/M2 | DIASTOLIC BLOOD PRESSURE: 94 MMHG | OXYGEN SATURATION: 99 % | SYSTOLIC BLOOD PRESSURE: 137 MMHG | WEIGHT: 185 LBS

## 2021-09-20 VITALS
OXYGEN SATURATION: 99 % | SYSTOLIC BLOOD PRESSURE: 149 MMHG | DIASTOLIC BLOOD PRESSURE: 91 MMHG | HEART RATE: 94 BPM | TEMPERATURE: 97 F | WEIGHT: 185 LBS | HEIGHT: 65 IN | RESPIRATION RATE: 16 BRPM | BODY MASS INDEX: 30.82 KG/M2

## 2021-09-20 DIAGNOSIS — R10.30 LOWER ABDOMINAL PAIN: Primary | ICD-10-CM

## 2021-09-20 DIAGNOSIS — R19.7 DIARRHEA, UNSPECIFIED TYPE: ICD-10-CM

## 2021-09-20 LAB
ALBUMIN SERPL-MCNC: 4 G/DL (ref 3.4–5)
ALBUMIN/GLOB SERPL: 1.1 {RATIO} (ref 1–2)
ALP LIVER SERPL-CCNC: 90 U/L
ALT SERPL-CCNC: 59 U/L
ANION GAP SERPL CALC-SCNC: 5 MMOL/L (ref 0–18)
AST SERPL-CCNC: 26 U/L (ref 15–37)
B-HCG UR QL: NEGATIVE
BASOPHILS # BLD AUTO: 0.05 X10(3) UL (ref 0–0.2)
BASOPHILS NFR BLD AUTO: 0.4 %
BILIRUB SERPL-MCNC: 0.5 MG/DL (ref 0.1–2)
BILIRUB UR QL STRIP.AUTO: NEGATIVE
BUN BLD-MCNC: 10 MG/DL (ref 7–18)
CALCIUM BLD-MCNC: 8.7 MG/DL (ref 8.5–10.1)
CHLORIDE SERPL-SCNC: 105 MMOL/L (ref 98–112)
CLARITY UR REFRACT.AUTO: CLEAR
CO2 SERPL-SCNC: 29 MMOL/L (ref 21–32)
COLOR UR AUTO: YELLOW
CREAT BLD-MCNC: 0.68 MG/DL
EOSINOPHIL # BLD AUTO: 0.2 X10(3) UL (ref 0–0.7)
EOSINOPHIL NFR BLD AUTO: 1.6 %
ERYTHROCYTE [DISTWIDTH] IN BLOOD BY AUTOMATED COUNT: 13.1 %
GLOBULIN PLAS-MCNC: 3.5 G/DL (ref 2.8–4.4)
GLUCOSE BLD-MCNC: 83 MG/DL (ref 70–99)
GLUCOSE UR STRIP.AUTO-MCNC: NEGATIVE MG/DL
HCT VFR BLD AUTO: 44.8 %
HGB BLD-MCNC: 14.2 G/DL
IMM GRANULOCYTES # BLD AUTO: 0.03 X10(3) UL (ref 0–1)
IMM GRANULOCYTES NFR BLD: 0.2 %
LEUKOCYTE ESTERASE UR QL STRIP.AUTO: NEGATIVE
LIPASE SERPL-CCNC: 88 U/L (ref 73–393)
LYMPHOCYTES # BLD AUTO: 3.96 X10(3) UL (ref 1–4)
LYMPHOCYTES NFR BLD AUTO: 32.6 %
MCH RBC QN AUTO: 28 PG (ref 26–34)
MCHC RBC AUTO-ENTMCNC: 31.7 G/DL (ref 31–37)
MCV RBC AUTO: 88.2 FL
MONOCYTES # BLD AUTO: 0.85 X10(3) UL (ref 0.1–1)
MONOCYTES NFR BLD AUTO: 7 %
NEUTROPHILS # BLD AUTO: 7.05 X10 (3) UL (ref 1.5–7.7)
NEUTROPHILS # BLD AUTO: 7.05 X10(3) UL (ref 1.5–7.7)
NEUTROPHILS NFR BLD AUTO: 58.2 %
NITRITE UR QL STRIP.AUTO: NEGATIVE
OSMOLALITY SERPL CALC.SUM OF ELEC: 286 MOSM/KG (ref 275–295)
PH UR STRIP.AUTO: 7.5 [PH] (ref 5–8)
PLATELET # BLD AUTO: 395 10(3)UL (ref 150–450)
POTASSIUM SERPL-SCNC: 3.5 MMOL/L (ref 3.5–5.1)
PROT SERPL-MCNC: 7.5 G/DL (ref 6.4–8.2)
PROT UR STRIP.AUTO-MCNC: NEGATIVE MG/DL
RBC # BLD AUTO: 5.08 X10(6)UL
RBC UR QL AUTO: NEGATIVE
SODIUM SERPL-SCNC: 139 MMOL/L (ref 136–145)
SP GR UR STRIP.AUTO: 1.02 (ref 1–1.03)
UROBILINOGEN UR STRIP.AUTO-MCNC: 1 MG/DL
WBC # BLD AUTO: 12.1 X10(3) UL (ref 4–11)

## 2021-09-20 PROCEDURE — 85025 COMPLETE CBC W/AUTO DIFF WBC: CPT | Performed by: EMERGENCY MEDICINE

## 2021-09-20 PROCEDURE — 99213 OFFICE O/P EST LOW 20 MIN: CPT

## 2021-09-20 PROCEDURE — 96360 HYDRATION IV INFUSION INIT: CPT

## 2021-09-20 PROCEDURE — 99284 EMERGENCY DEPT VISIT MOD MDM: CPT

## 2021-09-20 PROCEDURE — 83690 ASSAY OF LIPASE: CPT | Performed by: EMERGENCY MEDICINE

## 2021-09-20 PROCEDURE — 81025 URINE PREGNANCY TEST: CPT

## 2021-09-20 PROCEDURE — 80053 COMPREHEN METABOLIC PANEL: CPT | Performed by: EMERGENCY MEDICINE

## 2021-09-20 PROCEDURE — 74177 CT ABD & PELVIS W/CONTRAST: CPT | Performed by: EMERGENCY MEDICINE

## 2021-09-20 PROCEDURE — 81003 URINALYSIS AUTO W/O SCOPE: CPT | Performed by: EMERGENCY MEDICINE

## 2021-09-21 ENCOUNTER — TELEPHONE (OUTPATIENT)
Dept: OBGYN CLINIC | Facility: CLINIC | Age: 46
End: 2021-09-21

## 2021-09-21 DIAGNOSIS — R10.2 PELVIC PAIN: Primary | ICD-10-CM

## 2021-09-21 NOTE — ED INITIAL ASSESSMENT (HPI)
Patient reports lower abdominal cramping since Saturday. Admits to some loose stools. Denies nausea/vomiting. Denies vaginal bleeding. Reports feeling bloated, decreased appetite. Denies fever. States pain comes in waves and moves around.  Reports she stopp

## 2021-09-21 NOTE — TELEPHONE ENCOUNTER
Pt just called because she wants to ask you if it could be possible that going off the continuous pill for 12 years, she stopped 3 weeks ago and she is experiencing on and off cramps, she is not bleeding, but kind like ovulation cramps.  The cramps are so b

## 2021-09-21 NOTE — ED PROVIDER NOTES
Patient Seen in: [facility]      History   No chief complaint on file.     Stated Complaint: Abdominal pain 2 days    Subjective:   HPI    Patient is a pleasant 42-year-old female, presenting for evaluation of pain across her lower abdomen Temp src Temporal   SpO2 99 %   O2 Device None (Room air)       Current:BP (!) 149/91   Pulse 94   Temp 97.4 °F (36.3 °C) (Temporal)   Resp 16   Ht 165.1 cm (5' 5\")   Wt 83.9 kg   LMP 08/23/2021   SpO2 99%   BMI 30.79 kg/m²       Physical Exam    Vital

## 2021-09-21 NOTE — TELEPHONE ENCOUNTER
Spoke with patient, stated she is not having any bleeding, but has on/off cramping that is 3-7/10, comes in waves every few hours. It's constantly around a 3/10. She was seen in the ED 9/21/21, CT was done.  Was advised to follow up with GI, but she feels l

## 2021-09-21 NOTE — ED PROVIDER NOTES
Patient Seen in: THE [facility] In [place]      History   Patient presents with:  Abdominal Pain    Stated Complaint: Sent from [facility] for abd pain    Subjective:   HPI    31-year-old female presents to the emergency department from [facility] used    Alcohol use: No    Drug use: No             Review of Systems    Positive for stated complaint: Sent from [facility] for abd pain  Other systems are as noted in HPI. Constitutional and vital signs reviewed.       All other systems reviewed and negative e Abnormality         Status                     ---------                               -----------         ------                     CBC W/ DIFFERENTIAL[[id number]]          Abnormal            Final result                 Please view appendix. No ascites. ABDOMINAL WALL:  Minimal fat containing umbilical hernia. Small fat containing right inguinal hernia. URINARY BLADDER:  Nondistended PELVIC NODES:  None enlarged. PELVIC ORGANS:  Mildly prominent left parauterine veins.   Left ovaria

## 2021-09-22 ENCOUNTER — LAB ENCOUNTER (OUTPATIENT)
Dept: LAB | Age: 46
End: 2021-09-22
Attending: EMERGENCY MEDICINE
Payer: COMMERCIAL

## 2021-09-22 DIAGNOSIS — R19.5 LOOSE STOOLS: ICD-10-CM

## 2021-09-22 DIAGNOSIS — R19.7 DIARRHEA, UNSPECIFIED TYPE: ICD-10-CM

## 2021-09-22 PROCEDURE — 87427 SHIGA-LIKE TOXIN AG IA: CPT

## 2021-09-22 PROCEDURE — 87077 CULTURE AEROBIC IDENTIFY: CPT

## 2021-09-22 PROCEDURE — 87045 FECES CULTURE AEROBIC BACT: CPT

## 2021-09-22 PROCEDURE — 87046 STOOL CULTR AEROBIC BACT EA: CPT

## 2021-09-22 PROCEDURE — 82272 OCCULT BLD FECES 1-3 TESTS: CPT

## 2021-09-22 PROCEDURE — 87493 C DIFF AMPLIFIED PROBE: CPT

## 2021-09-22 NOTE — TELEPHONE ENCOUNTER
Spoke with patient advised that Dr. Katelin Guerra suggested pelvic ultrasound to look at the uterus better to see where the pain may be coming from. Patient scheduled for pelvic US on 9/23/21. Patient verbalized understanding.  Order placed and routed to provide

## 2021-09-23 ENCOUNTER — ULTRASOUND ENCOUNTER (OUTPATIENT)
Dept: OBGYN CLINIC | Facility: CLINIC | Age: 46
End: 2021-09-23
Payer: COMMERCIAL

## 2021-09-23 DIAGNOSIS — R10.2 PELVIC PAIN: Primary | ICD-10-CM

## 2021-09-23 LAB — C DIFF TOX B STL QL: NEGATIVE

## 2021-09-23 PROCEDURE — 76856 US EXAM PELVIC COMPLETE: CPT | Performed by: STUDENT IN AN ORGANIZED HEALTH CARE EDUCATION/TRAINING PROGRAM

## 2021-09-23 PROCEDURE — 76830 TRANSVAGINAL US NON-OB: CPT | Performed by: STUDENT IN AN ORGANIZED HEALTH CARE EDUCATION/TRAINING PROGRAM

## 2021-10-21 ENCOUNTER — LAB ENCOUNTER (OUTPATIENT)
Dept: LAB | Age: 46
End: 2021-10-21
Attending: FAMILY MEDICINE
Payer: COMMERCIAL

## 2021-10-21 ENCOUNTER — OFFICE VISIT (OUTPATIENT)
Dept: FAMILY MEDICINE CLINIC | Facility: CLINIC | Age: 46
End: 2021-10-21
Payer: COMMERCIAL

## 2021-10-21 VITALS
SYSTOLIC BLOOD PRESSURE: 114 MMHG | BODY MASS INDEX: 30.66 KG/M2 | RESPIRATION RATE: 12 BRPM | TEMPERATURE: 99 F | HEART RATE: 80 BPM | WEIGHT: 184 LBS | HEIGHT: 65 IN | DIASTOLIC BLOOD PRESSURE: 74 MMHG

## 2021-10-21 DIAGNOSIS — E55.9 VITAMIN D DEFICIENCY: ICD-10-CM

## 2021-10-21 DIAGNOSIS — L65.9 HAIR LOSS: ICD-10-CM

## 2021-10-21 DIAGNOSIS — R23.8 SCALP IRRITATION: ICD-10-CM

## 2021-10-21 DIAGNOSIS — L65.9 HAIR LOSS: Primary | ICD-10-CM

## 2021-10-21 DIAGNOSIS — L98.9 SKIN LESION OF FACE: ICD-10-CM

## 2021-10-21 PROCEDURE — 3008F BODY MASS INDEX DOCD: CPT | Performed by: FAMILY MEDICINE

## 2021-10-21 PROCEDURE — 85025 COMPLETE CBC W/AUTO DIFF WBC: CPT

## 2021-10-21 PROCEDURE — 84443 ASSAY THYROID STIM HORMONE: CPT

## 2021-10-21 PROCEDURE — 84439 ASSAY OF FREE THYROXINE: CPT

## 2021-10-21 PROCEDURE — 99214 OFFICE O/P EST MOD 30 MIN: CPT | Performed by: FAMILY MEDICINE

## 2021-10-21 PROCEDURE — 3078F DIAST BP <80 MM HG: CPT | Performed by: FAMILY MEDICINE

## 2021-10-21 PROCEDURE — 82728 ASSAY OF FERRITIN: CPT

## 2021-10-21 PROCEDURE — 82306 VITAMIN D 25 HYDROXY: CPT

## 2021-10-21 PROCEDURE — 3074F SYST BP LT 130 MM HG: CPT | Performed by: FAMILY MEDICINE

## 2021-10-21 NOTE — PROGRESS NOTES
Edith Arshad is a 40year old female. CHIEF COMPLAINT   Hair thinning  HPI:   Patient c/o hair loss for the last couple of months more significant than normal.  Scalp also easily irritated. Avelar with applying color to hair.     mentioned th distress  SKIN: Left side of forehead with mildly red patch with slightly dry texture. Also left side of upper forehead with approximately 4 mm flesh colored papule. No scalp dermatitis noted. SCALP:  No patchy hair loss noted.     NECK: supple,no adenopa

## 2021-10-22 RX ORDER — FERROUS SULFATE 137(45) MG
TABLET, EXTENDED RELEASE ORAL
Refills: 0 | COMMUNITY
Start: 2021-10-22

## 2021-11-01 RX ORDER — LEVONORGESTREL / ETHINYL ESTRADIOL AND ETHINYL ESTRADIOL 150-30(84)
KIT ORAL
Qty: 91 TABLET | Refills: 0 | Status: SHIPPED | OUTPATIENT
Start: 2021-11-01 | End: 2022-05-03

## 2022-01-27 ENCOUNTER — OFFICE VISIT (OUTPATIENT)
Dept: OTOLARYNGOLOGY | Facility: CLINIC | Age: 47
End: 2022-01-27
Payer: COMMERCIAL

## 2022-01-27 VITALS — BODY MASS INDEX: 30.82 KG/M2 | HEIGHT: 65 IN | WEIGHT: 185 LBS

## 2022-01-27 DIAGNOSIS — J32.0 CHRONIC MAXILLARY SINUSITIS: Primary | ICD-10-CM

## 2022-01-27 DIAGNOSIS — J32.4 CHRONIC PANSINUSITIS: ICD-10-CM

## 2022-01-27 PROCEDURE — 99213 OFFICE O/P EST LOW 20 MIN: CPT | Performed by: OTOLARYNGOLOGY

## 2022-01-27 PROCEDURE — 3008F BODY MASS INDEX DOCD: CPT | Performed by: OTOLARYNGOLOGY

## 2022-01-27 RX ORDER — MOMETASONE FUROATE 1 MG/G
OINTMENT TOPICAL
COMMUNITY
Start: 2021-11-06

## 2022-01-27 RX ORDER — KETOCONAZOLE 20 MG/ML
SHAMPOO TOPICAL
COMMUNITY
Start: 2021-12-31

## 2022-01-27 NOTE — PROGRESS NOTES
Tracy Christie is a 40year old female.   Patient presents with:  Sinus Problem: patient states she has a foul smell in the nasal cavity ,on and off since her surgery ,SX was on 5/14/21      HISTORY OF PRESENT ILLNESS  4/15/2021  Patient prevents for evalua irrigations.   1/27/2022  Distant bad smell this almost always there coming out of her left nostril a lot of drainage she blows it out feels like the symptoms when she started having this issue with the oral antral fistula she feels that she no longer has a Eyes Negative Blurred vision and vision changes. Respiratory Negative Dyspnea and wheezing. Cardio Negative Chest pain, irregular heartbeat/palpitations and syncope. GI Negative Abdominal pain and diarrhea.    Endocrine Negative Cold intolerance and total) by mouth daily. , Disp: 90 tablet, Rfl: 3  •  Cholecalciferol (VITAMIN D) 25 MCG (1000 UT) Oral Tab, Take 1,000 Units by mouth daily. , Disp: , Rfl:   •  Fluocinonide 0.05 % External Solution, Apply once daily as needed. Max 2 weeks. , Disp: 60 mL,

## 2022-01-28 ENCOUNTER — HOSPITAL ENCOUNTER (OUTPATIENT)
Dept: CT IMAGING | Age: 47
Discharge: HOME | End: 2022-01-28
Attending: OTOLARYNGOLOGY
Payer: COMMERCIAL

## 2022-01-28 ENCOUNTER — TELEPHONE (OUTPATIENT)
Dept: OTOLARYNGOLOGY | Facility: CLINIC | Age: 47
End: 2022-01-28

## 2022-01-28 DIAGNOSIS — J32.4 CHRONIC PANSINUSITIS: ICD-10-CM

## 2022-01-28 DIAGNOSIS — J32.0 CHRONIC MAXILLARY SINUSITIS: ICD-10-CM

## 2022-01-28 PROCEDURE — 70486 CT MAXILLOFACIAL W/O DYE: CPT | Performed by: OTOLARYNGOLOGY

## 2022-01-28 RX ORDER — CIPROFLOXACIN 750 MG/1
TABLET, FILM COATED ORAL
Qty: 14 TABLET | Refills: 0 | Status: SHIPPED | OUTPATIENT
Start: 2022-01-28

## 2022-01-29 ENCOUNTER — TELEPHONE (OUTPATIENT)
Dept: OTOLARYNGOLOGY | Facility: CLINIC | Age: 47
End: 2022-01-29

## 2022-01-29 NOTE — TELEPHONE ENCOUNTER
Per pt needing to clarify instructions for rx.  Wanting to know how much she is to mix, OK to leave detailed VM, please advise

## 2022-02-02 RX ORDER — LEVONORGESTREL / ETHINYL ESTRADIOL AND ETHINYL ESTRADIOL 150-30(84)
KIT ORAL
Qty: 91 TABLET | Refills: 0 | OUTPATIENT
Start: 2022-02-02

## 2022-02-09 RX ORDER — LISINOPRIL 10 MG/1
TABLET ORAL
Qty: 90 TABLET | Refills: 3 | OUTPATIENT
Start: 2022-02-09

## 2022-02-18 ENCOUNTER — PATIENT MESSAGE (OUTPATIENT)
Dept: FAMILY MEDICINE CLINIC | Facility: CLINIC | Age: 47
End: 2022-02-18

## 2022-02-18 NOTE — TELEPHONE ENCOUNTER
From: Antonio Luke  To: Pepe Speaker, MERE  Sent: 2/18/2022 9:14 AM CST  Subject: Ferritin recheck    Good morning, along with a ferritin recheck can I also have a TSH-T4 test again since I was taking biotin at the time and I had a somewhat borderline result?  Thank you, Buren Sever

## 2022-03-04 ENCOUNTER — OFFICE VISIT (OUTPATIENT)
Dept: OTOLARYNGOLOGY | Facility: CLINIC | Age: 47
End: 2022-03-04
Payer: COMMERCIAL

## 2022-03-04 VITALS — HEIGHT: 65 IN | WEIGHT: 185 LBS | BODY MASS INDEX: 30.82 KG/M2

## 2022-03-04 DIAGNOSIS — J32.0 CHRONIC MAXILLARY SINUSITIS: Primary | ICD-10-CM

## 2022-03-04 PROCEDURE — 3008F BODY MASS INDEX DOCD: CPT | Performed by: OTOLARYNGOLOGY

## 2022-03-04 PROCEDURE — 99213 OFFICE O/P EST LOW 20 MIN: CPT | Performed by: OTOLARYNGOLOGY

## 2022-03-07 ENCOUNTER — LAB ENCOUNTER (OUTPATIENT)
Dept: LAB | Age: 47
End: 2022-03-07
Attending: FAMILY MEDICINE
Payer: COMMERCIAL

## 2022-03-07 DIAGNOSIS — L65.9 HAIR LOSS: ICD-10-CM

## 2022-03-07 DIAGNOSIS — E61.1 LOW SERUM IRON: ICD-10-CM

## 2022-03-07 LAB
DEPRECATED HBV CORE AB SER IA-ACNC: 31.9 NG/ML
T4 FREE SERPL-MCNC: 0.9 NG/DL (ref 0.8–1.7)
TSI SER-ACNC: 1.3 MIU/ML (ref 0.36–3.74)

## 2022-03-07 PROCEDURE — 82728 ASSAY OF FERRITIN: CPT

## 2022-03-07 PROCEDURE — 84439 ASSAY OF FREE THYROXINE: CPT

## 2022-03-07 PROCEDURE — 84443 ASSAY THYROID STIM HORMONE: CPT

## 2022-03-14 RX ORDER — LISINOPRIL 10 MG/1
TABLET ORAL
Qty: 90 TABLET | Refills: 3 | OUTPATIENT
Start: 2022-03-14

## 2022-03-15 ENCOUNTER — HOSPITAL ENCOUNTER (OUTPATIENT)
Dept: GENERAL RADIOLOGY | Age: 47
Discharge: HOME | End: 2022-03-15
Attending: FAMILY MEDICINE
Payer: COMMERCIAL

## 2022-03-15 ENCOUNTER — OFFICE VISIT (OUTPATIENT)
Dept: FAMILY MEDICINE CLINIC | Facility: CLINIC | Age: 47
End: 2022-03-15
Payer: COMMERCIAL

## 2022-03-15 VITALS
SYSTOLIC BLOOD PRESSURE: 126 MMHG | HEART RATE: 76 BPM | TEMPERATURE: 98 F | WEIGHT: 190 LBS | HEIGHT: 65 IN | RESPIRATION RATE: 16 BRPM | DIASTOLIC BLOOD PRESSURE: 80 MMHG | BODY MASS INDEX: 31.65 KG/M2

## 2022-03-15 DIAGNOSIS — M79.641 PAIN OF RIGHT HAND: ICD-10-CM

## 2022-03-15 DIAGNOSIS — M79.642 PAIN OF LEFT HAND: Primary | ICD-10-CM

## 2022-03-15 DIAGNOSIS — Z12.31 ENCOUNTER FOR SCREENING MAMMOGRAM FOR MALIGNANT NEOPLASM OF BREAST: ICD-10-CM

## 2022-03-15 DIAGNOSIS — M79.642 PAIN OF LEFT HAND: ICD-10-CM

## 2022-03-15 PROCEDURE — 99213 OFFICE O/P EST LOW 20 MIN: CPT | Performed by: FAMILY MEDICINE

## 2022-03-15 PROCEDURE — 3074F SYST BP LT 130 MM HG: CPT | Performed by: FAMILY MEDICINE

## 2022-03-15 PROCEDURE — 3079F DIAST BP 80-89 MM HG: CPT | Performed by: FAMILY MEDICINE

## 2022-03-15 PROCEDURE — 3008F BODY MASS INDEX DOCD: CPT | Performed by: FAMILY MEDICINE

## 2022-03-15 PROCEDURE — 73130 X-RAY EXAM OF HAND: CPT | Performed by: FAMILY MEDICINE

## 2022-03-17 ENCOUNTER — PATIENT MESSAGE (OUTPATIENT)
Dept: OTOLARYNGOLOGY | Facility: CLINIC | Age: 47
End: 2022-03-17

## 2022-03-17 NOTE — TELEPHONE ENCOUNTER
From: Sarbjit Bach  To: Roselyn Ferguson MD  Sent: 3/17/2022 11:35 AM CDT  Subject: Referral    Hi, Dr. Brandon Santos  I have an appointment with Dr. María Barber on Wed, March 23. Not sure if he has access to [employer].  Is there anything I should bring or anything you want to inform him of or send over to him prior to my appointment?  ~Bethany

## 2022-03-22 RX ORDER — LISINOPRIL 10 MG/1
10 TABLET ORAL DAILY
Qty: 30 TABLET | Refills: 0 | Status: SHIPPED | OUTPATIENT
Start: 2022-03-22 | End: 2022-03-22

## 2022-03-22 RX ORDER — LISINOPRIL 10 MG/1
10 TABLET ORAL DAILY
Qty: 90 TABLET | Refills: 0 | Status: SHIPPED | OUTPATIENT
Start: 2022-03-22

## 2022-03-22 NOTE — TELEPHONE ENCOUNTER
Pt called and stated that she wants a 90 day refill request for    lisinopril 10 MG Oral Tab    But was only given a 30 Day.

## 2022-03-22 NOTE — TELEPHONE ENCOUNTER
Pt requesting refill of:    lisinopril 10 MG Oral Tab    Be sent to:    [id number], [phone]

## 2022-03-22 NOTE — TELEPHONE ENCOUNTER
Pt has an appt on 04/07/2022. She was given # 30 for her Lisinopril. She is requesting a 90 day supply. If appropriate please authorize refill.

## 2022-03-22 NOTE — TELEPHONE ENCOUNTER
Hypertension Medications Protocol Passed 03/22/2022 11:24 AM   Protocol Details  CMP or BMP in past 12 months    Last serum creatinine< 2.0    Appointment in past 6 or next 3 months     LOV 3/15/22 s.d     LAST LAB  9/20/21    LAST RX   12/15/2020 90 with 3 given by [address]   Future Appointments   Date Time Provider Carmelo Rios   5/27/2022  1:20 PM [facility] [place]         PROTOCOL PASS

## 2022-04-07 ENCOUNTER — OFFICE VISIT (OUTPATIENT)
Dept: FAMILY MEDICINE CLINIC | Facility: CLINIC | Age: 47
End: 2022-04-07
Payer: COMMERCIAL

## 2022-04-07 VITALS
WEIGHT: 189.63 LBS | TEMPERATURE: 98 F | RESPIRATION RATE: 18 BRPM | SYSTOLIC BLOOD PRESSURE: 100 MMHG | BODY MASS INDEX: 35.8 KG/M2 | HEART RATE: 64 BPM | DIASTOLIC BLOOD PRESSURE: 80 MMHG | HEIGHT: 61 IN

## 2022-04-07 DIAGNOSIS — I10 BENIGN ESSENTIAL HYPERTENSION: Primary | ICD-10-CM

## 2022-04-07 DIAGNOSIS — R74.01 ELEVATED ALT MEASUREMENT: ICD-10-CM

## 2022-04-07 PROCEDURE — 3008F BODY MASS INDEX DOCD: CPT | Performed by: FAMILY MEDICINE

## 2022-04-07 PROCEDURE — 99213 OFFICE O/P EST LOW 20 MIN: CPT | Performed by: FAMILY MEDICINE

## 2022-04-07 PROCEDURE — 3074F SYST BP LT 130 MM HG: CPT | Performed by: FAMILY MEDICINE

## 2022-04-07 PROCEDURE — 3079F DIAST BP 80-89 MM HG: CPT | Performed by: FAMILY MEDICINE

## 2022-04-07 RX ORDER — LISINOPRIL 10 MG/1
10 TABLET ORAL DAILY
Qty: 90 TABLET | Refills: 1 | Status: SHIPPED | OUTPATIENT
Start: 2022-04-07

## 2022-05-03 ENCOUNTER — TELEPHONE (OUTPATIENT)
Dept: INTERNAL MEDICINE CLINIC | Facility: HOSPITAL | Age: 47
End: 2022-05-03

## 2022-05-03 RX ORDER — LEVONORGESTREL / ETHINYL ESTRADIOL AND ETHINYL ESTRADIOL 150-30(84)
1 KIT ORAL DAILY
Qty: 91 TABLET | Refills: 0 | Status: SHIPPED | OUTPATIENT
Start: 2022-05-03

## 2022-05-03 NOTE — TELEPHONE ENCOUNTER
LOV: 4/7/22 (med check)    Last Refill: 11/1/21, #91, 0 RF (previously refilled by )    Next OV: n/a    Please authorize if acceptable. Thank you!

## 2022-05-04 NOTE — TELEPHONE ENCOUNTER
Results and RTW guidelines:    COVID RESULT:    [x] Viewed by employee in [address] RTW plan and instructions as indicated on triage call. Manager notified. Estimated RTW date: 5/7/22   [x] Discussed with employee   [] Unable to reach by phone. Sent via [employer] message      Test type:    [x] Rapid         [] Alinity         [x] Outside test:          [x] Positive  Is employee unvaccinated? Yes []   No [x]          If yes:  Enter Covid Positive Medical exemption on Exemption Spreadsheet    Did you have close contact with someone on your unit while not wearing a mask? (e.g., during meal breaks):  Yes []   No [x]     If yes, who:     - Employee should quarantine at home for at least 5 days (day 1 is day after sx onset) , follow the CDC guidelines for cleaning and                              quarantining; see CDC.gov   -This employee may RTW on day 6 if asymptomatic or mildly symptomatic (with improving symptoms). Call Employee Health on day 5 if unable to return on day 6 after                      symptom onset.    -This employee needs to call Employee Health on day 5 after symptom onset. The employee needs to be cleared by [employer]. - Monitor symptoms and temperature                 - Notify PCP of result                 - Seek emergent care with worsening symptoms   - If employee is still experiencing severe symptoms on day 5 must make a RTW appt with [facility], Employee will not be cleared if:    1. Has consistent cough, shortness of breath or fatigue that restricts your physical activities    2. Is still feeling \"unwell\"    3. Within 15 days of hospitalization for COVID    4. Within 20 days of intubation for COVID    5.  Still has a fever, vomiting or diarrhea   - Keep communication open with management about RTW and if symptoms worsen                - If outside testing completed, bring a copy of result to RTW appointment           Notes:     RTW PLAN:    [x]  If COVID positive results, off work minimum of 5 days from positive test or onset of symptoms (day 0)        On day 5, if asymptomatic or mildly symptomatic (with improving symptoms) may return to work day 6          On day 5, if symptomatic, call Employee Health for RTW screening        []  COVID positive result - call Employee Health on day 5 after symptom onset. The employee needs to be cleared by Employee Health to RTW. [] RTW immediately, continue to monitor for sx  [] RTW when sx improve; must be fever free for 24 hours w/o medications, Diarrhea/Vomiting free for 24 hours w/o medications  [] Alinity ordered; continue to monitor sx and call for new/worsening sx.   Discuss RTW guidelines with manager  [] May continue to work  [] Follow up with PCP  [] Home until further instruction from hotline with Alinity results  INSTRUCTIONS PROVIDED:  [x]  Plan as noted above  []  Length of time to obtain results   [x]  Quarantine instructions  [x]  Masking protocol   [x]  S/S of worsening infection/condition and importance of prompt medical re-evaluation including when to seek emergency care  [] If symptoms develop, stay home and call hotline for rapid test order    Estimated RTW date:  5/7/22    [x] The employee voiced understanding of above plan/instructions  [x] Manager Notified

## 2022-05-27 ENCOUNTER — HOSPITAL ENCOUNTER (OUTPATIENT)
Dept: MAMMOGRAPHY | Age: 47
Discharge: HOME | End: 2022-05-27
Attending: FAMILY MEDICINE
Payer: COMMERCIAL

## 2022-05-27 DIAGNOSIS — Z12.31 ENCOUNTER FOR SCREENING MAMMOGRAM FOR MALIGNANT NEOPLASM OF BREAST: ICD-10-CM

## 2022-05-27 PROCEDURE — 77067 SCR MAMMO BI INCL CAD: CPT | Performed by: FAMILY MEDICINE

## 2022-05-27 PROCEDURE — 77063 BREAST TOMOSYNTHESIS BI: CPT | Performed by: FAMILY MEDICINE

## 2022-06-10 ENCOUNTER — TELEPHONE (OUTPATIENT)
Dept: FAMILY MEDICINE CLINIC | Facility: CLINIC | Age: 47
End: 2022-06-10

## 2022-06-10 NOTE — TELEPHONE ENCOUNTER
Medical Records Request received from [facility] for records from 1/1/2017-present. Release original sent to [employer] on 6/10/2022. Copy sent to scanning.

## 2022-07-19 RX ORDER — LEVONORGESTREL / ETHINYL ESTRADIOL AND ETHINYL ESTRADIOL 150-30(84)
KIT ORAL
Qty: 91 TABLET | Refills: 0 | Status: SHIPPED | OUTPATIENT
Start: 2022-07-19

## 2022-10-11 DIAGNOSIS — I10 BENIGN ESSENTIAL HYPERTENSION: Primary | ICD-10-CM

## 2022-10-11 RX ORDER — LEVONORGESTREL / ETHINYL ESTRADIOL AND ETHINYL ESTRADIOL 150-30(84)
KIT ORAL
Qty: 91 TABLET | Refills: 0 | Status: SHIPPED | OUTPATIENT
Start: 2022-10-11

## 2022-10-12 RX ORDER — LISINOPRIL 10 MG/1
10 TABLET ORAL DAILY
Qty: 30 TABLET | Refills: 0 | Status: SHIPPED | OUTPATIENT
Start: 2022-10-12

## 2022-10-20 ENCOUNTER — IMMUNIZATION (OUTPATIENT)
Dept: LAB | Facility: HOSPITAL | Age: 47
End: 2022-10-20
Attending: PREVENTIVE MEDICINE
Payer: COMMERCIAL

## 2022-10-20 DIAGNOSIS — Z23 NEED FOR VACCINATION: Primary | ICD-10-CM

## 2022-10-20 PROCEDURE — 90471 IMMUNIZATION ADMIN: CPT

## 2022-11-11 ENCOUNTER — OFFICE VISIT (OUTPATIENT)
Dept: FAMILY MEDICINE CLINIC | Facility: CLINIC | Age: 47
End: 2022-11-11
Payer: COMMERCIAL

## 2022-11-11 VITALS
TEMPERATURE: 98 F | RESPIRATION RATE: 16 BRPM | OXYGEN SATURATION: 99 % | HEIGHT: 65 IN | DIASTOLIC BLOOD PRESSURE: 88 MMHG | SYSTOLIC BLOOD PRESSURE: 137 MMHG | HEART RATE: 93 BPM | WEIGHT: 190 LBS | BODY MASS INDEX: 31.65 KG/M2

## 2022-11-11 DIAGNOSIS — J06.9 VIRAL URI WITH COUGH: Primary | ICD-10-CM

## 2022-11-11 PROCEDURE — 3008F BODY MASS INDEX DOCD: CPT

## 2022-11-11 PROCEDURE — 3079F DIAST BP 80-89 MM HG: CPT

## 2022-11-11 PROCEDURE — 3075F SYST BP GE 130 - 139MM HG: CPT

## 2022-11-11 PROCEDURE — 99213 OFFICE O/P EST LOW 20 MIN: CPT

## 2022-11-11 PROCEDURE — 87637 SARSCOV2&INF A&B&RSV AMP PRB: CPT

## 2022-11-12 LAB
FLUAV + FLUBV RNA SPEC NAA+PROBE: NOT DETECTED
FLUAV + FLUBV RNA SPEC NAA+PROBE: NOT DETECTED
RSV RNA SPEC NAA+PROBE: DETECTED
SARS-COV-2 RNA RESP QL NAA+PROBE: NOT DETECTED

## 2022-11-16 ENCOUNTER — OFFICE VISIT (OUTPATIENT)
Dept: FAMILY MEDICINE CLINIC | Facility: CLINIC | Age: 47
End: 2022-11-16
Payer: COMMERCIAL

## 2022-11-16 VITALS
RESPIRATION RATE: 16 BRPM | HEIGHT: 65 IN | BODY MASS INDEX: 32.26 KG/M2 | WEIGHT: 193.63 LBS | SYSTOLIC BLOOD PRESSURE: 104 MMHG | TEMPERATURE: 99 F | HEART RATE: 80 BPM | DIASTOLIC BLOOD PRESSURE: 74 MMHG

## 2022-11-16 DIAGNOSIS — Z12.11 ENCOUNTER FOR COLORECTAL CANCER SCREENING: ICD-10-CM

## 2022-11-16 DIAGNOSIS — Z00.00 LABORATORY EXAMINATION ORDERED AS PART OF A ROUTINE GENERAL MEDICAL EXAMINATION: ICD-10-CM

## 2022-11-16 DIAGNOSIS — Z13.89 SCREENING FOR GENITOURINARY CONDITION: ICD-10-CM

## 2022-11-16 DIAGNOSIS — F43.0 STRESS REACTION: ICD-10-CM

## 2022-11-16 DIAGNOSIS — Z00.00 ROUTINE GENERAL MEDICAL EXAMINATION AT A HEALTH CARE FACILITY: Primary | ICD-10-CM

## 2022-11-16 DIAGNOSIS — L65.9 HAIR THINNING: ICD-10-CM

## 2022-11-16 DIAGNOSIS — Z12.12 ENCOUNTER FOR COLORECTAL CANCER SCREENING: ICD-10-CM

## 2022-11-16 DIAGNOSIS — Z86.39 HISTORY OF VITAMIN D DEFICIENCY: ICD-10-CM

## 2022-11-16 PROBLEM — B35.4 TINEA CORPORIS: Status: RESOLVED | Noted: 2019-08-15 | Resolved: 2022-11-16

## 2022-11-16 PROCEDURE — 3008F BODY MASS INDEX DOCD: CPT | Performed by: FAMILY MEDICINE

## 2022-11-16 PROCEDURE — 99213 OFFICE O/P EST LOW 20 MIN: CPT | Performed by: FAMILY MEDICINE

## 2022-11-16 PROCEDURE — 3078F DIAST BP <80 MM HG: CPT | Performed by: FAMILY MEDICINE

## 2022-11-16 PROCEDURE — 99396 PREV VISIT EST AGE 40-64: CPT | Performed by: FAMILY MEDICINE

## 2022-11-16 PROCEDURE — 3074F SYST BP LT 130 MM HG: CPT | Performed by: FAMILY MEDICINE

## 2022-11-16 RX ORDER — AZITHROMYCIN 250 MG/1
TABLET, FILM COATED ORAL
Qty: 6 TABLET | Refills: 0 | Status: SHIPPED | OUTPATIENT
Start: 2022-11-16 | End: 2022-11-21

## 2022-11-16 RX ORDER — FLUOXETINE 10 MG/1
10 CAPSULE ORAL DAILY
Qty: 30 CAPSULE | Refills: 1 | Status: SHIPPED | OUTPATIENT
Start: 2022-11-16

## 2022-11-18 ENCOUNTER — LAB ENCOUNTER (OUTPATIENT)
Dept: LAB | Age: 47
End: 2022-11-18
Attending: FAMILY MEDICINE
Payer: COMMERCIAL

## 2022-11-18 ENCOUNTER — PATIENT MESSAGE (OUTPATIENT)
Dept: FAMILY MEDICINE CLINIC | Facility: CLINIC | Age: 47
End: 2022-11-18

## 2022-11-18 DIAGNOSIS — Z13.89 SCREENING FOR GENITOURINARY CONDITION: ICD-10-CM

## 2022-11-18 DIAGNOSIS — Z86.39 HISTORY OF VITAMIN D DEFICIENCY: ICD-10-CM

## 2022-11-18 DIAGNOSIS — Z00.00 LABORATORY EXAMINATION ORDERED AS PART OF A ROUTINE GENERAL MEDICAL EXAMINATION: ICD-10-CM

## 2022-11-18 DIAGNOSIS — L65.9 HAIR THINNING: ICD-10-CM

## 2022-11-18 LAB
ALBUMIN SERPL-MCNC: 3.8 G/DL (ref 3.4–5)
ALBUMIN/GLOB SERPL: 1.3 {RATIO} (ref 1–2)
ALP LIVER SERPL-CCNC: 71 U/L
ALT SERPL-CCNC: 32 U/L
ANION GAP SERPL CALC-SCNC: 6 MMOL/L (ref 0–18)
AST SERPL-CCNC: 17 U/L (ref 15–37)
BASOPHILS # BLD AUTO: 0.05 X10(3) UL (ref 0–0.2)
BASOPHILS NFR BLD AUTO: 0.6 %
BILIRUB SERPL-MCNC: 0.7 MG/DL (ref 0.1–2)
BILIRUB UR QL: NEGATIVE
BUN BLD-MCNC: 12 MG/DL (ref 7–18)
BUN/CREAT SERPL: 20.7 (ref 10–20)
CALCIUM BLD-MCNC: 9.2 MG/DL (ref 8.5–10.1)
CHLORIDE SERPL-SCNC: 108 MMOL/L (ref 98–112)
CHOLEST SERPL-MCNC: 174 MG/DL (ref ?–200)
CLARITY UR: CLEAR
CO2 SERPL-SCNC: 25 MMOL/L (ref 21–32)
COLOR UR: YELLOW
CREAT BLD-MCNC: 0.58 MG/DL
DEPRECATED HBV CORE AB SER IA-ACNC: 62.6 NG/ML
DEPRECATED RDW RBC AUTO: 41.2 FL (ref 35.1–46.3)
EOSINOPHIL # BLD AUTO: 0.21 X10(3) UL (ref 0–0.7)
EOSINOPHIL NFR BLD AUTO: 2.7 %
ERYTHROCYTE [DISTWIDTH] IN BLOOD BY AUTOMATED COUNT: 12.6 % (ref 11–15)
FASTING PATIENT LIPID ANSWER: YES
FASTING STATUS PATIENT QL REPORTED: YES
GFR SERPLBLD BASED ON 1.73 SQ M-ARVRAT: 113 ML/MIN/1.73M2 (ref 60–?)
GLOBULIN PLAS-MCNC: 3 G/DL (ref 2.8–4.4)
GLUCOSE BLD-MCNC: 76 MG/DL (ref 70–99)
GLUCOSE UR-MCNC: NEGATIVE MG/DL
HCT VFR BLD AUTO: 42.6 %
HDLC SERPL-MCNC: 44 MG/DL (ref 40–59)
HGB BLD-MCNC: 13.8 G/DL
IMM GRANULOCYTES # BLD AUTO: 0.03 X10(3) UL (ref 0–1)
IMM GRANULOCYTES NFR BLD: 0.4 %
KETONES UR-MCNC: NEGATIVE MG/DL
LDLC SERPL CALC-MCNC: 115 MG/DL (ref ?–100)
LEUKOCYTE ESTERASE UR QL STRIP.AUTO: NEGATIVE
LYMPHOCYTES # BLD AUTO: 2.3 X10(3) UL (ref 1–4)
LYMPHOCYTES NFR BLD AUTO: 29.7 %
MCH RBC QN AUTO: 28.8 PG (ref 26–34)
MCHC RBC AUTO-ENTMCNC: 32.4 G/DL (ref 31–37)
MCV RBC AUTO: 88.9 FL
MONOCYTES # BLD AUTO: 0.41 X10(3) UL (ref 0.1–1)
MONOCYTES NFR BLD AUTO: 5.3 %
NEUTROPHILS # BLD AUTO: 4.74 X10 (3) UL (ref 1.5–7.7)
NEUTROPHILS # BLD AUTO: 4.74 X10(3) UL (ref 1.5–7.7)
NEUTROPHILS NFR BLD AUTO: 61.3 %
NITRITE UR QL STRIP.AUTO: NEGATIVE
NONHDLC SERPL-MCNC: 130 MG/DL (ref ?–130)
OSMOLALITY SERPL CALC.SUM OF ELEC: 287 MOSM/KG (ref 275–295)
PH UR: 7 [PH] (ref 5–8)
PLATELET # BLD AUTO: 384 10(3)UL (ref 150–450)
POTASSIUM SERPL-SCNC: 4.4 MMOL/L (ref 3.5–5.1)
PROT SERPL-MCNC: 6.8 G/DL (ref 6.4–8.2)
PROT UR-MCNC: NEGATIVE MG/DL
RBC # BLD AUTO: 4.79 X10(6)UL
SODIUM SERPL-SCNC: 139 MMOL/L (ref 136–145)
SP GR UR STRIP: 1.02 (ref 1–1.03)
TRIGL SERPL-MCNC: 81 MG/DL (ref 30–149)
TSI SER-ACNC: 1.12 MIU/ML (ref 0.36–3.74)
UROBILINOGEN UR STRIP-ACNC: 0.2
VIT D+METAB SERPL-MCNC: 24.1 NG/ML (ref 30–100)
VLDLC SERPL CALC-MCNC: 14 MG/DL (ref 0–30)
WBC # BLD AUTO: 7.7 X10(3) UL (ref 4–11)

## 2022-11-18 PROCEDURE — 81001 URINALYSIS AUTO W/SCOPE: CPT

## 2022-11-18 PROCEDURE — 81015 MICROSCOPIC EXAM OF URINE: CPT

## 2022-11-18 PROCEDURE — 82306 VITAMIN D 25 HYDROXY: CPT

## 2022-11-18 PROCEDURE — 84443 ASSAY THYROID STIM HORMONE: CPT

## 2022-11-18 PROCEDURE — 80053 COMPREHEN METABOLIC PANEL: CPT

## 2022-11-18 PROCEDURE — 80061 LIPID PANEL: CPT

## 2022-11-18 PROCEDURE — 85025 COMPLETE CBC W/AUTO DIFF WBC: CPT

## 2022-11-18 PROCEDURE — 82728 ASSAY OF FERRITIN: CPT

## 2022-11-21 NOTE — TELEPHONE ENCOUNTER
From: Sundra Canada  To: Carmela Prabhakar PA-C  Sent: 11/18/2022 8:53 PM CST  Subject: Employee health form    Hi Ermelinda, I did my blood work this morning. Remember to turn in my employee health form. Thank you!!  Maddison Galvez

## 2022-11-25 DIAGNOSIS — E55.9 VITAMIN D DEFICIENCY: Primary | ICD-10-CM

## 2022-11-28 ENCOUNTER — MED REC SCAN ONLY (OUTPATIENT)
Dept: FAMILY MEDICINE CLINIC | Facility: CLINIC | Age: 47
End: 2022-11-28

## 2022-12-19 ENCOUNTER — OFFICE VISIT (OUTPATIENT)
Dept: SURGERY | Facility: CLINIC | Age: 47
End: 2022-12-19
Payer: COMMERCIAL

## 2022-12-19 VITALS
HEART RATE: 84 BPM | DIASTOLIC BLOOD PRESSURE: 78 MMHG | SYSTOLIC BLOOD PRESSURE: 121 MMHG | BODY MASS INDEX: 32.15 KG/M2 | WEIGHT: 193 LBS | HEIGHT: 65 IN

## 2022-12-19 DIAGNOSIS — R31.29 MICROSCOPIC HEMATURIA: Primary | ICD-10-CM

## 2022-12-28 ENCOUNTER — TELEPHONE (OUTPATIENT)
Dept: SURGERY | Facility: CLINIC | Age: 47
End: 2022-12-28

## 2022-12-28 DIAGNOSIS — R31.29 MICROSCOPIC HEMATURIA: Primary | ICD-10-CM

## 2023-01-03 ENCOUNTER — TELEPHONE (OUTPATIENT)
Facility: CLINIC | Age: 48
End: 2023-01-03

## 2023-01-09 RX ORDER — LEVONORGESTREL / ETHINYL ESTRADIOL AND ETHINYL ESTRADIOL 150-30(84)
KIT ORAL
Qty: 91 TABLET | Refills: 0 | Status: SHIPPED | OUTPATIENT
Start: 2023-01-09

## 2023-02-02 ENCOUNTER — OFFICE VISIT (OUTPATIENT)
Facility: CLINIC | Age: 48
End: 2023-02-02
Payer: COMMERCIAL

## 2023-02-02 VITALS
HEART RATE: 77 BPM | WEIGHT: 192.63 LBS | SYSTOLIC BLOOD PRESSURE: 116 MMHG | DIASTOLIC BLOOD PRESSURE: 72 MMHG | BODY MASS INDEX: 32.1 KG/M2 | HEIGHT: 65 IN

## 2023-02-02 DIAGNOSIS — I10 BENIGN ESSENTIAL HYPERTENSION: ICD-10-CM

## 2023-02-02 DIAGNOSIS — Z12.4 CERVICAL CANCER SCREENING: ICD-10-CM

## 2023-02-02 DIAGNOSIS — Z01.419 ENCOUNTER FOR WELL WOMAN EXAM WITH ROUTINE GYNECOLOGICAL EXAM: Primary | ICD-10-CM

## 2023-02-02 PROCEDURE — 87624 HPV HI-RISK TYP POOLED RSLT: CPT | Performed by: OBSTETRICS & GYNECOLOGY

## 2023-02-02 PROCEDURE — 3074F SYST BP LT 130 MM HG: CPT | Performed by: OBSTETRICS & GYNECOLOGY

## 2023-02-02 PROCEDURE — 99396 PREV VISIT EST AGE 40-64: CPT | Performed by: OBSTETRICS & GYNECOLOGY

## 2023-02-02 PROCEDURE — 88175 CYTOPATH C/V AUTO FLUID REDO: CPT | Performed by: OBSTETRICS & GYNECOLOGY

## 2023-02-02 PROCEDURE — 3008F BODY MASS INDEX DOCD: CPT | Performed by: OBSTETRICS & GYNECOLOGY

## 2023-02-02 PROCEDURE — 3078F DIAST BP <80 MM HG: CPT | Performed by: OBSTETRICS & GYNECOLOGY

## 2023-02-02 RX ORDER — LISINOPRIL 10 MG/1
TABLET ORAL
Qty: 90 TABLET | Refills: 1 | OUTPATIENT
Start: 2023-02-02

## 2023-02-02 RX ORDER — LISINOPRIL 10 MG/1
TABLET ORAL
Qty: 30 TABLET | Refills: 0 | Status: SHIPPED | OUTPATIENT
Start: 2023-02-02

## 2023-02-02 RX ORDER — LEVONORGESTREL / ETHINYL ESTRADIOL AND ETHINYL ESTRADIOL 150-30(84)
1 KIT ORAL DAILY
Qty: 91 TABLET | Refills: 3 | Status: SHIPPED | OUTPATIENT
Start: 2023-02-02

## 2023-02-03 LAB — HPV I/H RISK 1 DNA SPEC QL NAA+PROBE: NEGATIVE

## 2023-02-07 ENCOUNTER — HOSPITAL ENCOUNTER (OUTPATIENT)
Dept: ULTRASOUND IMAGING | Age: 48
Discharge: HOME | End: 2023-02-07
Attending: UROLOGY
Payer: COMMERCIAL

## 2023-02-07 DIAGNOSIS — R31.29 MICROSCOPIC HEMATURIA: ICD-10-CM

## 2023-02-07 PROCEDURE — 76770 US EXAM ABDO BACK WALL COMP: CPT | Performed by: UROLOGY

## 2023-02-20 DIAGNOSIS — I10 BENIGN ESSENTIAL HYPERTENSION: ICD-10-CM

## 2023-02-20 RX ORDER — LISINOPRIL 10 MG/1
10 TABLET ORAL DAILY
Qty: 90 TABLET | Refills: 0 | Status: SHIPPED | OUTPATIENT
Start: 2023-02-20

## 2023-02-20 NOTE — TELEPHONE ENCOUNTER
LOV: 11/16/22  Last Refill: 2/2/23, #30. 0 RF  Next OV: n/a    Please authorize if acceptable. Thank you!

## 2023-02-22 ENCOUNTER — PROCEDURE (OUTPATIENT)
Dept: SURGERY | Facility: CLINIC | Age: 48
End: 2023-02-22

## 2023-02-22 VITALS — HEART RATE: 86 BPM | DIASTOLIC BLOOD PRESSURE: 83 MMHG | SYSTOLIC BLOOD PRESSURE: 126 MMHG

## 2023-02-22 DIAGNOSIS — R31.29 MICROSCOPIC HEMATURIA: Primary | ICD-10-CM

## 2023-02-22 PROCEDURE — 52000 CYSTOURETHROSCOPY: CPT | Performed by: UROLOGY

## 2023-02-22 PROCEDURE — 3074F SYST BP LT 130 MM HG: CPT | Performed by: UROLOGY

## 2023-02-22 PROCEDURE — 99213 OFFICE O/P EST LOW 20 MIN: CPT | Performed by: UROLOGY

## 2023-02-22 PROCEDURE — 3079F DIAST BP 80-89 MM HG: CPT | Performed by: UROLOGY

## 2023-02-22 NOTE — PROCEDURES
CYSTOSCOPY (FEMALE)    PRE-OP DIAGNOSIS: hematuria    POST-OP DIAGNOSIS: same    PROCEDURE: Cystsocopy    SURGEON: Maria Ines Vanegas MD    ASSISTANT: none     EBL: minimal    FINDINGS:   1. Urethra: No urethral lesions, no urethral strictures  2. Bladder: Bilateral ureteral orifices in orthotopic position with efflux, no suspicious or concerning erythematous lesions, no papillary bladder tumors, no stones   3. Retroflexion: no abnormalities   4. Other findings: none    INDICATIONS: microhematuria. RBUS with no hydronephrosis, punctate left kidney stone. Presents for cysto. PROCEDURE: Patient was brought to the procedure suite and a timeout was performed identifying the patient,  and procedure being performed. The risks of the procedure were once again detailed to the patient including bleeding, infection, dysuria. The patient agreed to proceed. The patient had a negative urinalysis. No antibiotics were given to patient prior to this procedure. She was placed in a supine position on the table and a flexible cystoscope was inserted per urethra. There were no obvious urethral lesions or strictures. Once in the bladder we performed a full diagnostic/surveillance cystoscopy which demonstrated no abnormalties. On retroflexion we noted no abnormalities. The scope was then carefully removed and once again no urethral abnormalities were noted. There were no complications after this procedure and the patient tolerated the procedure without issue. IMPRESSION: microhematuria. Cysto negative. RBUS with punctate left stone. Asked her to monitor for any flank pain and discussed stone prevention. Patient was counseled on stone prevention methods including hydration (until urine is clear), limiting salt and red meat/meat intake, eating a normal calcium diet, and drinking lemon with water.  We also talked about reasons to go to the emergency room - namely acute flank pain associated with fevers, chills, nausea or vomiting. PLAN:    1. RTC prn   2.  Follow up with PCP on annual basis

## 2023-03-05 ENCOUNTER — OFFICE VISIT (OUTPATIENT)
Dept: FAMILY MEDICINE CLINIC | Facility: CLINIC | Age: 48
End: 2023-03-05
Payer: COMMERCIAL

## 2023-03-05 VITALS
RESPIRATION RATE: 16 BRPM | HEIGHT: 65 IN | SYSTOLIC BLOOD PRESSURE: 102 MMHG | HEART RATE: 86 BPM | BODY MASS INDEX: 32.49 KG/M2 | DIASTOLIC BLOOD PRESSURE: 64 MMHG | WEIGHT: 195 LBS | TEMPERATURE: 99 F | OXYGEN SATURATION: 98 %

## 2023-03-05 DIAGNOSIS — H91.92 HEARING LOSS OF LEFT EAR, UNSPECIFIED HEARING LOSS TYPE: Primary | ICD-10-CM

## 2023-03-05 PROCEDURE — 99213 OFFICE O/P EST LOW 20 MIN: CPT | Performed by: NURSE PRACTITIONER

## 2023-03-05 PROCEDURE — 3008F BODY MASS INDEX DOCD: CPT | Performed by: NURSE PRACTITIONER

## 2023-03-05 PROCEDURE — 3078F DIAST BP <80 MM HG: CPT | Performed by: NURSE PRACTITIONER

## 2023-03-05 PROCEDURE — 3074F SYST BP LT 130 MM HG: CPT | Performed by: NURSE PRACTITIONER

## 2023-03-05 NOTE — PATIENT INSTRUCTIONS
Start Flonase Nightly per package instruction    Call ENT for appointment Tomorrow- call PCP if need referra.

## 2023-03-06 ENCOUNTER — TELEPHONE (OUTPATIENT)
Dept: FAMILY MEDICINE CLINIC | Facility: CLINIC | Age: 48
End: 2023-03-06

## 2023-03-06 NOTE — TELEPHONE ENCOUNTER
03/06 LMTCB. Please ask for the name of the ENT provider pt is seeing.  She provided an address but I do not see the name of the provider/

## 2023-03-06 NOTE — TELEPHONE ENCOUNTER
Pt called and requested a referral to see her ENT appointment today. Pt requesting referral for the below specialist:    Specialty: ENT   Provider/Specialist:  Address: [address], [address] [place] [address]  Ph. # [phone]    Reason/Symptoms: Cannot hear out of left ear    Has pt seen PCP for this condition? (Y/N): No    Insurance: [employer]      Pt has an appointment with ENT 03/06 at 1pm.

## 2023-03-16 ENCOUNTER — HOSPITAL ENCOUNTER (OUTPATIENT)
Dept: MRI IMAGING | Age: 48
Discharge: HOME | End: 2023-03-16
Attending: OTOLARYNGOLOGY
Payer: COMMERCIAL

## 2023-03-16 DIAGNOSIS — H91.20 SUDDEN-ONSET SENSORINEURAL HEARING LOSS: ICD-10-CM

## 2023-03-16 DIAGNOSIS — H91.90 HEARING DISORDER, UNSPECIFIED LATERALITY: ICD-10-CM

## 2023-03-16 PROCEDURE — 70553 MRI BRAIN STEM W/O & W/DYE: CPT | Performed by: OTOLARYNGOLOGY

## 2023-03-16 PROCEDURE — A9575 INJ GADOTERATE MEGLUMI 0.1ML: HCPCS | Performed by: OTOLARYNGOLOGY

## 2023-03-16 RX ORDER — GADOTERATE MEGLUMINE 376.9 MG/ML
20 INJECTION INTRAVENOUS
Status: COMPLETED | OUTPATIENT
Start: 2023-03-16 | End: 2023-03-16

## 2023-03-16 RX ADMIN — GADOTERATE MEGLUMINE 18 ML: 376.9 INJECTION INTRAVENOUS at 08:48:00

## 2023-03-27 ENCOUNTER — OFFICE VISIT (OUTPATIENT)
Dept: FAMILY MEDICINE CLINIC | Facility: CLINIC | Age: 48
End: 2023-03-27
Payer: COMMERCIAL

## 2023-03-27 VITALS
SYSTOLIC BLOOD PRESSURE: 120 MMHG | DIASTOLIC BLOOD PRESSURE: 80 MMHG | BODY MASS INDEX: 32.49 KG/M2 | TEMPERATURE: 98 F | RESPIRATION RATE: 16 BRPM | WEIGHT: 195 LBS | HEIGHT: 65 IN | OXYGEN SATURATION: 99 % | HEART RATE: 86 BPM

## 2023-03-27 DIAGNOSIS — J01.00 ACUTE NON-RECURRENT MAXILLARY SINUSITIS: Primary | ICD-10-CM

## 2023-03-27 PROCEDURE — 3074F SYST BP LT 130 MM HG: CPT

## 2023-03-27 PROCEDURE — 3079F DIAST BP 80-89 MM HG: CPT

## 2023-03-27 PROCEDURE — 3008F BODY MASS INDEX DOCD: CPT

## 2023-03-27 PROCEDURE — 99213 OFFICE O/P EST LOW 20 MIN: CPT

## 2023-03-27 RX ORDER — DIAZEPAM 5 MG/1
TABLET ORAL
COMMUNITY
Start: 2023-03-15

## 2023-03-27 RX ORDER — DOXYCYCLINE HYCLATE 100 MG
100 TABLET ORAL 2 TIMES DAILY
Qty: 20 TABLET | Refills: 0 | Status: SHIPPED | OUTPATIENT
Start: 2023-03-27 | End: 2023-04-06

## 2023-05-08 ENCOUNTER — ORDER TRANSCRIPTION (OUTPATIENT)
Dept: ADMINISTRATIVE | Facility: HOSPITAL | Age: 48
End: 2023-05-08

## 2023-05-08 DIAGNOSIS — Z12.31 ENCOUNTER FOR SCREENING MAMMOGRAM FOR MALIGNANT NEOPLASM OF BREAST: Primary | ICD-10-CM

## 2023-05-15 DIAGNOSIS — I10 BENIGN ESSENTIAL HYPERTENSION: ICD-10-CM

## 2023-05-15 PROBLEM — D12.3 BENIGN NEOPLASM OF TRANSVERSE COLON: Status: ACTIVE | Noted: 2023-05-15

## 2023-05-15 PROBLEM — Z12.11 SPECIAL SCREENING FOR MALIGNANT NEOPLASM OF COLON: Status: ACTIVE | Noted: 2023-05-15

## 2023-05-17 RX ORDER — LISINOPRIL 10 MG/1
TABLET ORAL
Qty: 90 TABLET | Refills: 0 | Status: SHIPPED | OUTPATIENT
Start: 2023-05-17

## 2023-05-22 ENCOUNTER — OFFICE VISIT (OUTPATIENT)
Dept: FAMILY MEDICINE CLINIC | Facility: CLINIC | Age: 48
End: 2023-05-22
Payer: COMMERCIAL

## 2023-05-22 VITALS
HEART RATE: 71 BPM | SYSTOLIC BLOOD PRESSURE: 122 MMHG | HEIGHT: 65 IN | BODY MASS INDEX: 32.45 KG/M2 | DIASTOLIC BLOOD PRESSURE: 78 MMHG | RESPIRATION RATE: 18 BRPM | WEIGHT: 194.75 LBS

## 2023-05-22 DIAGNOSIS — N89.8 VAGINAL DISCHARGE: ICD-10-CM

## 2023-05-22 DIAGNOSIS — I10 BENIGN ESSENTIAL HYPERTENSION: Primary | ICD-10-CM

## 2023-05-22 PROCEDURE — 3074F SYST BP LT 130 MM HG: CPT | Performed by: STUDENT IN AN ORGANIZED HEALTH CARE EDUCATION/TRAINING PROGRAM

## 2023-05-22 PROCEDURE — 3078F DIAST BP <80 MM HG: CPT | Performed by: STUDENT IN AN ORGANIZED HEALTH CARE EDUCATION/TRAINING PROGRAM

## 2023-05-22 PROCEDURE — 3008F BODY MASS INDEX DOCD: CPT | Performed by: STUDENT IN AN ORGANIZED HEALTH CARE EDUCATION/TRAINING PROGRAM

## 2023-05-22 PROCEDURE — 99214 OFFICE O/P EST MOD 30 MIN: CPT | Performed by: STUDENT IN AN ORGANIZED HEALTH CARE EDUCATION/TRAINING PROGRAM

## 2023-05-22 RX ORDER — FLUCONAZOLE 150 MG/1
150 TABLET ORAL
Qty: 2 TABLET | Refills: 0 | Status: SHIPPED | OUTPATIENT
Start: 2023-05-22 | End: 2023-05-26

## 2023-05-24 ENCOUNTER — HOSPITAL ENCOUNTER (OUTPATIENT)
Dept: CT IMAGING | Facility: HOSPITAL | Age: 48
Discharge: HOME | End: 2023-05-24
Attending: STUDENT IN AN ORGANIZED HEALTH CARE EDUCATION/TRAINING PROGRAM
Payer: COMMERCIAL

## 2023-05-24 ENCOUNTER — OFFICE VISIT (OUTPATIENT)
Dept: OTOLARYNGOLOGY | Facility: CLINIC | Age: 48
End: 2023-05-24

## 2023-05-24 VITALS — BODY MASS INDEX: 32.45 KG/M2 | TEMPERATURE: 98 F | HEIGHT: 65 IN | WEIGHT: 194.75 LBS

## 2023-05-24 DIAGNOSIS — J32.4 CHRONIC PANSINUSITIS: ICD-10-CM

## 2023-05-24 DIAGNOSIS — J32.4 CHRONIC PANSINUSITIS: Primary | ICD-10-CM

## 2023-05-24 PROCEDURE — 3008F BODY MASS INDEX DOCD: CPT | Performed by: STUDENT IN AN ORGANIZED HEALTH CARE EDUCATION/TRAINING PROGRAM

## 2023-05-24 PROCEDURE — 99213 OFFICE O/P EST LOW 20 MIN: CPT | Performed by: STUDENT IN AN ORGANIZED HEALTH CARE EDUCATION/TRAINING PROGRAM

## 2023-05-24 PROCEDURE — 70486 CT MAXILLOFACIAL W/O DYE: CPT | Performed by: STUDENT IN AN ORGANIZED HEALTH CARE EDUCATION/TRAINING PROGRAM

## 2023-05-31 ENCOUNTER — HOSPITAL ENCOUNTER (OUTPATIENT)
Dept: MAMMOGRAPHY | Age: 48
Discharge: HOME | End: 2023-05-31
Attending: FAMILY MEDICINE
Payer: COMMERCIAL

## 2023-05-31 DIAGNOSIS — Z12.31 ENCOUNTER FOR SCREENING MAMMOGRAM FOR MALIGNANT NEOPLASM OF BREAST: ICD-10-CM

## 2023-05-31 PROCEDURE — 77063 BREAST TOMOSYNTHESIS BI: CPT | Performed by: FAMILY MEDICINE

## 2023-05-31 PROCEDURE — 77067 SCR MAMMO BI INCL CAD: CPT | Performed by: FAMILY MEDICINE

## 2023-06-01 ENCOUNTER — TELEPHONE (OUTPATIENT)
Dept: OTOLARYNGOLOGY | Facility: CLINIC | Age: 48
End: 2023-06-01

## 2023-06-01 NOTE — TELEPHONE ENCOUNTER
Called patient, advised her on the Mupirocin ointment and reviewed the instruction. Patient understanding.

## 2023-06-01 NOTE — TELEPHONE ENCOUNTER
Spoke to patient, she would like to proceed with Dr. Marimar Borja suggestion for the Saline nasal flush with the antibiotic. Dr. Ramon Cagle, please review and advise. Thank you.

## 2023-06-22 ENCOUNTER — OFFICE VISIT (OUTPATIENT)
Dept: INTERNAL MEDICINE CLINIC | Facility: CLINIC | Age: 48
End: 2023-06-22
Payer: COMMERCIAL

## 2023-06-22 VITALS
RESPIRATION RATE: 16 BRPM | WEIGHT: 193 LBS | SYSTOLIC BLOOD PRESSURE: 110 MMHG | HEART RATE: 80 BPM | DIASTOLIC BLOOD PRESSURE: 80 MMHG | HEIGHT: 64.5 IN | BODY MASS INDEX: 32.55 KG/M2

## 2023-06-22 DIAGNOSIS — E88.81 INSULIN RESISTANCE: ICD-10-CM

## 2023-06-22 DIAGNOSIS — M25.562 BILATERAL CHRONIC KNEE PAIN: ICD-10-CM

## 2023-06-22 DIAGNOSIS — R19.7 DIARRHEA, UNSPECIFIED TYPE: ICD-10-CM

## 2023-06-22 DIAGNOSIS — Z90.49 HISTORY OF CHOLECYSTECTOMY: ICD-10-CM

## 2023-06-22 DIAGNOSIS — Z51.81 ENCOUNTER FOR THERAPEUTIC DRUG MONITORING: Primary | ICD-10-CM

## 2023-06-22 DIAGNOSIS — I10 BENIGN ESSENTIAL HYPERTENSION: ICD-10-CM

## 2023-06-22 DIAGNOSIS — E66.9 CLASS 1 OBESITY WITH SERIOUS COMORBIDITY AND BODY MASS INDEX (BMI) OF 32.0 TO 32.9 IN ADULT, UNSPECIFIED OBESITY TYPE: ICD-10-CM

## 2023-06-22 DIAGNOSIS — R63.8 CRAVING FOR PARTICULAR FOOD: ICD-10-CM

## 2023-06-22 DIAGNOSIS — M25.561 BILATERAL CHRONIC KNEE PAIN: ICD-10-CM

## 2023-06-22 DIAGNOSIS — G89.29 BILATERAL CHRONIC KNEE PAIN: ICD-10-CM

## 2023-06-22 PROBLEM — E66.811 CLASS 1 OBESITY WITH SERIOUS COMORBIDITY AND BODY MASS INDEX (BMI) OF 32.0 TO 32.9 IN ADULT: Status: ACTIVE | Noted: 2023-06-22

## 2023-06-22 PROCEDURE — 99214 OFFICE O/P EST MOD 30 MIN: CPT | Performed by: NURSE PRACTITIONER

## 2023-06-22 PROCEDURE — 3079F DIAST BP 80-89 MM HG: CPT | Performed by: NURSE PRACTITIONER

## 2023-06-22 PROCEDURE — 3074F SYST BP LT 130 MM HG: CPT | Performed by: NURSE PRACTITIONER

## 2023-06-22 PROCEDURE — 3008F BODY MASS INDEX DOCD: CPT | Performed by: NURSE PRACTITIONER

## 2023-06-22 RX ORDER — PHENTERMINE AND TOPIRAMATE 3.75; 23 MG/1; MG/1
1 CAPSULE, EXTENDED RELEASE ORAL DAILY
Qty: 30 CAPSULE | Refills: 0 | Status: SHIPPED | OUTPATIENT
Start: 2023-06-22

## 2023-06-22 RX ORDER — PANCRELIPASE LIPASE, PANCRELIPASE PROTEASE, PANCRELIPASE AMYLASE 40000; 126000; 168000 [USP'U]/1; [USP'U]/1; [USP'U]/1
CAPSULE, DELAYED RELEASE ORAL
Qty: 48 CAPSULE | Refills: 0 | COMMUNITY
Start: 2023-06-22

## 2023-06-26 ENCOUNTER — TELEPHONE (OUTPATIENT)
Dept: INTERNAL MEDICINE CLINIC | Facility: CLINIC | Age: 48
End: 2023-06-26

## 2023-06-26 NOTE — TELEPHONE ENCOUNTER
[facility] Friday that Qsymia interacts with patients BCP - makes it less effective. They are asking to proceed or not with filling RX. Will call and tell them to fill and remind her to use backup methods if sexually active.

## 2023-07-10 ENCOUNTER — LAB ENCOUNTER (OUTPATIENT)
Dept: LAB | Age: 48
End: 2023-07-10
Attending: NURSE PRACTITIONER
Payer: COMMERCIAL

## 2023-07-10 ENCOUNTER — PATIENT MESSAGE (OUTPATIENT)
Dept: INTERNAL MEDICINE CLINIC | Facility: CLINIC | Age: 48
End: 2023-07-10

## 2023-07-10 DIAGNOSIS — E66.9 CLASS 1 OBESITY WITH SERIOUS COMORBIDITY AND BODY MASS INDEX (BMI) OF 32.0 TO 32.9 IN ADULT, UNSPECIFIED OBESITY TYPE: ICD-10-CM

## 2023-07-10 DIAGNOSIS — Z51.81 ENCOUNTER FOR THERAPEUTIC DRUG MONITORING: ICD-10-CM

## 2023-07-10 DIAGNOSIS — R19.7 DIARRHEA, UNSPECIFIED TYPE: ICD-10-CM

## 2023-07-10 DIAGNOSIS — Z51.81 ENCOUNTER FOR THERAPEUTIC DRUG MONITORING: Primary | ICD-10-CM

## 2023-07-10 DIAGNOSIS — I10 BENIGN ESSENTIAL HYPERTENSION: ICD-10-CM

## 2023-07-10 DIAGNOSIS — E55.9 VITAMIN D DEFICIENCY: ICD-10-CM

## 2023-07-10 DIAGNOSIS — E88.81 INSULIN RESISTANCE: ICD-10-CM

## 2023-07-10 LAB
EST. AVERAGE GLUCOSE BLD GHB EST-MCNC: 105 MG/DL (ref 68–126)
HBA1C MFR BLD: 5.3 % (ref ?–5.7)
VIT B12 SERPL-MCNC: 260 PG/ML (ref 193–986)
VIT D+METAB SERPL-MCNC: 31.5 NG/ML (ref 30–100)

## 2023-07-10 PROCEDURE — 82306 VITAMIN D 25 HYDROXY: CPT

## 2023-07-10 PROCEDURE — 83036 HEMOGLOBIN GLYCOSYLATED A1C: CPT

## 2023-07-10 PROCEDURE — 82607 VITAMIN B-12: CPT

## 2023-07-11 NOTE — TELEPHONE ENCOUNTER
From: Rolando Shine  To: SHOSHANA Apodaca  Sent: 7/10/2023 7:47 PM CDT  Subject: Qsymia    Hello, there. I have been taking the Qsymia for a couple weeks now. I felt great the first day. I didn't feel zippy at all, which was good. I felt satisfied and not hungry! By day 2 or 3 I couldn't feel those effects really anymore. And then the cramping & intermittent bleeding started. I have been taking the pill continuously, so I don't get a period at all. I really don't like that this is a side effect of the Qsymia. I tried spacing out when i take them, like birth control at [address] at 6 or 15. . but it didn't seem to make a difference. In fact, the last few several days I found myself dreading having to take it and even skipping days to avoid the spotting, especially since i don't feel the effects from the Qsymia anymore. I'm almost afraid to jump to the next dosage (if/when you prescribe) bc it will probably effect my period even more, right? This is almost a deal breaker for me. Is there any other pill I can try that is as effective with weight loss but doesn't have   that topomax or whatever in it? I appreciate your time and professional opinion very much, thank you!

## 2023-07-12 ENCOUNTER — OFFICE VISIT (OUTPATIENT)
Dept: INTERNAL MEDICINE CLINIC | Facility: CLINIC | Age: 48
End: 2023-07-12
Payer: COMMERCIAL

## 2023-07-12 VITALS — WEIGHT: 191 LBS | BODY MASS INDEX: 32 KG/M2

## 2023-07-12 DIAGNOSIS — E88.81 INSULIN RESISTANCE: ICD-10-CM

## 2023-07-12 DIAGNOSIS — I10 BENIGN ESSENTIAL HYPERTENSION: Primary | ICD-10-CM

## 2023-07-12 DIAGNOSIS — R19.7 DIARRHEA, UNSPECIFIED TYPE: ICD-10-CM

## 2023-07-12 DIAGNOSIS — E66.9 CLASS 1 OBESITY WITH SERIOUS COMORBIDITY AND BODY MASS INDEX (BMI) OF 32.0 TO 32.9 IN ADULT, UNSPECIFIED OBESITY TYPE: ICD-10-CM

## 2023-07-12 PROCEDURE — 97802 MEDICAL NUTRITION INDIV IN: CPT | Performed by: DIETITIAN, REGISTERED

## 2023-07-13 RX ORDER — PHENTERMINE HYDROCHLORIDE 15 MG/1
15 CAPSULE ORAL EVERY MORNING
Qty: 30 CAPSULE | Refills: 1 | Status: SHIPPED | OUTPATIENT
Start: 2023-07-13

## 2023-08-18 DIAGNOSIS — I10 BENIGN ESSENTIAL HYPERTENSION: ICD-10-CM

## 2023-08-18 RX ORDER — LISINOPRIL 10 MG/1
10 TABLET ORAL DAILY
Qty: 30 TABLET | Refills: 0 | Status: SHIPPED | OUTPATIENT
Start: 2023-08-18

## 2023-08-22 ENCOUNTER — OFFICE VISIT (OUTPATIENT)
Dept: INTERNAL MEDICINE CLINIC | Facility: CLINIC | Age: 48
End: 2023-08-22
Payer: COMMERCIAL

## 2023-08-22 DIAGNOSIS — I10 BENIGN ESSENTIAL HYPERTENSION: Primary | ICD-10-CM

## 2023-08-22 DIAGNOSIS — E66.9 OBESITY (BMI 30.0-34.9): ICD-10-CM

## 2023-08-22 DIAGNOSIS — E88.81 INSULIN RESISTANCE: ICD-10-CM

## 2023-08-22 PROCEDURE — 97803 MED NUTRITION INDIV SUBSEQ: CPT | Performed by: DIETITIAN, REGISTERED

## 2023-08-23 VITALS — WEIGHT: 189 LBS | BODY MASS INDEX: 32 KG/M2

## 2023-08-28 ENCOUNTER — OFFICE VISIT (OUTPATIENT)
Dept: FAMILY MEDICINE CLINIC | Facility: CLINIC | Age: 48
End: 2023-08-28
Payer: COMMERCIAL

## 2023-08-28 VITALS
BODY MASS INDEX: 31.74 KG/M2 | RESPIRATION RATE: 16 BRPM | SYSTOLIC BLOOD PRESSURE: 130 MMHG | WEIGHT: 188.19 LBS | HEART RATE: 64 BPM | TEMPERATURE: 97 F | DIASTOLIC BLOOD PRESSURE: 88 MMHG | HEIGHT: 64.5 IN

## 2023-08-28 DIAGNOSIS — R31.29 MICROHEMATURIA: ICD-10-CM

## 2023-08-28 DIAGNOSIS — I10 BENIGN ESSENTIAL HYPERTENSION: Primary | ICD-10-CM

## 2023-08-28 PROCEDURE — 3079F DIAST BP 80-89 MM HG: CPT | Performed by: FAMILY MEDICINE

## 2023-08-28 PROCEDURE — 3008F BODY MASS INDEX DOCD: CPT | Performed by: FAMILY MEDICINE

## 2023-08-28 PROCEDURE — 99214 OFFICE O/P EST MOD 30 MIN: CPT | Performed by: FAMILY MEDICINE

## 2023-08-28 PROCEDURE — 3075F SYST BP GE 130 - 139MM HG: CPT | Performed by: FAMILY MEDICINE

## 2023-08-28 RX ORDER — LISINOPRIL 10 MG/1
10 TABLET ORAL DAILY
Qty: 90 TABLET | Refills: 1 | Status: SHIPPED | OUTPATIENT
Start: 2023-08-28

## 2023-09-27 ENCOUNTER — OFFICE VISIT (OUTPATIENT)
Dept: INTERNAL MEDICINE CLINIC | Facility: CLINIC | Age: 48
End: 2023-09-27
Payer: COMMERCIAL

## 2023-09-27 DIAGNOSIS — E66.9 CLASS 1 OBESITY WITH SERIOUS COMORBIDITY AND BODY MASS INDEX (BMI) OF 32.0 TO 32.9 IN ADULT, UNSPECIFIED OBESITY TYPE: ICD-10-CM

## 2023-09-27 DIAGNOSIS — Z51.81 ENCOUNTER FOR THERAPEUTIC DRUG MONITORING: Primary | ICD-10-CM

## 2023-09-27 DIAGNOSIS — I10 BENIGN ESSENTIAL HYPERTENSION: ICD-10-CM

## 2023-09-27 DIAGNOSIS — E88.819 INSULIN RESISTANCE: ICD-10-CM

## 2023-09-27 PROCEDURE — 3078F DIAST BP <80 MM HG: CPT | Performed by: NURSE PRACTITIONER

## 2023-09-27 PROCEDURE — 99213 OFFICE O/P EST LOW 20 MIN: CPT | Performed by: NURSE PRACTITIONER

## 2023-09-27 PROCEDURE — 3074F SYST BP LT 130 MM HG: CPT | Performed by: NURSE PRACTITIONER

## 2023-09-27 PROCEDURE — 3008F BODY MASS INDEX DOCD: CPT | Performed by: NURSE PRACTITIONER

## 2023-09-28 ENCOUNTER — TELEPHONE (OUTPATIENT)
Dept: INTERNAL MEDICINE CLINIC | Facility: CLINIC | Age: 48
End: 2023-09-28

## 2023-09-28 RX ORDER — BUPROPION HYDROCHLORIDE 150 MG/1
150 TABLET ORAL EVERY MORNING
Qty: 30 TABLET | Refills: 2 | Status: SHIPPED | OUTPATIENT
Start: 2023-09-28

## 2023-09-28 RX ORDER — NALTREXONE HYDROCHLORIDE 50 MG/1
25 TABLET, FILM COATED ORAL
Qty: 15 TABLET | Refills: 2 | Status: SHIPPED | OUTPATIENT
Start: 2023-09-28

## 2023-09-28 NOTE — TELEPHONE ENCOUNTER
Pt said she was seen Wednesday and thought Raiza Montes De Oca was sending 2 prescriptions to her pharmacy. Nothing has been sent.   Can you please verify

## 2023-09-30 VITALS
HEIGHT: 64.5 IN | SYSTOLIC BLOOD PRESSURE: 112 MMHG | BODY MASS INDEX: 31.2 KG/M2 | HEART RATE: 82 BPM | DIASTOLIC BLOOD PRESSURE: 78 MMHG | WEIGHT: 185 LBS

## 2023-10-09 ENCOUNTER — OFFICE VISIT (OUTPATIENT)
Dept: OTOLARYNGOLOGY | Facility: CLINIC | Age: 48
End: 2023-10-09

## 2023-10-09 VITALS — BODY MASS INDEX: 31.2 KG/M2 | WEIGHT: 185 LBS | HEIGHT: 64.5 IN

## 2023-10-09 DIAGNOSIS — J32.9 CHRONIC SINUSITIS, UNSPECIFIED LOCATION: Primary | ICD-10-CM

## 2023-10-09 DIAGNOSIS — R51.9 FACIAL PAIN: ICD-10-CM

## 2023-10-09 PROCEDURE — 31231 NASAL ENDOSCOPY DX: CPT | Performed by: STUDENT IN AN ORGANIZED HEALTH CARE EDUCATION/TRAINING PROGRAM

## 2023-10-09 PROCEDURE — 3008F BODY MASS INDEX DOCD: CPT | Performed by: STUDENT IN AN ORGANIZED HEALTH CARE EDUCATION/TRAINING PROGRAM

## 2023-10-09 PROCEDURE — 99214 OFFICE O/P EST MOD 30 MIN: CPT | Performed by: STUDENT IN AN ORGANIZED HEALTH CARE EDUCATION/TRAINING PROGRAM

## 2023-10-17 ENCOUNTER — OFFICE VISIT (OUTPATIENT)
Dept: INTERNAL MEDICINE CLINIC | Facility: CLINIC | Age: 48
End: 2023-10-17
Payer: COMMERCIAL

## 2023-10-17 DIAGNOSIS — E66.9 OBESITY (BMI 30.0-34.9): Primary | ICD-10-CM

## 2023-10-17 DIAGNOSIS — K21.9 GASTROESOPHAGEAL REFLUX DISEASE, UNSPECIFIED WHETHER ESOPHAGITIS PRESENT: ICD-10-CM

## 2023-10-17 DIAGNOSIS — I10 PRIMARY HYPERTENSION: ICD-10-CM

## 2023-10-17 DIAGNOSIS — N20.0 KIDNEY STONES: ICD-10-CM

## 2023-10-17 PROCEDURE — 97803 MED NUTRITION INDIV SUBSEQ: CPT | Performed by: DIETITIAN, REGISTERED

## 2023-10-18 VITALS — BODY MASS INDEX: 31 KG/M2 | WEIGHT: 183 LBS

## 2023-11-21 ENCOUNTER — TELEMEDICINE (OUTPATIENT)
Dept: INTERNAL MEDICINE CLINIC | Facility: CLINIC | Age: 48
End: 2023-11-21
Payer: COMMERCIAL

## 2023-11-21 DIAGNOSIS — E66.9 CLASS 1 OBESITY WITH SERIOUS COMORBIDITY AND BODY MASS INDEX (BMI) OF 31.0 TO 31.9 IN ADULT, UNSPECIFIED OBESITY TYPE: ICD-10-CM

## 2023-11-21 DIAGNOSIS — Z51.81 ENCOUNTER FOR THERAPEUTIC DRUG MONITORING: Primary | ICD-10-CM

## 2023-11-21 DIAGNOSIS — I10 PRIMARY HYPERTENSION: ICD-10-CM

## 2023-11-21 PROBLEM — E66.811 CLASS 1 OBESITY WITH SERIOUS COMORBIDITY AND BODY MASS INDEX (BMI) OF 31.0 TO 31.9 IN ADULT: Status: ACTIVE | Noted: 2023-11-21

## 2023-11-21 RX ORDER — PHENTERMINE HYDROCHLORIDE 15 MG/1
15 CAPSULE ORAL EVERY MORNING
Qty: 30 CAPSULE | Refills: 2 | Status: SHIPPED | OUTPATIENT
Start: 2023-11-21

## 2023-11-21 RX ORDER — BUPROPION HYDROCHLORIDE 150 MG/1
150 TABLET ORAL EVERY MORNING
Qty: 30 TABLET | Refills: 2 | Status: SHIPPED | OUTPATIENT
Start: 2023-11-21

## 2023-12-05 ENCOUNTER — OFFICE VISIT (OUTPATIENT)
Dept: INTERNAL MEDICINE CLINIC | Facility: CLINIC | Age: 48
End: 2023-12-05
Payer: COMMERCIAL

## 2023-12-05 DIAGNOSIS — I10 PRIMARY HYPERTENSION: ICD-10-CM

## 2023-12-05 DIAGNOSIS — N20.0 KIDNEY STONES: ICD-10-CM

## 2023-12-05 DIAGNOSIS — E66.9 OBESITY (BMI 30.0-34.9): Primary | ICD-10-CM

## 2023-12-05 DIAGNOSIS — K21.9 GASTROESOPHAGEAL REFLUX DISEASE, UNSPECIFIED WHETHER ESOPHAGITIS PRESENT: ICD-10-CM

## 2023-12-05 PROCEDURE — 97803 MED NUTRITION INDIV SUBSEQ: CPT | Performed by: DIETITIAN, REGISTERED

## 2023-12-06 VITALS — BODY MASS INDEX: 31 KG/M2 | WEIGHT: 183 LBS

## 2023-12-19 ENCOUNTER — OFFICE VISIT (OUTPATIENT)
Dept: FAMILY MEDICINE CLINIC | Facility: CLINIC | Age: 48
End: 2023-12-19
Payer: COMMERCIAL

## 2023-12-19 VITALS
RESPIRATION RATE: 16 BRPM | OXYGEN SATURATION: 98 % | WEIGHT: 179.19 LBS | BODY MASS INDEX: 30.22 KG/M2 | SYSTOLIC BLOOD PRESSURE: 123 MMHG | DIASTOLIC BLOOD PRESSURE: 83 MMHG | HEART RATE: 98 BPM | TEMPERATURE: 98 F | HEIGHT: 64.5 IN

## 2023-12-19 DIAGNOSIS — R05.1 ACUTE COUGH: ICD-10-CM

## 2023-12-19 DIAGNOSIS — R07.89 CHEST TIGHTNESS: ICD-10-CM

## 2023-12-19 DIAGNOSIS — B37.9 ANTIBIOTIC-INDUCED YEAST INFECTION: ICD-10-CM

## 2023-12-19 DIAGNOSIS — T36.95XA ANTIBIOTIC-INDUCED YEAST INFECTION: ICD-10-CM

## 2023-12-19 DIAGNOSIS — J01.90 ACUTE NON-RECURRENT SINUSITIS, UNSPECIFIED LOCATION: Primary | ICD-10-CM

## 2023-12-19 DIAGNOSIS — R06.2 WHEEZING: ICD-10-CM

## 2023-12-19 PROCEDURE — 3079F DIAST BP 80-89 MM HG: CPT | Performed by: NURSE PRACTITIONER

## 2023-12-19 PROCEDURE — 99213 OFFICE O/P EST LOW 20 MIN: CPT | Performed by: NURSE PRACTITIONER

## 2023-12-19 PROCEDURE — 3074F SYST BP LT 130 MM HG: CPT | Performed by: NURSE PRACTITIONER

## 2023-12-19 PROCEDURE — 3008F BODY MASS INDEX DOCD: CPT | Performed by: NURSE PRACTITIONER

## 2023-12-19 RX ORDER — ALBUTEROL SULFATE 90 UG/1
1-2 AEROSOL, METERED RESPIRATORY (INHALATION)
Qty: 8 G | Refills: 0 | Status: SHIPPED | OUTPATIENT
Start: 2023-12-19

## 2023-12-19 RX ORDER — BENZONATATE 200 MG/1
200 CAPSULE ORAL 3 TIMES DAILY PRN
Qty: 21 CAPSULE | Refills: 0 | Status: SHIPPED | OUTPATIENT
Start: 2023-12-19

## 2023-12-19 RX ORDER — DOXYCYCLINE HYCLATE 100 MG
100 TABLET ORAL 2 TIMES DAILY
Qty: 14 TABLET | Refills: 0 | Status: SHIPPED | OUTPATIENT
Start: 2023-12-19 | End: 2023-12-26

## 2023-12-19 RX ORDER — FLUCONAZOLE 150 MG/1
150 TABLET ORAL DAILY
Qty: 1 TABLET | Refills: 1 | Status: SHIPPED | OUTPATIENT
Start: 2023-12-19

## 2023-12-19 NOTE — PATIENT INSTRUCTIONS
Tylenol and Motrin as needed  Rest, push fluids  Mucinex DM over the counter for nasal congestion/cough  START daily antihistamine (Zyrtec, Claritin, Allegra) and choose one of those. Take daily for 1-2 weeks to help with any post nasal drainage/sore throat  START Flonase nasal spray daily.  1 spray in each nostril  Doxycycline (antibiotic) for sinus infection, finish all 7 days  Tessalon as needed, as prescribed for cough  Albuterol as needed, as prescribed for chest tightness, shortness of breath or wheezing  Return to care for new/worsening symptoms

## 2023-12-26 ENCOUNTER — PATIENT MESSAGE (OUTPATIENT)
Dept: INTERNAL MEDICINE CLINIC | Facility: CLINIC | Age: 48
End: 2023-12-26

## 2023-12-26 DIAGNOSIS — E66.9 CLASS 1 OBESITY WITH SERIOUS COMORBIDITY AND BODY MASS INDEX (BMI) OF 31.0 TO 31.9 IN ADULT, UNSPECIFIED OBESITY TYPE: Primary | ICD-10-CM

## 2023-12-26 DIAGNOSIS — Z51.81 ENCOUNTER FOR THERAPEUTIC DRUG MONITORING: ICD-10-CM

## 2023-12-27 NOTE — TELEPHONE ENCOUNTER
From: Ria Loving  To: Taco Eddy  Sent: 12/26/2023 9:17 PM CST  Subject: Increase phentermine dose    Hi there, I'm currently taking 15mg of phentermine along with 150 mg of wellbutrin. Is it possible to increase the phentermine? I have been doubling the dosage and it seems to work great.    Thank you, Kurtis Buerger

## 2023-12-27 NOTE — TELEPHONE ENCOUNTER
Requesting   Phentermine increase    LOV: 11/21/23  RTC: 3 months  Filled: 11/21/23 #30 with 2 refills    Future Appointments   Date Time Provider Carmelo Rios   12/29/2023 11:00 AM Dressler, Jennifer Lerner, PA-C [id number]   2/7/2024 11:00 AM Katerine Kaplan RD [facility]I [facility]     Pt wants to increase phentermine, already doubling dose an her own

## 2023-12-30 RX ORDER — PHENTERMINE HYDROCHLORIDE 30 MG/1
30 CAPSULE ORAL EVERY MORNING
Qty: 30 CAPSULE | Refills: 1 | Status: SHIPPED | OUTPATIENT
Start: 2023-12-30

## 2024-01-08 ENCOUNTER — LAB ENCOUNTER (OUTPATIENT)
Dept: LAB | Age: 49
End: 2024-01-08
Attending: FAMILY MEDICINE
Payer: COMMERCIAL

## 2024-01-08 DIAGNOSIS — R74.01 ELEVATED ALANINE AMINOTRANSFERASE (ALT) LEVEL: Primary | ICD-10-CM

## 2024-01-08 DIAGNOSIS — R31.29 MICROHEMATURIA: ICD-10-CM

## 2024-01-08 DIAGNOSIS — I10 BENIGN ESSENTIAL HYPERTENSION: ICD-10-CM

## 2024-01-08 LAB
ALBUMIN SERPL-MCNC: 3.7 G/DL (ref 3.4–5)
ALBUMIN/GLOB SERPL: 1.1 {RATIO} (ref 1–2)
ALP LIVER SERPL-CCNC: 82 U/L
ALT SERPL-CCNC: 77 U/L
ANION GAP SERPL CALC-SCNC: 4 MMOL/L (ref 0–18)
AST SERPL-CCNC: 22 U/L (ref 15–37)
BASOPHILS # BLD AUTO: 0.04 X10(3) UL (ref 0–0.2)
BASOPHILS NFR BLD AUTO: 0.5 %
BILIRUB SERPL-MCNC: 0.6 MG/DL (ref 0.1–2)
BUN BLD-MCNC: 9 MG/DL (ref 9–23)
CALCIUM BLD-MCNC: 9.1 MG/DL (ref 8.5–10.1)
CHLORIDE SERPL-SCNC: 108 MMOL/L (ref 98–112)
CHOLEST SERPL-MCNC: 185 MG/DL (ref ?–200)
CO2 SERPL-SCNC: 26 MMOL/L (ref 21–32)
CREAT BLD-MCNC: 0.72 MG/DL
EGFRCR SERPLBLD CKD-EPI 2021: 104 ML/MIN/1.73M2 (ref 60–?)
EOSINOPHIL # BLD AUTO: 0.2 X10(3) UL (ref 0–0.7)
EOSINOPHIL NFR BLD AUTO: 2.6 %
ERYTHROCYTE [DISTWIDTH] IN BLOOD BY AUTOMATED COUNT: 13.2 %
FASTING PATIENT LIPID ANSWER: YES
FASTING STATUS PATIENT QL REPORTED: YES
GLOBULIN PLAS-MCNC: 3.5 G/DL (ref 2.8–4.4)
GLUCOSE BLD-MCNC: 85 MG/DL (ref 70–99)
HCT VFR BLD AUTO: 47.7 %
HDLC SERPL-MCNC: 50 MG/DL (ref 40–59)
HGB BLD-MCNC: 15.1 G/DL
IMM GRANULOCYTES # BLD AUTO: 0.02 X10(3) UL (ref 0–1)
IMM GRANULOCYTES NFR BLD: 0.3 %
LDLC SERPL CALC-MCNC: 123 MG/DL (ref ?–100)
LYMPHOCYTES # BLD AUTO: 2.03 X10(3) UL (ref 1–4)
LYMPHOCYTES NFR BLD AUTO: 26.1 %
MCH RBC QN AUTO: 28.9 PG (ref 26–34)
MCHC RBC AUTO-ENTMCNC: 31.7 G/DL (ref 31–37)
MCV RBC AUTO: 91.2 FL
MONOCYTES # BLD AUTO: 0.46 X10(3) UL (ref 0.1–1)
MONOCYTES NFR BLD AUTO: 5.9 %
NEUTROPHILS # BLD AUTO: 5.02 X10 (3) UL (ref 1.5–7.7)
NEUTROPHILS # BLD AUTO: 5.02 X10(3) UL (ref 1.5–7.7)
NEUTROPHILS NFR BLD AUTO: 64.6 %
NONHDLC SERPL-MCNC: 135 MG/DL (ref ?–130)
OSMOLALITY SERPL CALC.SUM OF ELEC: 284 MOSM/KG (ref 275–295)
PLATELET # BLD AUTO: 403 10(3)UL (ref 150–450)
POTASSIUM SERPL-SCNC: 4.2 MMOL/L (ref 3.5–5.1)
PROT SERPL-MCNC: 7.2 G/DL (ref 6.4–8.2)
RBC # BLD AUTO: 5.23 X10(6)UL
SODIUM SERPL-SCNC: 138 MMOL/L (ref 136–145)
TRIGL SERPL-MCNC: 61 MG/DL (ref 30–149)
VLDLC SERPL CALC-MCNC: 11 MG/DL (ref 0–30)
WBC # BLD AUTO: 7.8 X10(3) UL (ref 4–11)

## 2024-01-08 PROCEDURE — 85025 COMPLETE CBC W/AUTO DIFF WBC: CPT

## 2024-01-08 PROCEDURE — 80061 LIPID PANEL: CPT

## 2024-01-08 PROCEDURE — 80053 COMPREHEN METABOLIC PANEL: CPT

## 2024-01-10 ENCOUNTER — LAB ENCOUNTER (OUTPATIENT)
Dept: LAB | Age: 49
End: 2024-01-10
Attending: FAMILY MEDICINE
Payer: COMMERCIAL

## 2024-01-10 DIAGNOSIS — R31.29 MICROHEMATURIA: ICD-10-CM

## 2024-01-10 LAB
BILIRUB UR QL STRIP.AUTO: NEGATIVE
CLARITY UR REFRACT.AUTO: CLEAR
GLUCOSE UR STRIP.AUTO-MCNC: NORMAL MG/DL
KETONES UR STRIP.AUTO-MCNC: NEGATIVE MG/DL
LEUKOCYTE ESTERASE UR QL STRIP.AUTO: NEGATIVE
NITRITE UR QL STRIP.AUTO: NEGATIVE
PH UR STRIP.AUTO: 6 [PH] (ref 5–8)
PROT UR STRIP.AUTO-MCNC: NEGATIVE MG/DL
RBC UR QL AUTO: NEGATIVE
SP GR UR STRIP.AUTO: 1.01 (ref 1–1.03)
UROBILINOGEN UR STRIP.AUTO-MCNC: NORMAL MG/DL

## 2024-01-10 PROCEDURE — 81003 URINALYSIS AUTO W/O SCOPE: CPT

## 2024-01-11 ENCOUNTER — OFFICE VISIT (OUTPATIENT)
Dept: FAMILY MEDICINE CLINIC | Facility: CLINIC | Age: 49
End: 2024-01-11
Payer: COMMERCIAL

## 2024-01-11 VITALS
BODY MASS INDEX: 30.19 KG/M2 | OXYGEN SATURATION: 99 % | HEART RATE: 110 BPM | DIASTOLIC BLOOD PRESSURE: 80 MMHG | SYSTOLIC BLOOD PRESSURE: 120 MMHG | RESPIRATION RATE: 18 BRPM | HEIGHT: 64.5 IN | WEIGHT: 179 LBS

## 2024-01-11 DIAGNOSIS — Z87.19 HISTORY OF FATTY INFILTRATION OF LIVER: ICD-10-CM

## 2024-01-11 DIAGNOSIS — Z00.00 ROUTINE GENERAL MEDICAL EXAMINATION AT A HEALTH CARE FACILITY: Primary | ICD-10-CM

## 2024-01-11 DIAGNOSIS — R74.8 ELEVATED LIVER ENZYMES: ICD-10-CM

## 2024-01-11 DIAGNOSIS — I10 BENIGN ESSENTIAL HYPERTENSION: ICD-10-CM

## 2024-01-11 RX ORDER — LISINOPRIL 10 MG/1
10 TABLET ORAL DAILY
Qty: 90 TABLET | Refills: 1 | Status: SHIPPED | OUTPATIENT
Start: 2024-01-11

## 2024-01-11 NOTE — PROGRESS NOTES
CHIEF COMPLAINT   Complete physical  HPI:   Bethany Barriga is a 48 year old female who presents for a complete physical exam.   Gyne for Pap/pelvic/breast  Fatty liver noted on surgical report from gallbladder removal per patient.  ALT recently elevated.     Wt Readings from Last 6 Encounters:   01/11/24 179 lb (81.2 kg)   12/19/23 179 lb 3.2 oz (81.3 kg)   12/05/23 183 lb (83 kg)   10/17/23 183 lb (83 kg)   10/09/23 185 lb (83.9 kg)   09/27/23 185 lb (83.9 kg)     Body mass index is 30.25 kg/m².     Cholesterol, Total (mg/dL)   Date Value   01/08/2024 185   11/18/2022 174   11/16/2020 191     CHOLESTEROL, TOTAL (mg/dL)   Date Value   09/11/2015 173   03/21/2013 179     HDL Cholesterol (mg/dL)   Date Value   01/08/2024 50   11/18/2022 44   11/16/2020 44     HDL CHOLESTEROL (mg/dL)   Date Value   09/11/2015 46   03/21/2013 50     LDL Cholesterol (mg/dL)   Date Value   01/08/2024 123 (H)   11/18/2022 115 (H)   11/16/2020 135 (H)     LDL-CHOLESTEROL (mg/dL (calc))   Date Value   09/11/2015 116   03/21/2013 118     AST (U/L)   Date Value   01/08/2024 22   11/18/2022 17   09/20/2021 26   09/11/2015 15   03/21/2013 16   02/24/2012 15     ALT (U/L)   Date Value   01/08/2024 77 (H)   11/18/2022 32   09/20/2021 59 (H)   09/11/2015 16   03/21/2013 20   02/24/2012 17        Current Outpatient Medications   Medication Sig Dispense Refill    Phentermine HCl 30 MG Oral Cap Take 1 capsule (30 mg total) by mouth every morning. 30 capsule 1    buPROPion  MG Oral Tablet 24 Hr Take 1 tablet (150 mg total) by mouth every morning. 30 tablet 2    lisinopril 10 MG Oral Tab Take 1 tablet (10 mg total) by mouth daily. 90 tablet 1    Levonorgest-Eth Estrad 91-Day 0.15-0.03 &0.01 MG Oral Tab Take 1 tablet by mouth daily. 91 tablet 3    ketoconazole 2 % External Shampoo       mometasone 0.1 % External Ointment       Ferrous Sulfate ER (SLOW FE) 142 (45 Fe) MG Oral Tab CR Take by mouth every other day.  0    Cholecalciferol (VITAMIN D) 25  MCG (1000 UT) Oral Tab Take 1,000 Units by mouth daily.        Fluocinonide 0.05 % External Solution Apply once daily as needed.  Max 2 weeks. 60 mL 2      Allergies   Allergen Reactions    Omnicef [Cefdinir] FACE FLUSHING    Penicillins FACE FLUSHING     Redness all over body, digestive issues and incontinence       Past Medical History:   Diagnosis Date    Abdominal hernia 2003, 2018    Left inguinal hernia repaired in 2003 and i currently have a right inguinal hernia diagnosed in 2018 but not repaired yet    Blood in urine 2019?    Microscopic amounts noted in routine urine samples over the last few years    Calculus of kidney During ultrasound 2/23    2 small stones noted    Change in hair 2019    Hair loss    Decorative tattoo 1995    Diarrhea, unspecified 2021    During heavy antibiotic use and now very often after removal of gallbladder 6/21    Esophageal reflux     Frequent bowel movements     Heartburn     Hemorrhoid     Hemorrhoids 2000    High blood pressure     Human papilloma virus infection     20 year ago per pt    Pain in joints 2012ish    Knees    Pap smear for cervical cancer screening 05/16, 12/14, 10/12, 10/11, 09/10, 09/09    negative hpv negative    PONV (postoperative nausea and vomiting)     Psoriasis     Stool incontinence 2021    During heavy antibiotic use and now occasionally since gallbladder removal 6/21    Stress 2020    I have a young adult son with severe mental health problems    Vertigo     Vertigo     Visual impairment     glasses    Wears glasses 1994      Past Surgical History:   Procedure Laterality Date    CHOLECYSTECTOMY  6/21    COLPOSCOPY,BX CERVIX/ENDOCERV CURR  08/30/2021    HERNIA SURGERY  10/01/2003    Left    LAPAROSCOPIC CHOLECYSTECTOMY  06/18/2021    NASAL SCOPY,REMV PART ETHMOID  05/14/2021    NASAL SCOPY,RMV TISS MAXILL SINUS Left 05/14/2021    ORAL SURGERY      REMOVAL OF OVARIAN CYST(S)  2007      Family History   Problem Relation Age of Onset    Other (HTN)  Mother     Heart Disorder Father     Other (HTN) Father     Diabetes Maternal Grandfather     Breast Cancer Paternal Grandmother 40        In her 40's      Social History:   Social History     Socioeconomic History    Marital status:    Tobacco Use    Smoking status: Never    Smokeless tobacco: Never   Vaping Use    Vaping Use: Never used   Substance and Sexual Activity    Alcohol use: No    Drug use: No    Sexual activity: Not Currently     Birth control/protection: OCP   Other Topics Concern    Caffeine Concern Yes     Comment: 1-2 coffee qam    Exercise Yes     Comment: once in a while    Seat Belt Yes        REVIEW OF SYSTEMS:   GENERAL: feels well otherwise  SKIN: no complaint of any unusual skin lesions  EYES: no complaint of blurred vision or double vision  HEENT: no complaint of nasal congestion, sinus pain or ST  LUNGS: no complaint of shortness of breath with exertion  CARDIOVASCULAR: no complaint of chest pain on exertion  GI: no complaint of pain,denies heartburn  : no complaints of vaginal discharge or urinary complaints  MUSCULOSKELETAL: no complaint of back pain  NEURO: no complaint of headaches  PSYCHE: no complaint ofdepression or anxiety    EXAM:   /80 (BP Location: Left arm, Patient Position: Sitting)   Pulse 110   Resp 18   Ht 5' 4.5\" (1.638 m)   Wt 179 lb (81.2 kg)   SpO2 99%   BMI 30.25 kg/m²   Body mass index is 30.25 kg/m².   GENERAL: well developed, well nourished,in no apparent distress  SKIN: no rashes,no suspicious lesions  HEENT: atraumatic, normocephalic,ears and throat are clear  EYES: EOMI, conjunctiva are clear  NECK: supple,no adenopathy,no bruits, no thyroid masses.  BREAST:DEFERRED TO GYNE  LUNGS: clear to auscultation; no rhonchi, rales, or wheezing  CARDIO: RRR without murmur  GI: good BS's,no masses, organomegaly or tenderness  :DEFERRED TO GYNE   MUSCULOSKELETAL: No obvious joint deformity or swelling.  Normal gait.  EXTREMITIES: no cyanosis, clubbing  or edema  NEURO: Oriented times three,cranial nerves are grossly intact,motor and sensory are grossly intact    ASSESSMENT AND PLAN:   Bethany Barriga is a 48 year old female who presents for a complete physical exam.  Pap and pelvic deferred to gyne per patient request.   Pt' s weight is Body mass index is 30.25 kg/m².. Recommended regular exercise.  The patient indicates understanding of these issues and agrees to the plan.   Encounter Diagnoses   Name Primary?    Routine general medical examination at a health care facility Yes    History of fatty infiltration of liver     Elevated liver enzymes     Benign essential hypertension        No orders of the defined types were placed in this encounter.      Meds & Refills for this Visit:  Requested Prescriptions     Signed Prescriptions Disp Refills    lisinopril 10 MG Oral Tab 90 tablet 1     Sig: Take 1 tablet (10 mg total) by mouth daily.       Imaging & Consults:  US LIVER WITH ELASTOGRAPHY(CPT=76705,88274)    Follow-up 6 months and as needed.

## 2024-01-20 ENCOUNTER — APPOINTMENT (OUTPATIENT)
Dept: CT IMAGING | Age: 49
End: 2024-01-20
Attending: STUDENT IN AN ORGANIZED HEALTH CARE EDUCATION/TRAINING PROGRAM
Payer: COMMERCIAL

## 2024-01-20 ENCOUNTER — HOSPITAL ENCOUNTER (OUTPATIENT)
Age: 49
Discharge: HOME | End: 2024-01-20
Attending: STUDENT IN AN ORGANIZED HEALTH CARE EDUCATION/TRAINING PROGRAM
Payer: COMMERCIAL

## 2024-01-20 VITALS
TEMPERATURE: 97 F | WEIGHT: 180 LBS | OXYGEN SATURATION: 98 % | HEART RATE: 96 BPM | DIASTOLIC BLOOD PRESSURE: 93 MMHG | RESPIRATION RATE: 16 BRPM | SYSTOLIC BLOOD PRESSURE: 147 MMHG | BODY MASS INDEX: 29.99 KG/M2 | HEIGHT: 65 IN

## 2024-01-20 DIAGNOSIS — K40.91 UNILATERAL RECURRENT INGUINAL HERNIA WITHOUT OBSTRUCTION OR GANGRENE: Primary | ICD-10-CM

## 2024-01-20 LAB
#MXD IC: 0.5 X10ˆ3/UL (ref 0.1–1)
B-HCG UR QL: NEGATIVE
BILIRUB UR QL STRIP: NEGATIVE
BUN BLD-MCNC: 7 MG/DL (ref 7–18)
CHLORIDE BLD-SCNC: 104 MMOL/L (ref 98–112)
CO2 BLD-SCNC: 22 MMOL/L (ref 21–32)
CREAT BLD-MCNC: 0.6 MG/DL
EGFRCR SERPLBLD CKD-EPI 2021: 111 ML/MIN/1.73M2 (ref 60–?)
GLUCOSE BLD-MCNC: 90 MG/DL (ref 70–99)
GLUCOSE UR STRIP-MCNC: NEGATIVE MG/DL
HCT VFR BLD AUTO: 46.6 %
HCT VFR BLD CALC: 46 %
HGB BLD-MCNC: 14.7 G/DL
ISTAT IONIZED CALCIUM FOR CHEM 8: 1.2 MMOL/L (ref 1.12–1.32)
KETONES UR STRIP-MCNC: NEGATIVE MG/DL
LYMPHOCYTES # BLD AUTO: 1.9 X10ˆ3/UL (ref 1–4)
LYMPHOCYTES NFR BLD AUTO: 26.7 %
MCH RBC QN AUTO: 28.4 PG (ref 26–34)
MCHC RBC AUTO-ENTMCNC: 31.5 G/DL (ref 31–37)
MCV RBC AUTO: 90 FL (ref 80–100)
MIXED CELL %: 6.7 %
NEUTROPHILS # BLD AUTO: 4.7 X10ˆ3/UL (ref 1.5–7.7)
NEUTROPHILS NFR BLD AUTO: 66.6 %
NITRITE UR QL STRIP: NEGATIVE
PH UR STRIP: 7 [PH]
PLATELET # BLD AUTO: 389 X10ˆ3/UL (ref 150–450)
POTASSIUM BLD-SCNC: 4 MMOL/L (ref 3.6–5.1)
PROT UR STRIP-MCNC: NEGATIVE MG/DL
RBC # BLD AUTO: 5.18 X10ˆ6/UL
SODIUM BLD-SCNC: 140 MMOL/L (ref 136–145)
SP GR UR STRIP: 1.02
UROBILINOGEN UR STRIP-ACNC: <2 MG/DL
WBC # BLD AUTO: 7.1 X10ˆ3/UL (ref 4–11)

## 2024-01-20 PROCEDURE — 87086 URINE CULTURE/COLONY COUNT: CPT | Performed by: STUDENT IN AN ORGANIZED HEALTH CARE EDUCATION/TRAINING PROGRAM

## 2024-01-20 PROCEDURE — 96360 HYDRATION IV INFUSION INIT: CPT

## 2024-01-20 PROCEDURE — 81025 URINE PREGNANCY TEST: CPT

## 2024-01-20 PROCEDURE — 85025 COMPLETE CBC W/AUTO DIFF WBC: CPT | Performed by: STUDENT IN AN ORGANIZED HEALTH CARE EDUCATION/TRAINING PROGRAM

## 2024-01-20 PROCEDURE — 99215 OFFICE O/P EST HI 40 MIN: CPT

## 2024-01-20 PROCEDURE — 80047 BASIC METABLC PNL IONIZED CA: CPT

## 2024-01-20 PROCEDURE — 74177 CT ABD & PELVIS W/CONTRAST: CPT | Performed by: STUDENT IN AN ORGANIZED HEALTH CARE EDUCATION/TRAINING PROGRAM

## 2024-01-20 PROCEDURE — 81002 URINALYSIS NONAUTO W/O SCOPE: CPT

## 2024-01-20 NOTE — DISCHARGE INSTRUCTIONS
Today your CT scan shows that you have a hernia however it does not contain small bowel but it actually contains fat.  There are no signs of acute emergency in your abdomen.  I recommend follow-up with your PCP and your prior surgeon for further evaluation and management.

## 2024-01-20 NOTE — ED INITIAL ASSESSMENT (HPI)
Has a right sided hernia diagnosed in 2018. Yesterday pain increased with chills. Has groin pain that is \"raised in the right groin\". Thinks she may have a kidney stone. States she had a stone diagnosed about a year ago. Denies nausea/vomiting/diarrhea.

## 2024-01-20 NOTE — ED PROVIDER NOTES
Patient Seen in: [facility]      History     Chief Complaint   Patient presents with    Abdomen/Flank Pain     Stated Complaint: Eval-G    Subjective:   HPI    Patient is a 48-year-old female with a history of bilateral inguinal hernias status post left inguinal hernia repair in 2003 who presents with right sided abdominal and inguinal region pain.  She states she is always had a dull ache from the right inguinal hernia which she was diagnosed with in 2018 however yesterday pain was fairly constant and more uncomfortable than usual.  She cannot find a comfortable position and she states that she had decreased appetite.  She was able to pass gas and has had a bowel movement.  She has had no associated fevers.  She notes that she feels some suprapubic pressure but denies any dysuria though notes increased urinary frequency.  Patient states she was told that she had stones within her kidney seen on imaging in the past but she has never parents renal colic before.    Objective:   Past Medical History:   Diagnosis Date    Abdominal hernia 2003, 2018    Left inguinal hernia repaired in 2003 and i currently have a right inguinal hernia diagnosed in 2018 but not repaired yet    Blood in urine 2019?    Microscopic amounts noted in routine urine samples over the last few years    Calculus of kidney During ultrasound 2/23    2 small stones noted    Change in hair 2019    Hair loss    Decorative tattoo 1995    Diarrhea, unspecified 2021    During heavy antibiotic use and now very often after removal of gallbladder 6/21    Esophageal reflux     Frequent bowel movements     Heartburn     Hemorrhoid     Hemorrhoids 2000    High blood pressure     Human papilloma virus infection     20 year ago per pt    Pain in joints 2012ish    Knees    Pap smear for cervical cancer screening 05/16, 12/14, 10/12, 10/11, 09/10, 09/09    negative hpv negative    PONV (postoperative nausea and vomiting)     Psoriasis     Stool  incontinence 2021    During heavy antibiotic use and now occasionally since gallbladder removal 6/21    Stress 2020    I have a young adult son with severe mental health problems    Vertigo     Vertigo     Visual impairment     glasses    Wears glasses 1994              Past Surgical History:   Procedure Laterality Date    CHOLECYSTECTOMY  6/21    COLPOSCOPY,BX CERVIX/ENDOCERV CURR  08/30/2021    HERNIA SURGERY  10/01/2003    Left    LAPAROSCOPIC CHOLECYSTECTOMY  06/18/2021    NASAL SCOPY,REMV PART ETHMOID  05/14/2021    NASAL SCOPY,RMV TISS MAXILL SINUS Left 05/14/2021    ORAL SURGERY      REMOVAL OF OVARIAN CYST(S)  2007                Social History     Socioeconomic History    Marital status:    Tobacco Use    Smoking status: Never     Passive exposure: Never    Smokeless tobacco: Never   Vaping Use    Vaping Use: Never used   Substance and Sexual Activity    Alcohol use: No    Drug use: No    Sexual activity: Not Currently     Birth control/protection: OCP   Other Topics Concern    Caffeine Concern Yes     Comment: 1-2 coffee qam    Exercise Yes     Comment: once in a while    Seat Belt Yes              Review of Systems    Positive for stated complaint: Eval-G  Other systems are as noted in HPI.  Constitutional and vital signs reviewed.      All other systems reviewed and negative except as noted above.    Physical Exam     ED Triage Vitals [01/20/24 0943]   BP (!) 147/93   Pulse 94   Resp 20   Temp 97.2 °F (36.2 °C)   Temp src Temporal   SpO2 97 %   O2 Device None (Room air)       Current:BP (!) 147/93   Pulse 94   Temp 97.2 °F (36.2 °C) (Temporal)   Resp 20   Ht 165.1 cm (5' 5\")   Wt 81.6 kg   SpO2 97%   BMI 29.95 kg/m²         Physical Exam  Vitals and nursing note reviewed.   Constitutional:       General: She is not in acute distress.     Appearance: Normal appearance.   HENT:      Head: Normocephalic.      Nose: Nose normal.      Mouth/Throat:      Mouth: Mucous membranes are moist.    Eyes:      Extraocular Movements: Extraocular movements intact.      Pupils: Pupils are equal, round, and reactive to light.   Cardiovascular:      Rate and Rhythm: Normal rate and regular rhythm.      Pulses: Normal pulses.   Pulmonary:      Effort: Pulmonary effort is normal.   Abdominal:      General: Abdomen is flat. Bowel sounds are normal. There is no distension.      Palpations: Abdomen is soft.      Tenderness: There is abdominal tenderness in the right lower quadrant. There is no right CVA tenderness, left CVA tenderness, guarding or rebound.      Hernia: No hernia is present.      Comments: Unable to palpate a hernia which could be secondary to body habitus.   Musculoskeletal:         General: No swelling or tenderness. Normal range of motion.      Cervical back: Normal range of motion.   Skin:     General: Skin is warm and dry.   Neurological:      Mental Status: She is alert and oriented to person, place, and time. Mental status is at baseline.   Psychiatric:         Mood and Affect: Mood normal.               ED Course     Labs Reviewed   [facility] POCT URINALYSIS DIPSTICK - Abnormal; Notable for the following components:       Result Value    Urine Clarity Cloudy (*)     Blood, Urine Trace-Intact (*)     Leukocyte esterase urine Trace (*)     All other components within normal limits   POCT PREGNANCY URINE - Normal   POCT ISTAT CHEM8 CARTRIDGE - Normal   POCT CBC   URINE CULTURE, ROUTINE                      MDM      The differential includes the following  Strangulated or incarcerated hernia, renal colic less likely, appendicitis,    Pertinent comorbidities include  As detailed above  Pertinent social history includes  As detailed above    ER course  Patient is afebrile hemodynamically stable.  Exam is notable for tenderness in the right lower abdominal region.  I am unable to palpate a hernia but this could be secondary to the patient's slight pannus.  At this time patient does not want any IV analgesia  or oral analgesia.  Will obtain labs as well as a CT of the abdomen pelvis to further elucidate etiology of patient's symptoms.    Labs  UA with trace blood and trace leukocyte Estrace however nitrite negative and no other signs convincing of UTI however culture will be sent.      Imaging studies  I personally reviewed the following radiology study CT scan and my independent interpretation is it  showed no episode of abdominal mass.    CT ABDOMEN+PELVIS(CONTRAST ONLY)(CPT=74177)    Result Date: 1/20/2024  CONCLUSION:  1. No acute abdominal-pelvic abnormality. 2. Small fat containing right groin/inguinal hernia without new inflammatory changes. 3. Details as above.  Continued clinical correlation recommended.    LOCATION:  [facility]   Dictated by (CST): Martin Kruger MD on 1/20/2024 at 11:55 AM     Finalized by (CST): Martin Kruger MD on 1/20/2024 at 12:00 PM        Discussed results with the patient and recommend follow-up with PCP.                                     Medical Decision Making      Disposition and Plan     Clinical Impression:  1. Unilateral recurrent inguinal hernia without obstruction or gangrene         Disposition:  Discharge  1/20/2024 12:11 pm    Follow-up:  Andre Ruiz MD  [address] [place] [address] [id number] [phone]                Medications Prescribed:  Current Discharge Medication List

## 2024-01-27 DIAGNOSIS — Z51.81 ENCOUNTER FOR THERAPEUTIC DRUG MONITORING: ICD-10-CM

## 2024-01-27 DIAGNOSIS — E66.9 CLASS 1 OBESITY WITH SERIOUS COMORBIDITY AND BODY MASS INDEX (BMI) OF 31.0 TO 31.9 IN ADULT, UNSPECIFIED OBESITY TYPE: ICD-10-CM

## 2024-01-27 NOTE — TELEPHONE ENCOUNTER
Requesting   Requested Prescriptions     Pending Prescriptions Disp Refills    BUPROPION  MG Oral Tablet 24 Hr [Pharmacy Med Name: BUPROPION HCL  MG TABLET] 90 tablet 0     Sig: TAKE 1 TABLET BY MOUTH EVERY DAY IN THE MORNING     LOV: 11/21/23  RTC: 3 months  Filled: 11/21/23 #30 with 2 refills    Future Appointments   Date Time Provider Department Center   2/7/2024 11:00 AM Ranjan Hodge RD [facility]

## 2024-01-28 RX ORDER — BUPROPION HYDROCHLORIDE 150 MG/1
150 TABLET ORAL EVERY MORNING
Qty: 90 TABLET | Refills: 0 | Status: SHIPPED | OUTPATIENT
Start: 2024-01-28

## 2024-03-15 RX ORDER — LEVONORGESTREL / ETHINYL ESTRADIOL AND ETHINYL ESTRADIOL 150-30(84)
1 KIT ORAL DAILY
Qty: 91 TABLET | Refills: 0 | Status: SHIPPED | OUTPATIENT
Start: 2024-03-15

## 2024-04-05 ENCOUNTER — OFFICE VISIT (OUTPATIENT)
Facility: CLINIC | Age: 49
End: 2024-04-05
Payer: COMMERCIAL

## 2024-04-05 VITALS
HEIGHT: 64.5 IN | HEART RATE: 98 BPM | WEIGHT: 167 LBS | BODY MASS INDEX: 28.16 KG/M2 | SYSTOLIC BLOOD PRESSURE: 118 MMHG | DIASTOLIC BLOOD PRESSURE: 68 MMHG

## 2024-04-05 DIAGNOSIS — Z12.4 CERVICAL CANCER SCREENING: ICD-10-CM

## 2024-04-05 DIAGNOSIS — Z01.419 ENCOUNTER FOR WELL WOMAN EXAM WITH ROUTINE GYNECOLOGICAL EXAM: Primary | ICD-10-CM

## 2024-04-05 DIAGNOSIS — Z12.31 BREAST CANCER SCREENING BY MAMMOGRAM: ICD-10-CM

## 2024-04-05 PROCEDURE — 99396 PREV VISIT EST AGE 40-64: CPT | Performed by: OBSTETRICS & GYNECOLOGY

## 2024-04-05 PROCEDURE — 88175 CYTOPATH C/V AUTO FLUID REDO: CPT | Performed by: OBSTETRICS & GYNECOLOGY

## 2024-04-05 PROCEDURE — 87624 HPV HI-RISK TYP POOLED RSLT: CPT | Performed by: OBSTETRICS & GYNECOLOGY

## 2024-04-05 RX ORDER — LEVONORGESTREL / ETHINYL ESTRADIOL AND ETHINYL ESTRADIOL 150-30(84)
1 KIT ORAL DAILY
Qty: 91 TABLET | Refills: 3 | Status: SHIPPED | OUTPATIENT
Start: 2024-04-05

## 2024-04-05 NOTE — PROGRESS NOTES
Btehany Barriga is a 48 year old female  No LMP recorded. (Menstrual status: Continuous Pill).   Chief Complaint   Patient presents with    Wellness Visit   .Patient c/o thinning hair for the past several years, discussed minoxidil lotion - OTC  Would like to continue OCP  OBSTETRICS HISTORY:  OB History    Para Term  AB Living   2 2 2     2   SAB IAB Ectopic Multiple Live Births           2      # Outcome Date GA Lbr Aaron/2nd Weight Sex Delivery Anes PTL Lv   2 Term 08 40w0d   M NORMAL SPONT   DAI   1 Term 99 40w0d   M NORMAL SPONT   DAI       GYNE HISTORY:  Periods absent    History   Sexual Activity    Sexual activity: Not Currently    Birth control/ protection: OCP        Pap Date: 23  Pap Result Notes: Negative        MEDICAL HISTORY:  Past Medical History:   Diagnosis Date    Abdominal hernia ,     Left inguinal hernia repaired in  and i currently have a right inguinal hernia diagnosed in  but not repaired yet    Blood in urine 2019?    Microscopic amounts noted in routine urine samples over the last few years    Calculus of kidney During ultrasound     2 small stones noted    Change in hair     Hair loss    Decorative tattoo 1995    Diarrhea, unspecified     During heavy antibiotic use and now very often after removal of gallbladder     Esophageal reflux     Frequent bowel movements     Heartburn     Hemorrhoid     Hemorrhoids 2000    High blood pressure     Human papilloma virus infection     20 year ago per pt    Pain in joints 2012ish    Knees    Pap smear for cervical cancer screening , , 10/12, 10/11, 09/10,     negative hpv negative    PONV (postoperative nausea and vomiting)     Psoriasis     Stool incontinence     During heavy antibiotic use and now occasionally since gallbladder removal     Stress     I have a young adult son with severe mental health problems    Vertigo     Vertigo     Visual impairment      glasses    Wears glasses 1994       SURGICAL HISTORY:  Past Surgical History:   Procedure Laterality Date    Cholecystectomy  6/21    Colposcopy,bx cervix/endocerv curr  08/30/2021    Hernia surgery  10/01/2003    Left    Laparoscopic cholecystectomy  06/18/2021    Nasal scopy,remv part ethmoid  05/14/2021    Nasal scopy,rmv tiss maxill sinus Left 05/14/2021    Oral surgery      Other surgical history      Removal of ovarian cyst(s)  2007       SOCIAL HISTORY:  Social History     Socioeconomic History    Marital status:      Spouse name: Not on file    Number of children: Not on file    Years of education: Not on file    Highest education level: Not on file   Occupational History    Not on file   Tobacco Use    Smoking status: Never     Passive exposure: Never    Smokeless tobacco: Never   Vaping Use    Vaping Use: Never used   Substance and Sexual Activity    Alcohol use: No    Drug use: No    Sexual activity: Not Currently     Birth control/protection: OCP   Other Topics Concern     Service Not Asked    Blood Transfusions Not Asked    Caffeine Concern No    Occupational Exposure Not Asked    Hobby Hazards Not Asked    Sleep Concern Not Asked    Stress Concern No    Weight Concern No    Special Diet Not Asked    Back Care Not Asked    Exercise Yes     Comment: Usually only once or twice per week    Bike Helmet Not Asked    Seat Belt Yes    Self-Exams Not Asked   Social History Narrative    Not on file     Social Determinants of Health     Financial Resource Strain: Not on file   Food Insecurity: Not on file   Transportation Needs: Not on file   Physical Activity: Not on file   Stress: Not on file   Social Connections: Not on file   Housing Stability: Not on file       FAMILY HISTORY:  Family History   Problem Relation Age of Onset    Heart Disorder Father     Other (HTN) Father     Diabetes Father     Hypertension Father         Both parents    Other (HTN) Mother     Diabetes Maternal Grandfather      Breast Cancer Paternal Grandmother 40        In her 40's    Cancer Maternal Uncle         esophageal dx age 50s    Cancer Maternal Uncle         bile ducts possble liver, dx age 60s    Prostate Cancer Maternal Uncle         dx age 50s    Ovarian Cancer Neg     Uterine Cancer Neg     Pancreatic Cancer Neg     Colon Cancer Neg     Infertility Neg     Endometriosis Neg        MEDICATIONS:    Current Outpatient Medications:     Levonorgest-Eth Estrad 91-Day 0.15-0.03 &0.01 MG Oral Tab, Take 1 tablet by mouth daily., Disp: 91 tablet, Rfl: 3    buPROPion  MG Oral Tablet 24 Hr, Take 1 tablet (150 mg total) by mouth every morning., Disp: 90 tablet, Rfl: 0    lisinopril 10 MG Oral Tab, Take 1 tablet (10 mg total) by mouth daily., Disp: 90 tablet, Rfl: 1    Phentermine HCl 30 MG Oral Cap, Take 1 capsule (30 mg total) by mouth every morning., Disp: 30 capsule, Rfl: 1    ketoconazole 2 % External Shampoo, , Disp: , Rfl:     mometasone 0.1 % External Ointment, , Disp: , Rfl:     Ferrous Sulfate ER (SLOW FE) 142 (45 Fe) MG Oral Tab CR, Take by mouth every other day., Disp: , Rfl: 0    Cholecalciferol (VITAMIN D) 25 MCG (1000 UT) Oral Tab, Take 1,000 Units by mouth daily.  , Disp: , Rfl:     Fluocinonide 0.05 % External Solution, Apply once daily as needed.  Max 2 weeks., Disp: 60 mL, Rfl: 2    ALLERGIES:    Allergies   Allergen Reactions    Omnicef [Cefdinir] FACE FLUSHING    Penicillins FACE FLUSHING     Redness all over body, digestive issues and incontinence          Review of Systems:  Constitutional:  Denies fatigue, night sweats, hot flashes  Eyes:  denies blurred or double vision  Cardiovascular:  denies chest pain or palpitations  Respiratory:  denies shortness of breath  Gastrointestinal:  denies heartburn, abdominal pain, diarrhea or constipation  Genitourinary:  denies dysuria, incontinence, abnormal vaginal discharge, vaginal itching  Musculoskeletal:  denies back pain.  Skin/Breast:  Denies any breast pain,  lumps, or discharge.   Neurological:  denies headaches, extremity weakness or numbness.  Psychiatric: denies depression or anxiety.  Endocrine:   denies excessive thirst or urination.  Heme/Lymph:  denies history of anemia, easy bruising or bleeding.      PHYSICAL EXAM:   Constitutional: well developed, well nourished  Head/Face: normocephalic  Neck/Thyroid: thyroid symmetric, no thyromegaly, no nodules, no adenopathy  Lymphatic:no abnormal supraclavicular or axillary adenopathy is noted  Breast: normal without palpable masses, tenderness, asymmetry, nipple discharge, nipple retraction or skin changes  Abdomen:  soft, nontender, nondistended, no masses  Skin/Hair: no unusual rashes or bruises  Extremities: no edema, no cyanosis  Psychiatric:  Oriented to time, place, person and situation. Appropriate mood and affect    Pelvic Exam:  External Genitalia: normal appearance, hair distribution, and no lesions  Urethral Meatus:  normal in size, location, without lesions and prolapse  Bladder:  No fullness, masses or tenderness  Vagina:  Normal appearance without lesions, no abnormal discharge  Cervix:  Normal without tenderness on motion  Uterus: normal in size, contour, position, mobility, without tenderness  Adnexa: normal without masses or tenderness  Perineum: normal  Anus: no hemorroids     Assessment & Plan:  Diagnoses and all orders for this visit:    Encounter for well woman exam with routine gynecological exam    Cervical cancer screening  -     Hpv High Risk , Thin Prep Collect; Future  -     ThinPrep PAP Smear B; Future    Breast cancer screening by mammogram  -     [facility] INDIGO 2D+3D SCREENING BILAT (CPT=77067/93884); Future    Other orders  -     Levonorgest-Eth Estrad 91-Day 0.15-0.03 &0.01 MG Oral Tab; Take 1 tablet by mouth daily.

## 2024-04-08 LAB — HPV I/H RISK 1 DNA SPEC QL NAA+PROBE: NEGATIVE

## 2024-04-11 LAB
.: NORMAL
.: NORMAL

## 2024-04-16 NOTE — PROGRESS NOTES
Bethany Barriga is a 48 year old female presents today for follow-up on medical weight loss program for the treatment of overweight, obesity, or morbid obesity with associated HTN, IR.    S:  Current weight   Wt Readings from Last 6 Encounters:   04/17/24 164 lb (74.4 kg)   04/05/24 167 lb (75.8 kg)   01/20/24 180 lb (81.6 kg)   01/11/24 179 lb (81.2 kg)   12/19/23 179 lb 3.2 oz (81.3 kg)   12/05/23 183 lb (83 kg)    AND BMI Body mass index is 27.72 kg/m²..    Patient has lost 21# since LOV via VV in 11/2023 and in 9/2023 via clinic.  She has been compliant with therapy. She is feeling good about weight loss. Less cravings and increased clean eating. Researching about food and the power it has to support our health. Has noticed hair thining/loss over the past few months. Admits she has not been consistent with Vitamin B12 or iron supplement that she is supposed to be taking.    Testing/consult completed since LOV: Dietician: yes, note reviewed in EMR. Ranjan 7/12/23: Estimated caloric needs for weight loss: 1600 cals/d for 1 pounds/week weight loss.  Labs: yes, reviewed in EMR.    Labs: Vitamin B12 deficiency     [facility] Medical Weight Loss Follow Up    Question 4/16/2024  4:26 PM CDT - Filed by Patient   Please describe a success moment:    Please describe a challenging moment/needs for improvement:    Please complete this 24 hour food journal, listing everything you had to eat in the past day. Include the average time of day you ate these meals at    List foods, qty and prep for breakfast: 2 eggs, avocado toast   List foods, qty and prep for lunch. oranges   List foods, qty and prep for dinner. veggies   List foods, qty and prep for snacks.    List the types and qty of fluids consumed    On average, how many meals did you eat out per week?    Exercise    How many days per week are you active or exercise 2   On average, how many days were anaerobic (strength/resistance) exercises performed? 0   On average, how many days  were aerobic (cardio) exercises performed? 2   Perceived level of exertion on a scale of 1-5, with 5 being very intense: 2   Stress    Average stress level on a scale of 1-10, with 10 being extremely stressed: 7   If greater than 5/1O how would you grade your coping mechanisms? moderate   Sleep hours and integrity    How many hours of uninterrupted sleep do you get a night: 4   Do you feel rested in the morning: Yes   If no, what may have been disrupting your sleep? I get woken up by pets but im able to go back to sleep   Please list any goal(s) for your next visit Discuss current meds and the possibility that one or both are contributing to a big increase in hair loss over the last 5 or 6 months       Social hx and PMH reviewed. 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    REVIEW OF SYSTEMS:  GENERAL: feels well otherwise  LUNGS: denies shortness of breath with exertion  CARDIOVASCULAR: denies chest pain on exertion, denies palpitations or pedal edema  GI: denies abdominal pain.  No N/V/D/C  MUSCULOSKELETAL: no acute joint or muscle pain  NEURO: denies headaches  PSYCH: denies change in behavior or mood, denies feeling sad or depressed    EXAM:  /74   Pulse 88   Resp 16   Ht 5' 4.5\" (1.638 m)   Wt 164 lb (74.4 kg)   BMI 27.72 kg/m²   GENERAL: well developed, well nourished, in no apparent distress, obese  HEENT: no patchy hair loss noted  EYES: conjunctiva pink, sclera non icteric, PERRLA  LUNGS: CTA in all fields, breathing non labored  CARDIO: RRR without murmur, normal S1 and S2 without clicks or gallops, no pedal edema.  GI: +BS  NEURO/MS: motor and sensory grossly intact  PSYCH: pleasant, cooperative, normal mood and affect    ASSESSMENT AND PLAN:  Reviewed Initial Weight Data and Goal Weight Loss:       Encounter Diagnoses   Name Primary?    Encounter for therapeutic drug monitoring Yes    Overweight (BMI 25.0-29.9)     Primary hypertension     Insulin resistance      Gastroesophageal reflux disease, unspecified whether esophagitis present     History of obesity        No orders of the defined types were placed in this encounter.      Meds & Refills for this Visit:  Requested Prescriptions     Signed Prescriptions Disp Refills    Phentermine HCl 30 MG Oral Cap 30 capsule 3     Sig: Take 1 capsule (30 mg total) by mouth every morning.    buPROPion  MG Oral Tablet 24 Hr 90 tablet 1     Sig: Take 1 tablet (150 mg total) by mouth every morning.       Imaging & Consults:  None      Plan:  Patient has lost 21# since LOV in 9/2023 on phentermine 30 mg daily, Wellbutrin  mg daily with a total weight loss of 29# since initial consult on 6/22/23 with initial weight of 193#.  Weight loss goal: To move more and eat less in 6 months and To weigh 25 lbs less- goal met and feel better in 1 year. Next goal 150#. CPM. Hx of naltrexone with SEs of visual changes. Topamax with SEs of menstrual irregularities.  on hair loss with weight loss, resources for support and fitness. See patient instructions below for additional plans and patient counseling.      Patient Instructions   Continue making lifestyle changes that focus on good nutrition, regular exercise and stress management.    Medication Plan: Continue current medication regimen.    Next steps to work on before next office visit include: Great work in making healthy lifestyle changes! Continue to maintain the mentality of Food 4 Fuel. Eating clean, lean and green with working towards 85% of your food intake from plant based sources and reducing processed foods. Next goal is 150#. Adding resistance/strength training to your fitness will help build muscle and boost metabolism.    Developing a balanced fitness routine takes time. Begin to build the mentality of fitness 4 function! Start gradually and safely to continue to lose and maintain weight loss, build strength and prevent injury. Fitness not only supports a healthy body,  but also a healthy mind with reducing stress and lifting your mood. I recommend a routine of 3x/week of cardio with varying intensity, 3x/week of strength training and 1x/week of stretching/flexibility/balance- such as Yoga.  Three ingredients necessary for making a habit of exercise for a lifetime includes: convenience, budget friendly and most importantly FUN! Changing up your exercise routine seasonally can keep you motivated and expose you to new interests and challenges! Step outside your comfort zone and give it a try, you never know where the challenge will lead you and what changes you may see!    Learning to Apply the FITT Principle to Your Exercise Plan  One of the biggest challenges with exercise is knowing where to start and how to get better. To improve your fitness, you must self-monitor your workouts and make changes when necessary. One of the best tools you can use to help you is the FITT Principle.  FITT is an acronym that outlines the basic components of a successful exercise plan.  Frequency - How often you exercise  Intensity - How hard you exercise  Time - How long you exercise  Type - What kind of exercise you do  How Often Should You Exercise?  It is a myth that you must work out for extended periods every day to lose weight and keep it off. What is considered an “effective” exercise varies between people. Factors that affect this include age, fitness level, mobility, health conditions, etc.  Before you begin an exercise plan and decide how often to exercise, consider these key factors.  What is your current fitness level? What are you able to do?  What is your schedule? How much time do you have to exercise?  What health and fitness goals do you want to achieve?  Build your plan off of the answers to these questions. If you are a busy mom and you want to lose weight to gain more energy, you might not have a lot of time. Maybe your only form of exercise is keeping up with your kids. In this  case, you may want to start small. For example, you could plan workouts for weekend afternoons when your spouse can watch the kids.  How Hard Should You Exercise?  The best way to see how hard you are working is to monitor your heart rate. You can do this by wearing a fitness tracker, heart rate monitor or smart watch. You can also feel for your heartbeat and count it over a 15-second period.  Low-intensity - An activity level you can continue for a long time (walking)  Moderate-intensity - An activity level that will boost your heart rate and require effort to maintain (biking)  High-intensity - An activity level that feels like an all-out effort. Your heart rate is high and you can't speak complete sentences between breaths  How Long Should You Exercise?  The time you spend exercising will usually depend on what you are doing. Health experts recommend at least 30 minutes of cardio exercise each workout. However, if you are doing a strength-based exercise, you will likely pay more attention to your number of “sets” and “reps.” Regardless, many other factors are involved.  The amount of time you have  How long it takes you to feel fatigued  Weather, time of day  Health conditions  What Should You Do for Exercise?  Should you hit up the elliptical at the gym? Should you go for a hike? The type of exercise you do depends on what you like and what results you want.  For example, if you want to improve your cardio-vascular fitness and you love the outdoors, try exercises like hiking, swimming and biking. If you want to improve your muscle strength and you enjoy the convenience of the gym, try using free weights or machine weights. You can also use your body weight for exercises like push-ups, chin-ups, planks, etc.  The FITT Principle: Final Considerations  You can use the FITT Principle for cardio exercise, strength-based exercise, stretching and more. However, before you start any exercise plan, first consult with  your healthcare provider. They will help you develop a plan that is safe and effective. By using the FITT Principle, you can not only improve your fitness level with time, but you can also prevent serious injury.    Build Muscle & Lose Fat  The great fat vs muscle diagram below paints a clear picture of why it’s so important for you to build muscle in order to lose fat.  Maybe you’ve wondered about muscle vs fat and why you need to build muscle to lose fat, look slim and keep the inches off.  Well, look no further! With the fat vs muscle diagram below you’ll see why healthy permanent weight loss requires you to build muscle to lose fat.  Fat vs Muscle Diagram  The facts are clear. The best way to lose fat and look slimmer is to build muscle. Since one picture’s worth a thousand words, here’s 5 lbs of muscle vs fat of the same weight. Notice 5 lbs of fat is three times bigger.    This means that if you were to build 5 lbs of muscle and lose 5 lbs of fat, you would weigh exactly the same, but look smaller and firmer.  So imagine if it were 25 lbs or 50 lbs of lost fat vs muscle gained.  This is why it’s possible for you to lose fat inches when exercising, yet show no change in scale weight. And can you see how firm the muscle looks compared to the lumpy, tapioca pudding consistency of fat?  Build Muscle to Lose Fat Benefits  Although daily physical activity, like walking, swimming and aerobics are essential to good heart health and weight management, combining muscle building weight training with cardiovascular exercise, gives you an unbeatable combination to lose fat and keep it off permanently.  Of course it takes a few weeks before you see any measurable changes. But you’ll start to build muscle, lose fat and burn more calories from the moment you begin weight training. Building muscle helps you:  1. Burn more calories. Unlike fat, muscle beefs up your metabolism to help you burn about 70% more calories than fat  can.  2. Improve appearance. When done properly, strength training can greatly improve your posture and help to prevent joint pain.  3. Build confidence. Strong muscles and joints increase your level of confidence in your abilities to perform many lifestyle activities.  4. Prevent injury. Strength training can build stronger muscles and more limber, flexible joints, which play a crucial role in preventing injury.  5. Increase bone density. Weight bearing activities improve your bone density and reduce bone loss. This helps to prevent osteoporosis.  Studies show that weight training combined with aerobics and stretching is the best way to build a strong, firm body and keep it that way.  So, if you want to look and feel better than ever, you need to build muscle to lose fat.     Taken from www.YourEncore.Collective IP by Philip Martinez      Weight-loss Surgery, Nutrition and Hair Loss    by GASTON Dunn at www.obesityaction.org Winter 2011 Resource    Typically, about 90 percent of hairs are anagen (in a growth phase) and 10 percent are telogen (in a dormant or resting phase) at any given time, meaning you are usually losing a lot less hair than you are growing so you don’t have noticeable hair loss. But sometimes this can change.    A common fear and complaint of bariatric surgery patients is post-operative hair loss. While for most of us as people, our hair is an important part of our self-image and body image, it is not very important to our bodies. For this reason, nutrition can have a great impact on hair health because when forced to make a choice, the body will shift nutritional stores to vital organs like your brain and heart and away from your hair.    Hair loss has many causes. The most common type of hair loss after weight-loss surgery is a diffuse loss known medically as telogen effluvium, which can have both nutritional and non-nutritional causes.    Growing and Losing Hair  Whether you are  aware of it or not, for most of your life you are always in the process of both growing and losing hair. Human hair follicles have two states; anagen, a growth phase, and telogen, a dormant or resting phase. All hairs begin their life in the anagen phase, grow for some period of time, and then shift into the telogen phase which lasts for about 100 to 120 days. Following this, the hair will fall out.    Specific types of stress can result in a shift of a much greater percentage of hairs into the telogen phase. The stressors known to result in this shift, or telogen effluvium, include:    High fever  Severe infection  Major surgery  Acute physical trauma  Chronic debilitating illness (such as cancer or end-stage liver disease)  Hormonal disruption (such as pregnancy, childbirth or discontinuation of estrogen therapy)  Acute weight-loss  Crash dieting  Anorexia  Low protein intake  Iron or zinc deficiency  Heavy metal toxicity  Some medications (such as beta-blockers, anticoagulants, retinoids and immunizations)  Weight-loss Surgery and Hair Loss    Hair loss rarely lasts for more than six months in the absence of a dietary cause. Because hair follicles are not damaged in telogen effluvium, hair should then regrow. For this reason, most doctors can assure their weight-loss surgery patients and those with rapid weight loss that with time and patience, and keeping up good nutritional intake, their hair will grow back. Discrete nutritional deficiencies are known to cause and contribute to telogen effluvium. One would be more suspicious of a nutritional contribution to post-bariatric surgery hair loss if:    Hair loss continued more than one year after surgery  Hair loss started more than six months after surgery  Patient has had difficulty eating and/or has not complied with supplementation  Patient has demonstrated low values of ferritin, zinc or protein  Patient has had more rapid than expected weight-loss  Other  symptoms of deficiency are present    Nutrition Iron  Iron is the single nutrient most highly correlated with hair loss. The correlation between non-anemic iron deficiency and hair loss was first described in the early 1960s, although little to no follow-up research was conducted until this decade. While new research is conflicted as to the significance of ferritin as a diagnostic tool in hair loss, it has still been found that a significant number of people with telogen effluvium respond to iron therapy.    Optimal iron levels for hair health have not been established, although there is some good evidence that a ferritin level below 40ug/L is highly associated with hair loss in women.1 It is worth noting that this is well above the level that is considered to be anemia, so doctors would not be expected to see this as a deficiency.    Protein  Low protein intake is associated with hair loss.     Research also indicates that low levels of the amino acid l-lysine can contribute to hair loss and that repletion of lysine stores may both improve iron status and hair regrowth. In a study of anemic patients with hair loss who were supplemented with 1.5 to 2 grams of l-lysine in addition to their iron therapy, ferritin levels increased more substantially over iron therapy alone.    Many individuals believe that supplementing with or topically applying the nutrient biotin will either help to prevent hair loss or will improve hair regrowth. To date, there is no science that would support either of these presumptions. While biotin deficiency can cause dermatitis, hair loss is only known to occur in experimentally induced states in animal models or in extreme cases of prolonged diets composed exclusively of egg whites.    Other  Other nutrients associated with hair health include vitamin A, inositol, folate, B-6 and essential fatty acids. Hair loss can also be caused by systemic diseases, including thyroid disease and  polycystic ovarian syndrome (PCOS) and is influenced by genetics.    Conclusion  Hair loss can be distressing and many will try nutrition changes themselves to see if they can prevent it. Unfortunately, there is little evidence that early hair loss is preventable because it is mostly likely caused by rapid weight-loss.    Later hair loss, however, can be indicative of a nutritional problem, especially iron deficiency, and may be a clinically useful sign.    Patient Resources:    Personal Training/Fitness Classes/Health Coaching    [facility] in [place]: Full fitness center with group fitness and personal training located in [place].  Health Coaching with Adrianna Calvo, Gregory Rojas, and Ernesto Hester at our [facility]- individual coaching to work on your health goals. Call [phone] and/or email @ [email or web address]. Free 60 minute consult when client of [facility].  [address] [place] [address] @ http://www.Ecrebo. A variety of group fitness options plus various yoga classes [phone] and/or email Patience at [email or web address]  [facility] F[facility] Centers with multiple locations: [employer] (www.Efficiency Exchange), F45 Training (www.q46nztxnkmrZynga), ShareRoot Body Bootcamp (www.Virdocs Softwarep.Lelong), InMyRoom (www.Appdra), The Exercise  (www.exercisecoach.com), Club Pilates (www.clubpilates.com)    Online Fitness  Fitness  on Utube  Fit in 10 DVD series   www.dpikj10PIH.Lelong  Chair exercises via Sit and Be Fit (www.sitandbefit.org) and Twenga (www.TwoTen.com) or Arturo Crawley or Bi Perez videos on YouTube.  Hip Hop Fit with Otf Spears at www.hiphopfit.net    Apps for on the Go Fitness  [place] 7 Minute Workout (orange box with white 7) - free on the go HIIT training yossi  Enzo Yossi @ www.onepeloton.com    Nutrition Trackers and Programs  LoseIT! And My Fitness Pal apps and on line  for tracking nutrition  NOOM - virtual health coaching  FitFoundation (healthy meals on the go) in [place] @ www.lmyjoesfpwbqv0h.Pressi  Shane ROWE @ www.bistrSonitus TechnologiesdPetcube and Fdklgn34 (calorie smart and low carb plans recommended) @ www.rdrmyp52.com, Metabolic Meals @ www.MyMetabolicMeals.com - individual prepared meals to go  Gobble, Blue Apron, Home , Every Plate, Sunbasket- on line meal delivery programs for preparation at home  Meal Village in [place] for homemade meals to go @ www.mealvillage.Pressi  Diet Doctor @ www.dietdoctor.com - low carb swaps  YuPrecipio Diagnostics - meal prep and planning nitza (www.yummly.com)    Stress, Anxiety, Depression, Trauma  CALM meditation nitza (www.calm.com)  Headspace  Don't let anxiety run your life. Using the science of emotion regulation and mindfulness to overcome fear and worry by Dar Tripp PsyD and Sarthak Helms MA.  The [employer] Podcast (September 27, 2023): 6 Magic Words That Stop Anxiety  What Happened to You?- a look at the impact trauma has on behavior written by Zackery Oliver and Dr. Ruddy Miller  Whole Again by Ilya Mackenzie - discovering your true self after trauma    Mindful Eating/The Hungry Brain  Am I Hungry? Mindful eating virtual  nitza (www.amihungry.com)  The Hungry Brain by Zoila Malloy, PhD  Mindless Eating by Dirk Martin  Weight Loss Surgery Will Not Treat Food Addiction by Ghada Mora Ph.D    Metabolic Dysfunction, Hormones and Cravings  Why We Get Sick? By Hussein Morrison (insulin resistance)  Your Body in Balance: The New Science of Food, Hormones, and Health by Dr. Koby Antunez  The Complete Guide to fasting by Dr. Pratt  Fast Like a Girl by Dr. Julia Gutiérrez  The Menopause Reset by Dr. Julia Gutiérrez  Sugar, Salt & Fat by Eneida Martinez, Ph.D, R.D.  The Truth About Sugar - documentary on sugar (Free on Utube, https://youtu.be/8T8hdvmAQ2b)  Reverse Visceral Fat: #1 Way to Increase Your Lifespan & End Inflammation with Dr. Paul Claros on [employer] in [place] @  https://youtu.be/nupPRnvUpJY?si=fs7gmnKgOYB8YqzS    Nutrition Support  You Are What You Eat - Netfix series on twin study looking at impact of nutrition changes on health  The End of Dieting: How to Live for Life by Dr. Emanuel De La Torre M.D. or listen to The [place] Podcast Episode 63: Understanding \"Nutritarian\" Eating w/Dr. Emanuel De La Torre  The Game Changers- Netflix Documentary on plant based nutrition  The Dr. Carroll T5 Wellness Plan by Dr. Gonzalo Carroll MD  The Complete Guide to fasting by Dr. Pratt  @[facility] ([facility] Dietician with support surrounding nutrition and meal prep/planning)    Education, Motivation and Support Resources  Live to 100: Secrets of the Blue Zones - Netflix series on the secrets to communities living over 100 years old  Atomic Habits by Edvin Licona (a book about taking small steps to promote greater behavior change)   Motivation nitza (black box with white \")- daily supportive messages sent to your phone  Can't Hurt Me by Dar Rogers (a book exploring the power of discipline in achieving your goals)  Fed Up - documentary about obesity (Free on Utube)  Www.yourweightmatters.org - Obesity Action Coalition sponsored Blog posts  Obesity Action Coalition Resources on topics specific to weight management (www.obesityaction.org)  Fitlosophy Fitspiration - journal to better health (journal book found at [employer] in fitness aisle)  Martinez Crawford talk titled: The Call to Courage (Netflix)  The Exam Room by the Physician's Committee (Podcast)  Nutrition Facts by Dr. Garcia (Podcast)        Medication use and SEs reviewed with patient.    Return in about 3 months (around 7/17/2024) for weight management via clinic or VV.    Patient verbalizes understanding.      Answers submitted by the patient for this visit:  Medical Weight Loss Follow Up (Submitted on 4/16/2024)  If greater than 5/1O how would you grade your coping mechanisms?: moderate

## 2024-04-17 ENCOUNTER — OFFICE VISIT (OUTPATIENT)
Dept: INTERNAL MEDICINE CLINIC | Facility: CLINIC | Age: 49
End: 2024-04-17
Payer: COMMERCIAL

## 2024-04-17 VITALS
RESPIRATION RATE: 16 BRPM | BODY MASS INDEX: 27.66 KG/M2 | DIASTOLIC BLOOD PRESSURE: 74 MMHG | HEIGHT: 64.5 IN | HEART RATE: 88 BPM | WEIGHT: 164 LBS | SYSTOLIC BLOOD PRESSURE: 122 MMHG

## 2024-04-17 DIAGNOSIS — Z51.81 ENCOUNTER FOR THERAPEUTIC DRUG MONITORING: Primary | ICD-10-CM

## 2024-04-17 DIAGNOSIS — K21.9 GASTROESOPHAGEAL REFLUX DISEASE, UNSPECIFIED WHETHER ESOPHAGITIS PRESENT: ICD-10-CM

## 2024-04-17 DIAGNOSIS — E88.819 INSULIN RESISTANCE: ICD-10-CM

## 2024-04-17 DIAGNOSIS — I10 PRIMARY HYPERTENSION: ICD-10-CM

## 2024-04-17 DIAGNOSIS — Z86.39 HISTORY OF OBESITY: ICD-10-CM

## 2024-04-17 DIAGNOSIS — E66.3 OVERWEIGHT (BMI 25.0-29.9): ICD-10-CM

## 2024-04-17 PROCEDURE — 99213 OFFICE O/P EST LOW 20 MIN: CPT | Performed by: NURSE PRACTITIONER

## 2024-04-17 RX ORDER — BUPROPION HYDROCHLORIDE 150 MG/1
150 TABLET ORAL EVERY MORNING
Qty: 90 TABLET | Refills: 1 | Status: SHIPPED | OUTPATIENT
Start: 2024-04-17

## 2024-04-17 RX ORDER — PHENTERMINE HYDROCHLORIDE 30 MG/1
30 CAPSULE ORAL EVERY MORNING
Qty: 30 CAPSULE | Refills: 3 | Status: SHIPPED | OUTPATIENT
Start: 2024-04-17

## 2024-04-17 NOTE — PATIENT INSTRUCTIONS
Continue making lifestyle changes that focus on good nutrition, regular exercise and stress management.    Medication Plan: Continue current medication regimen.    Next steps to work on before next office visit include: Great work in making healthy lifestyle changes! Continue to maintain the mentality of Food 4 Fuel. Eating clean, lean and green with working towards 85% of your food intake from plant based sources and reducing processed foods. Next goal is 150#. Adding resistance/strength training to your fitness will help build muscle and boost metabolism.    Developing a balanced fitness routine takes time. Begin to build the mentality of fitness 4 function! Start gradually and safely to continue to lose and maintain weight loss, build strength and prevent injury. Fitness not only supports a healthy body, but also a healthy mind with reducing stress and lifting your mood. I recommend a routine of 3x/week of cardio with varying intensity, 3x/week of strength training and 1x/week of stretching/flexibility/balance- such as Yoga.  Three ingredients necessary for making a habit of exercise for a lifetime includes: convenience, budget friendly and most importantly FUN! Changing up your exercise routine seasonally can keep you motivated and expose you to new interests and challenges! Step outside your comfort zone and give it a try, you never know where the challenge will lead you and what changes you may see!    Learning to Apply the FITT Principle to Your Exercise Plan  One of the biggest challenges with exercise is knowing where to start and how to get better. To improve your fitness, you must self-monitor your workouts and make changes when necessary. One of the best tools you can use to help you is the FITT Principle.  FITT is an acronym that outlines the basic components of a successful exercise plan.  Frequency - How often you exercise  Intensity - How hard you exercise  Time - How long you exercise  Type - What  kind of exercise you do  How Often Should You Exercise?  It is a myth that you must work out for extended periods every day to lose weight and keep it off. What is considered an “effective” exercise varies between people. Factors that affect this include age, fitness level, mobility, health conditions, etc.  Before you begin an exercise plan and decide how often to exercise, consider these key factors.  What is your current fitness level? What are you able to do?  What is your schedule? How much time do you have to exercise?  What health and fitness goals do you want to achieve?  Build your plan off of the answers to these questions. If you are a busy mom and you want to lose weight to gain more energy, you might not have a lot of time. Maybe your only form of exercise is keeping up with your kids. In this case, you may want to start small. For example, you could plan workouts for weekend afternoons when your spouse can watch the kids.  How Hard Should You Exercise?  The best way to see how hard you are working is to monitor your heart rate. You can do this by wearing a fitness tracker, heart rate monitor or smart watch. You can also feel for your heartbeat and count it over a 15-second period.  Low-intensity - An activity level you can continue for a long time (walking)  Moderate-intensity - An activity level that will boost your heart rate and require effort to maintain (biking)  High-intensity - An activity level that feels like an all-out effort. Your heart rate is high and you can't speak complete sentences between breaths  How Long Should You Exercise?  The time you spend exercising will usually depend on what you are doing. Health experts recommend at least 30 minutes of cardio exercise each workout. However, if you are doing a strength-based exercise, you will likely pay more attention to your number of “sets” and “reps.” Regardless, many other factors are involved.  The amount of time you have  How long it  takes you to feel fatigued  Weather, time of day  Health conditions  What Should You Do for Exercise?  Should you hit up the elliptical at the gym? Should you go for a hike? The type of exercise you do depends on what you like and what results you want.  For example, if you want to improve your cardio-vascular fitness and you love the outdoors, try exercises like hiking, swimming and biking. If you want to improve your muscle strength and you enjoy the convenience of the gym, try using free weights or machine weights. You can also use your body weight for exercises like push-ups, chin-ups, planks, etc.  The FITT Principle: Final Considerations  You can use the FITT Principle for cardio exercise, strength-based exercise, stretching and more. However, before you start any exercise plan, first consult with your healthcare provider. They will help you develop a plan that is safe and effective. By using the FITT Principle, you can not only improve your fitness level with time, but you can also prevent serious injury.    Build Muscle & Lose Fat  The great fat vs muscle diagram below paints a clear picture of why it’s so important for you to build muscle in order to lose fat.  Maybe you’ve wondered about muscle vs fat and why you need to build muscle to lose fat, look slim and keep the inches off.  Well, look no further! With the fat vs muscle diagram below you’ll see why healthy permanent weight loss requires you to build muscle to lose fat.  Fat vs Muscle Diagram  The facts are clear. The best way to lose fat and look slimmer is to build muscle. Since one picture’s worth a thousand words, here’s 5 lbs of muscle vs fat of the same weight. Notice 5 lbs of fat is three times bigger.    This means that if you were to build 5 lbs of muscle and lose 5 lbs of fat, you would weigh exactly the same, but look smaller and firmer.  So imagine if it were 25 lbs or 50 lbs of lost fat vs muscle gained.  This is why it’s possible for  you to lose fat inches when exercising, yet show no change in scale weight. And can you see how firm the muscle looks compared to the lumpy, tapioca pudding consistency of fat?  Build Muscle to Lose Fat Benefits  Although daily physical activity, like walking, swimming and aerobics are essential to good heart health and weight management, combining muscle building weight training with cardiovascular exercise, gives you an unbeatable combination to lose fat and keep it off permanently.  Of course it takes a few weeks before you see any measurable changes. But you’ll start to build muscle, lose fat and burn more calories from the moment you begin weight training. Building muscle helps you:  1. Burn more calories. Unlike fat, muscle beefs up your metabolism to help you burn about 70% more calories than fat can.  2. Improve appearance. When done properly, strength training can greatly improve your posture and help to prevent joint pain.  3. Build confidence. Strong muscles and joints increase your level of confidence in your abilities to perform many lifestyle activities.  4. Prevent injury. Strength training can build stronger muscles and more limber, flexible joints, which play a crucial role in preventing injury.  5. Increase bone density. Weight bearing activities improve your bone density and reduce bone loss. This helps to prevent osteoporosis.  Studies show that weight training combined with aerobics and stretching is the best way to build a strong, firm body and keep it that way.  So, if you want to look and feel better than ever, you need to build muscle to lose fat.     Taken from www.Oppa.EZ-Ticket by Philip Martinez      Weight-loss Surgery, Nutrition and Hair Loss    by Agatha Elias ND    OAC at www.obesityaction.org Winter 2011 Resource    Typically, about 90 percent of hairs are anagen (in a growth phase) and 10 percent are telogen (in a dormant or resting phase) at any given time, meaning you  are usually losing a lot less hair than you are growing so you don’t have noticeable hair loss. But sometimes this can change.    A common fear and complaint of bariatric surgery patients is post-operative hair loss. While for most of us as people, our hair is an important part of our self-image and body image, it is not very important to our bodies. For this reason, nutrition can have a great impact on hair health because when forced to make a choice, the body will shift nutritional stores to vital organs like your brain and heart and away from your hair.    Hair loss has many causes. The most common type of hair loss after weight-loss surgery is a diffuse loss known medically as telogen effluvium, which can have both nutritional and non-nutritional causes.    Growing and Losing Hair  Whether you are aware of it or not, for most of your life you are always in the process of both growing and losing hair. Human hair follicles have two states; anagen, a growth phase, and telogen, a dormant or resting phase. All hairs begin their life in the anagen phase, grow for some period of time, and then shift into the telogen phase which lasts for about 100 to 120 days. Following this, the hair will fall out.    Specific types of stress can result in a shift of a much greater percentage of hairs into the telogen phase. The stressors known to result in this shift, or telogen effluvium, include:    High fever  Severe infection  Major surgery  Acute physical trauma  Chronic debilitating illness (such as cancer or end-stage liver disease)  Hormonal disruption (such as pregnancy, childbirth or discontinuation of estrogen therapy)  Acute weight-loss  Crash dieting  Anorexia  Low protein intake  Iron or zinc deficiency  Heavy metal toxicity  Some medications (such as beta-blockers, anticoagulants, retinoids and immunizations)  Weight-loss Surgery and Hair Loss    Hair loss rarely lasts for more than six months in the absence of a  dietary cause. Because hair follicles are not damaged in telogen effluvium, hair should then regrow. For this reason, most doctors can assure their weight-loss surgery patients and those with rapid weight loss that with time and patience, and keeping up good nutritional intake, their hair will grow back. Discrete nutritional deficiencies are known to cause and contribute to telogen effluvium. One would be more suspicious of a nutritional contribution to post-bariatric surgery hair loss if:    Hair loss continued more than one year after surgery  Hair loss started more than six months after surgery  Patient has had difficulty eating and/or has not complied with supplementation  Patient has demonstrated low values of ferritin, zinc or protein  Patient has had more rapid than expected weight-loss  Other symptoms of deficiency are present    Nutrition Iron  Iron is the single nutrient most highly correlated with hair loss. The correlation between non-anemic iron deficiency and hair loss was first described in the early 1960s, although little to no follow-up research was conducted until this decade. While new research is conflicted as to the significance of ferritin as a diagnostic tool in hair loss, it has still been found that a significant number of people with telogen effluvium respond to iron therapy.    Optimal iron levels for hair health have not been established, although there is some good evidence that a ferritin level below 40ug/L is highly associated with hair loss in women.1 It is worth noting that this is well above the level that is considered to be anemia, so doctors would not be expected to see this as a deficiency.    Protein  Low protein intake is associated with hair loss.     Research also indicates that low levels of the amino acid l-lysine can contribute to hair loss and that repletion of lysine stores may both improve iron status and hair regrowth. In a study of anemic patients with hair loss who  were supplemented with 1.5 to 2 grams of l-lysine in addition to their iron therapy, ferritin levels increased more substantially over iron therapy alone.    Many individuals believe that supplementing with or topically applying the nutrient biotin will either help to prevent hair loss or will improve hair regrowth. To date, there is no science that would support either of these presumptions. While biotin deficiency can cause dermatitis, hair loss is only known to occur in experimentally induced states in animal models or in extreme cases of prolonged diets composed exclusively of egg whites.    Other  Other nutrients associated with hair health include vitamin A, inositol, folate, B-6 and essential fatty acids. Hair loss can also be caused by systemic diseases, including thyroid disease and polycystic ovarian syndrome (PCOS) and is influenced by genetics.    Conclusion  Hair loss can be distressing and many will try nutrition changes themselves to see if they can prevent it. Unfortunately, there is little evidence that early hair loss is preventable because it is mostly likely caused by rapid weight-loss.    Later hair loss, however, can be indicative of a nutritional problem, especially iron deficiency, and may be a clinically useful sign.    Patient Resources:    Personal Training/Fitness Classes/Health Coaching    [facility] in [place]: Full fitness center with group fitness and personal training located in [place].  Health Coaching with Adrianna Calvo, Gregory Rojas, and Ernesto Hester at our [facility]- individual coaching to work on your health goals. Call [phone] and/or email @ [email or web address]. Free 60 minute consult when client of [facility].  WILLIAM Pisnao @ http://[email or web address]. A variety of group fitness options plus various yoga classes [phone] and/or email Patience at [email or web address]  [facility] Fitness Centers  with multiple locations: valuescope Fitness (www.Webydo..[employer]), F45 Training (www.w29wgpmvoni.[employer]), Fit Body Bootcamp (www.Tencho TechnologybodyboSongzap.[employer]), MyWebGrocer (www.Markit.[employer]), The Exercise  (www.exercisecoach.com), Club Pilates (www.clubpilates.[employer])    Online Fitness  Fitness  on Utube  Fit in 10 DVD series   www.dmlbf18BMR.[employer]  Chair exercises via Sit and Be Fit (www.sitandbefit.Oligasis) and [employer] (www.haystagg.com) or Arturo Crawley or Bi Perez videos on YouTube.  Hip Hop Fit with Otf Spears at www.hiphopfit.KeepGo    Apps for on the Go Fitness  Hornbeck 7 Minute Workout (orange box with white 7) - free on the go HIIT training yossi  Peloton Yossi @ www.onepeloton.com    Nutrition Trackers and Programs  LoseIT! And My Fitness Pal apps and on line for tracking nutrition  NOOM - virtual health coaching  FitFoundation (healthy meals on the go) in [place] @ www.wqbhbrscreuad9h.[employer]  Bistro MD @ www.bistromd.[employer] and Yzlple12 (calorie smart and low carb plans recommended) @ www.jtxusf91.com, Metabolic Meals @ www.MyMetabolicMeals.com - individual prepared meals to go  Gobble, Blue Apron, Home , Every Plate, Sunbasket- on line meal delivery programs for preparation at home  Meal [facility] in [place] for homemade meals to go @ www.mealvillage.com  Diet Doctor @ www.dietdoctor.com - low carb swaps  Yummly - meal prep and planning yossi (www.yummly.com)    Stress, Anxiety, Depression, Trauma  CALM meditation yossi (www.calm.com)  Headspace  Don't let anxiety run your life. Using the science of emotion regulation and mindfulness to overcome fear and worry by Dar Tripp PsyD and Sarthak Helms MA.  The Green Throttle Games Podcast (September 27, 2023): 6 Magic Words That Stop Anxiety  What Happened to You?- a look at the impact trauma has on behavior written by Zackery Oliver and Dr. Ruddy Miller  Whole Again by Ilya Mackenzie - discovering your true self after trauma    Mindful  Eating/The Hungry Brain  Am I Hungry? Mindful eating virtual  nitza (www.amihungry.com)  The Hungry Brain by Zoila Malloy, PhD  Mindless Eating by Dirk Martin  Weight Loss Surgery Will Not Treat Food Addiction by Ghada Mora Ph.D    Metabolic Dysfunction, Hormones and Cravings  Why We Get Sick? By Hussein Morrison (insulin resistance)  Your Body in Balance: The New Science of Food, Hormones, and Health by Dr. Koby Antunez  The Complete Guide to fasting by Dr. Pratt  Fast Like a Girl by Dr. Julia Gutiérrez  The Menopause Reset by Dr. Julia Gutiérrez  Sugar, Salt & Fat by Eneida Martinez, Ph.D, R.D.  The Truth About Sugar - documentary on sugar (Free on MyFrontSteps, https://GeoPoll.be/2W4wqtvTW2t)  Reverse Visceral Fat: #1 Way to Increase Your Lifespan & End Inflammation with Dr. Paul Claros on Utube @ https://GeoPoll.Innovashop.tv/nupPRnvUpJY?[email or web address]    Nutrition Support  You Are What You Eat - Netfix series on twin study looking at impact of nutrition changes on health  The End of Dieting: How to Live for Life by Dr. Emanuel De La Torre M.D. or listen to The M-DISC Podcast Episode 63: Understanding \"Nutritarian\" Eating w/Dr. Emanuel De La Torre  The Game Changers- Netflix Documentary on plant based nutrition  The Dr. Carroll T5 Wellness Plan by Dr. Gonzalo Carroll MD  The Complete Guide to fasting by Dr. Pratt  @meSignature Contracting ServicesMc[facility] ([facility] Dietician with support surrounding nutrition and meal prep/planning)    Education, Motivation and Support Resources  Live to 100: Secrets of the Blue Zones - Netflix series on the secrets to communities living over 100 years old  Atomic Habits by Edvin Licona (a book about taking small steps to promote greater behavior change)   Motivation nitza (black box with white \")- daily supportive messages sent to your phone  Can't Hurt Me by Dar Rogers (a book exploring the power of discipline in achieving your goals)  Fed Up - documentary about obesity (Free on MyFrontSteps)  Www.yourweightmatters.org - Obesity Action  Coalition sponsored Blog posts  Obesity Action Coalition Resources on topics specific to weight management (www.obesityaction.org)  Fitlosophy Fitspiration - journal to better health (journal book found at [employer] in fitness aisle)  Martinez Crawford talk titled: The Call to Courage (Netflix)  The Exam Room by the Physician's Committee (Podcast)  Nutrition Facts by Dr. Garcia (Podcast)

## 2024-06-24 ENCOUNTER — HOSPITAL ENCOUNTER (OUTPATIENT)
Dept: MAMMOGRAPHY | Age: 49
Discharge: HOME | End: 2024-06-24
Attending: OBSTETRICS & GYNECOLOGY

## 2024-06-24 DIAGNOSIS — Z12.31 BREAST CANCER SCREENING BY MAMMOGRAM: ICD-10-CM

## 2024-06-24 PROCEDURE — 77063 BREAST TOMOSYNTHESIS BI: CPT | Performed by: OBSTETRICS & GYNECOLOGY

## 2024-06-24 PROCEDURE — 77067 SCR MAMMO BI INCL CAD: CPT | Performed by: OBSTETRICS & GYNECOLOGY

## 2024-06-28 ENCOUNTER — LAB ENCOUNTER (OUTPATIENT)
Dept: LAB | Facility: HOSPITAL | Age: 49
End: 2024-06-28
Attending: FAMILY MEDICINE
Payer: COMMERCIAL

## 2024-06-28 ENCOUNTER — HOSPITAL ENCOUNTER (OUTPATIENT)
Dept: ULTRASOUND IMAGING | Facility: HOSPITAL | Age: 49
Discharge: HOME | End: 2024-06-28
Attending: FAMILY MEDICINE
Payer: COMMERCIAL

## 2024-06-28 DIAGNOSIS — R74.01 ELEVATED ALANINE AMINOTRANSFERASE (ALT) LEVEL: ICD-10-CM

## 2024-06-28 DIAGNOSIS — Z87.19 HISTORY OF FATTY INFILTRATION OF LIVER: ICD-10-CM

## 2024-06-28 DIAGNOSIS — R74.8 ELEVATED LIVER ENZYMES: ICD-10-CM

## 2024-06-28 LAB
ALBUMIN SERPL-MCNC: 4.4 G/DL (ref 3.2–4.8)
ALP LIVER SERPL-CCNC: 76 U/L
ALT SERPL-CCNC: 57 U/L
AST SERPL-CCNC: 24 U/L (ref ?–34)
BILIRUB DIRECT SERPL-MCNC: 0.3 MG/DL (ref ?–0.3)
BILIRUB SERPL-MCNC: 0.8 MG/DL (ref 0.3–1.2)
PROT SERPL-MCNC: 6.8 G/DL (ref 5.7–8.2)

## 2024-06-28 PROCEDURE — 80076 HEPATIC FUNCTION PANEL: CPT

## 2024-06-28 PROCEDURE — 36415 COLL VENOUS BLD VENIPUNCTURE: CPT

## 2024-06-28 PROCEDURE — 76705 ECHO EXAM OF ABDOMEN: CPT | Performed by: FAMILY MEDICINE

## 2024-06-28 PROCEDURE — 76981 USE PARENCHYMA: CPT | Performed by: FAMILY MEDICINE

## 2024-07-05 ENCOUNTER — HOSPITAL ENCOUNTER (OUTPATIENT)
Dept: MAMMOGRAPHY | Facility: HOSPITAL | Age: 49
Discharge: HOME | End: 2024-07-05
Attending: OBSTETRICS & GYNECOLOGY
Payer: COMMERCIAL

## 2024-07-05 DIAGNOSIS — R92.2 INCONCLUSIVE MAMMOGRAM: ICD-10-CM

## 2024-07-05 PROCEDURE — 77065 DX MAMMO INCL CAD UNI: CPT | Performed by: OBSTETRICS & GYNECOLOGY

## 2024-07-05 PROCEDURE — 76642 ULTRASOUND BREAST LIMITED: CPT | Performed by: OBSTETRICS & GYNECOLOGY

## 2024-07-05 PROCEDURE — 77061 BREAST TOMOSYNTHESIS UNI: CPT | Performed by: OBSTETRICS & GYNECOLOGY

## 2024-07-16 DIAGNOSIS — I10 BENIGN ESSENTIAL HYPERTENSION: ICD-10-CM

## 2024-07-16 RX ORDER — LISINOPRIL 10 MG/1
10 TABLET ORAL DAILY
Qty: 90 TABLET | Refills: 0 | Status: SHIPPED | OUTPATIENT
Start: 2024-07-16

## 2024-08-05 ENCOUNTER — TELEPHONE (OUTPATIENT)
Dept: FAMILY MEDICINE CLINIC | Facility: CLINIC | Age: 49
End: 2024-08-05

## 2024-08-05 NOTE — TELEPHONE ENCOUNTER
Called patient and she complains of jaw pain off and on x 5-6 days.  States usually after eating, the discomfort goes away.  Today though, it has not gone away.  States her left top and bottom molars don't touch.      Offered a sooner appointment and she refuse at this time.  States she is currently in [place] and will not be back until possibly tomorrow afternoon.      Per patient she will continue to monitor, if worsen, she states she will go to IC.    Patient denies any fever or neck pain or any other discomfort.

## 2024-08-05 NOTE — TELEPHONE ENCOUNTER
Appointment For: Bethany Barriga ([id number])   Visit Type: MYCHART EXAM (2964)      8/7/2024    10:00 AM  30 mins.  Sonja Lyons           [facility] [address]      Patient Comments:   Parotitis? Very uncomfortable,  can't fully close my jaw.     Ok to see Sonja?

## 2024-08-07 ENCOUNTER — LAB ENCOUNTER (OUTPATIENT)
Dept: LAB | Age: 49
End: 2024-08-07
Payer: COMMERCIAL

## 2024-08-07 ENCOUNTER — OFFICE VISIT (OUTPATIENT)
Dept: FAMILY MEDICINE CLINIC | Facility: CLINIC | Age: 49
End: 2024-08-07
Payer: COMMERCIAL

## 2024-08-07 VITALS
DIASTOLIC BLOOD PRESSURE: 62 MMHG | BODY MASS INDEX: 26.31 KG/M2 | RESPIRATION RATE: 20 BRPM | SYSTOLIC BLOOD PRESSURE: 108 MMHG | HEIGHT: 64.49 IN | TEMPERATURE: 97 F | HEART RATE: 98 BPM | WEIGHT: 156 LBS

## 2024-08-07 DIAGNOSIS — R20.2 NUMBNESS AND TINGLING OF BOTH FEET: Primary | ICD-10-CM

## 2024-08-07 DIAGNOSIS — R20.0 NUMBNESS AND TINGLING OF BOTH FEET: ICD-10-CM

## 2024-08-07 DIAGNOSIS — R20.0 NUMBNESS AND TINGLING OF BOTH FEET: Primary | ICD-10-CM

## 2024-08-07 DIAGNOSIS — K11.8 PAROTID GLAND PAIN: ICD-10-CM

## 2024-08-07 DIAGNOSIS — M25.551 RIGHT HIP PAIN: ICD-10-CM

## 2024-08-07 DIAGNOSIS — R20.2 NUMBNESS AND TINGLING OF BOTH FEET: ICD-10-CM

## 2024-08-07 LAB
ALBUMIN SERPL-MCNC: 4.8 G/DL (ref 3.2–4.8)
ALBUMIN/GLOB SERPL: 2 {RATIO} (ref 1–2)
ALP LIVER SERPL-CCNC: 75 U/L
ALT SERPL-CCNC: 31 U/L
ANION GAP SERPL CALC-SCNC: 7 MMOL/L (ref 0–18)
AST SERPL-CCNC: 19 U/L (ref ?–34)
BASOPHILS # BLD AUTO: 0.04 X10(3) UL (ref 0–0.2)
BASOPHILS NFR BLD AUTO: 0.5 %
BILIRUB SERPL-MCNC: 0.8 MG/DL (ref 0.3–1.2)
BUN BLD-MCNC: 8 MG/DL (ref 9–23)
CALCIUM BLD-MCNC: 10 MG/DL (ref 8.7–10.4)
CHLORIDE SERPL-SCNC: 107 MMOL/L (ref 98–112)
CO2 SERPL-SCNC: 25 MMOL/L (ref 21–32)
CREAT BLD-MCNC: 0.75 MG/DL
EGFRCR SERPLBLD CKD-EPI 2021: 99 ML/MIN/1.73M2 (ref 60–?)
EOSINOPHIL # BLD AUTO: 0.16 X10(3) UL (ref 0–0.7)
EOSINOPHIL NFR BLD AUTO: 2.1 %
ERYTHROCYTE [DISTWIDTH] IN BLOOD BY AUTOMATED COUNT: 12.8 %
EST. AVERAGE GLUCOSE BLD GHB EST-MCNC: 105 MG/DL (ref 68–126)
FASTING STATUS PATIENT QL REPORTED: YES
GLOBULIN PLAS-MCNC: 2.4 G/DL (ref 2–3.5)
GLUCOSE BLD-MCNC: 128 MG/DL (ref 70–99)
HBA1C MFR BLD: 5.3 % (ref ?–5.7)
HCT VFR BLD AUTO: 45.9 %
HGB BLD-MCNC: 15 G/DL
IMM GRANULOCYTES # BLD AUTO: 0.01 X10(3) UL (ref 0–1)
IMM GRANULOCYTES NFR BLD: 0.1 %
LYMPHOCYTES # BLD AUTO: 2.28 X10(3) UL (ref 1–4)
LYMPHOCYTES NFR BLD AUTO: 30 %
MAGNESIUM SERPL-MCNC: 2 MG/DL (ref 1.6–2.6)
MCH RBC QN AUTO: 29.8 PG (ref 26–34)
MCHC RBC AUTO-ENTMCNC: 32.7 G/DL (ref 31–37)
MCV RBC AUTO: 91.3 FL
MONOCYTES # BLD AUTO: 0.5 X10(3) UL (ref 0.1–1)
MONOCYTES NFR BLD AUTO: 6.6 %
NEUTROPHILS # BLD AUTO: 4.61 X10 (3) UL (ref 1.5–7.7)
NEUTROPHILS # BLD AUTO: 4.61 X10(3) UL (ref 1.5–7.7)
NEUTROPHILS NFR BLD AUTO: 60.7 %
OSMOLALITY SERPL CALC.SUM OF ELEC: 288 MOSM/KG (ref 275–295)
PLATELET # BLD AUTO: 372 10(3)UL (ref 150–450)
POTASSIUM SERPL-SCNC: 3.9 MMOL/L (ref 3.5–5.1)
PROT SERPL-MCNC: 7.2 G/DL (ref 5.7–8.2)
RBC # BLD AUTO: 5.03 X10(6)UL
SODIUM SERPL-SCNC: 139 MMOL/L (ref 136–145)
TSI SER-ACNC: 1.51 MIU/ML (ref 0.55–4.78)
VIT B12 SERPL-MCNC: 1095 PG/ML (ref 211–911)
WBC # BLD AUTO: 7.6 X10(3) UL (ref 4–11)

## 2024-08-07 PROCEDURE — 80053 COMPREHEN METABOLIC PANEL: CPT

## 2024-08-07 PROCEDURE — 85025 COMPLETE CBC W/AUTO DIFF WBC: CPT

## 2024-08-07 PROCEDURE — 83036 HEMOGLOBIN GLYCOSYLATED A1C: CPT

## 2024-08-07 PROCEDURE — 99214 OFFICE O/P EST MOD 30 MIN: CPT | Performed by: FAMILY MEDICINE

## 2024-08-07 PROCEDURE — 83735 ASSAY OF MAGNESIUM: CPT

## 2024-08-07 PROCEDURE — 84443 ASSAY THYROID STIM HORMONE: CPT

## 2024-08-07 PROCEDURE — 82607 VITAMIN B-12: CPT

## 2024-08-07 NOTE — PATIENT INSTRUCTIONS
Topical biofreeze on hip  Stretching exercises   Increase intake of water-- at least 64 oz water daily  Compression socks at work

## 2024-08-07 NOTE — PROGRESS NOTES
[facility] Family [facility] Office Note  Chief Complaint:   Chief Complaint   Patient presents with    Other     Jaw pain for 5-6 days      HPI:   This is a 48 year old female coming in for multiple concerns.    Patient developed left sided parotid gland pain about 5-6 days ago. Area was swollen and tender. She was unable to completely close her jaw. Eating and drinking increased sensitivity. Symptoms completely resolved yesterday.     Has been experiencing intermittent pain in right hip over the last few months. Pain is mild, and is aggravated with activity. Denies any injury, trauma, redness, warmth, or swelling. Patient does not like taking medication, and has not tried any over-the-counter therapies.     Has noticed occasional numbness and tingling in both feet. Episodes are brief, and occur once every few weeks. She has noticed that symptoms occur at the end of the day, and after work. She admits that she does not drink enough water during the day.     Patient Active Problem List   Diagnosis    Nephrocalcinosis    Unspecified acute reaction to stress    Attention deficit disorder with hyperactivity(314.01)    Psoriasis    Benign essential hypertension    Environmental allergies    Primary osteoarthritis of first carpometacarpal joint of right hand    Right inguinal hernia    Hyperglycemia    Encounter for therapeutic drug monitoring    Benign neoplasm of transverse colon    Class 1 obesity with serious comorbidity and body mass index (BMI) of 32.0 to 32.9 in adult    Class 1 obesity with serious comorbidity and body mass index (BMI) of 31.0 to 31.9 in adult    Primary hypertension    Overweight (BMI 25.0-29.9)    Insulin resistance     Past Medical History:    Abdominal hernia    Left inguinal hernia repaired in 2003 and i currently have a right inguinal hernia diagnosed in 2018 but not repaired yet    Blood in urine    Microscopic amounts noted in routine urine samples over the last few years     Calculus of kidney    2 small stones noted    Change in hair    Hair loss    Decorative tattoo    Diarrhea, unspecified    During heavy antibiotic use and now very often after removal of gallbladder 6/21    Esophageal reflux    Frequent bowel movements    Heartburn    Hemorrhoid    Hemorrhoids    High blood pressure    Human papilloma virus infection    20 year ago per pt    Pain in joints    Knees    Pap smear for cervical cancer screening    negative hpv negative    PONV (postoperative nausea and vomiting)    Psoriasis    Stool incontinence    During heavy antibiotic use and now occasionally since gallbladder removal 6/21    Stress    I have a young adult son with severe mental health problems    Vertigo    Vertigo    Visual impairment    glasses    Wears glasses     Past Surgical History:   Procedure Laterality Date    Cholecystectomy  6/21    Colposcopy,bx cervix/endocerv curr  08/30/2021    Hernia surgery  10/01/2003    Left    Laparoscopic cholecystectomy  06/18/2021    Nasal scopy,remv part ethmoid  05/14/2021    Nasal scopy,rmv tiss maxill sinus Left 05/14/2021    Oral surgery      Other surgical history      Removal of ovarian cyst(s)  2007     Social History:  Social History     Socioeconomic History    Marital status:    Tobacco Use    Smoking status: Never     Passive exposure: Never    Smokeless tobacco: Never   Vaping Use    Vaping status: Never Used   Substance and Sexual Activity    Alcohol use: No    Drug use: No    Sexual activity: Not Currently     Birth control/protection: OCP   Other Topics Concern    Caffeine Concern No    Stress Concern No    Weight Concern No    Exercise Yes     Comment: Usually only once or twice per week    Seat Belt Yes     Family History:  Family History   Problem Relation Age of Onset    Heart Disorder Father     Other (HTN) Father     Diabetes Father     Hypertension Father         Both parents    Other (HTN) Mother     Diabetes Maternal Grandfather     Breast  Cancer Paternal Grandmother 40        In her 40's    Cancer Maternal Uncle         esophageal dx age 50s    Cancer Maternal Uncle         bile ducts possble liver, dx age 60s    Prostate Cancer Maternal Uncle         dx age 50s    Ovarian Cancer Neg     Uterine Cancer Neg     Pancreatic Cancer Neg     Colon Cancer Neg     Infertility Neg     Endometriosis Neg      Allergies:  Allergies   Allergen Reactions    Omnicef [Cefdinir] FACE FLUSHING    Penicillins FACE FLUSHING     Redness all over body, digestive issues and incontinence      Current Meds:  Current Outpatient Medications   Medication Sig Dispense Refill    lisinopril 10 MG Oral Tab TAKE 1 TABLET BY MOUTH EVERY DAY 90 tablet 0    Phentermine HCl 30 MG Oral Cap Take 1 capsule (30 mg total) by mouth every morning. 30 capsule 3    buPROPion  MG Oral Tablet 24 Hr Take 1 tablet (150 mg total) by mouth every morning. 90 tablet 1    Levonorgest-Eth Estrad 91-Day 0.15-0.03 &0.01 MG Oral Tab Take 1 tablet by mouth daily. 91 tablet 3    ketoconazole 2 % External Shampoo       mometasone 0.1 % External Ointment       Ferrous Sulfate ER (SLOW FE) 142 (45 Fe) MG Oral Tab CR Take by mouth every other day.  0    Cholecalciferol (VITAMIN D) 25 MCG (1000 UT) Oral Tab Take 1,000 Units by mouth daily.        Fluocinonide 0.05 % External Solution Apply once daily as needed.  Max 2 weeks. 60 mL 2      Counseling given: Not Answered       REVIEW OF SYSTEMS:   Review of Systems   Constitutional: Negative.  Negative for chills and fever.   HENT: Negative.     Eyes: Negative.    Respiratory: Negative.     Cardiovascular: Negative.    Gastrointestinal: Negative.    Endocrine: Negative.    Genitourinary: Negative.    Musculoskeletal:  Positive for joint pain. Negative for joint swelling.   Skin: Negative.    Neurological:  Positive for numbness.   Hematological: Negative.    Psychiatric/Behavioral: Negative.           EXAM:   /62   Pulse 98   Temp 97.3 °F (36.3 °C)    Resp 20   Ht 5' 4.49\" (1.638 m)   Wt 156 lb (70.8 kg)   BMI 26.37 kg/m²  Estimated body mass index is 26.37 kg/m² as calculated from the following:    Height as of this encounter: 5' 4.49\" (1.638 m).    Weight as of this encounter: 156 lb (70.8 kg).   Vital signs reviewed.Appears stated age, well groomed.  Physical Exam  Constitutional:       Appearance: Normal appearance.   HENT:      Head: Normocephalic and atraumatic.      Nose: Nose normal.      Mouth/Throat:      Mouth: Mucous membranes are moist.   Eyes:      Conjunctiva/sclera: Conjunctivae normal.      Pupils: Pupils are equal, round, and reactive to light.   Cardiovascular:      Rate and Rhythm: Normal rate and regular rhythm.      Pulses: Normal pulses.           Dorsalis pedis pulses are 2+ on the right side and 2+ on the left side.      Heart sounds: Normal heart sounds. No murmur heard.  Pulmonary:      Effort: Pulmonary effort is normal.      Breath sounds: Normal breath sounds. No wheezing, rhonchi or rales.   Musculoskeletal:      Right hip: No deformity, tenderness or bony tenderness. Decreased range of motion. Normal strength.      Left hip: Normal.      Right foot: Normal.      Left foot: Normal.      Comments: GERARDO test of right hip is positive    Feet:      Right foot:      Skin integrity: Skin integrity normal.      Left foot:      Skin integrity: Skin integrity normal.   Skin:     General: Skin is warm and dry.      Capillary Refill: Capillary refill takes less than 2 seconds.   Neurological:      General: No focal deficit present.      Mental Status: She is alert and oriented to person, place, and time. Mental status is at baseline.      Cranial Nerves: Cranial nerves 2-12 are intact.      Sensory: Sensation is intact.      Motor: Motor function is intact.   Psychiatric:         Mood and Affect: Mood normal.         Behavior: Behavior normal.         ASSESSMENT AND PLAN:     1. Numbness and tingling of both feet  - Vitamin B12 [E];  Future  - CBC W Differential W Platelet [E]; Future  - Comp Metabolic Panel (14) [E]; Future  - TSH W Reflex To Free T4 [E]; Future  - Hemoglobin A1C [E]; Future  - Magnesium [E]; Future    2. Right hip pain  - Pain is mild and tolerable per patient. She is not interested in physical therapy at this time.   - Recommend conservative management; provided handouts on hip exercises and stretches  - If no improvement, will consider imaging, physical therapy   - Recommend topical biofreeze  - Warm/ cool compresses     3. Parotid gland pain  - Resolved per patient  - Notify me with any new or worsening symptoms         Return if symptoms worsen or fail to improve.    Meds & Refills for this Visit:  Requested Prescriptions      No prescriptions requested or ordered in this encounter       The patient indicates understanding of these issues and agrees to the plan.     Health Maintenance:  Health Maintenance Due   Topic Date Due    DTaP,Tdap,and Td Vaccines (1 - Tdap) Never done    COVID-19 Vaccine (3 - 2023-24 season) 09/01/2023         Sonja Lyons, APRN    Please note that portions of this note may have been completed with a voice recognition program. Efforts were made to edit the dictations but occasionally words are mis-transcribed.    The 21st Century Cures Act makes medical notes like these available to patients in the interest of transparency. Please be advised this is a medical document. Medical documents are intended to carry relevant information, facts as evident, and the clinical opinion of the practitioner. The medical note is intended as peer to peer communication and may appear blunt or direct. It is written in medical language and may contain abbreviations or verbiage that are unfamiliar.

## 2024-08-15 ENCOUNTER — OFFICE VISIT (OUTPATIENT)
Dept: DERMATOLOGY CLINIC | Facility: CLINIC | Age: 49
End: 2024-08-15
Payer: COMMERCIAL

## 2024-08-15 DIAGNOSIS — Z86.39 HISTORY OF VITAMIN D DEFICIENCY: Primary | ICD-10-CM

## 2024-08-15 DIAGNOSIS — L65.9 HAIR LOSS: ICD-10-CM

## 2024-08-15 DIAGNOSIS — D23.9 BENIGN NEOPLASM OF SKIN, UNSPECIFIED LOCATION: ICD-10-CM

## 2024-08-15 DIAGNOSIS — D22.9 MULTIPLE NEVI: ICD-10-CM

## 2024-08-15 PROCEDURE — 99204 OFFICE O/P NEW MOD 45 MIN: CPT | Performed by: DERMATOLOGY

## 2024-08-15 RX ORDER — MINOXIDIL 2.5 MG/1
1.25 TABLET ORAL DAILY
Qty: 15 TABLET | Refills: 2 | Status: SHIPPED | OUTPATIENT
Start: 2024-08-15

## 2024-08-19 ENCOUNTER — LAB ENCOUNTER (OUTPATIENT)
Dept: LAB | Age: 49
End: 2024-08-19
Attending: DERMATOLOGY
Payer: COMMERCIAL

## 2024-08-19 ENCOUNTER — OFFICE VISIT (OUTPATIENT)
Dept: INTERNAL MEDICINE CLINIC | Facility: CLINIC | Age: 49
End: 2024-08-19
Payer: COMMERCIAL

## 2024-08-19 VITALS
HEART RATE: 90 BPM | HEIGHT: 64.5 IN | WEIGHT: 155 LBS | DIASTOLIC BLOOD PRESSURE: 86 MMHG | SYSTOLIC BLOOD PRESSURE: 128 MMHG | BODY MASS INDEX: 26.14 KG/M2 | RESPIRATION RATE: 16 BRPM

## 2024-08-19 DIAGNOSIS — E66.3 OVERWEIGHT (BMI 25.0-29.9): ICD-10-CM

## 2024-08-19 DIAGNOSIS — Z86.39 HISTORY OF OBESITY: ICD-10-CM

## 2024-08-19 DIAGNOSIS — I10 PRIMARY HYPERTENSION: ICD-10-CM

## 2024-08-19 DIAGNOSIS — Z51.81 ENCOUNTER FOR THERAPEUTIC DRUG MONITORING: Primary | ICD-10-CM

## 2024-08-19 DIAGNOSIS — L65.9 HAIR LOSS: ICD-10-CM

## 2024-08-19 DIAGNOSIS — E88.819 INSULIN RESISTANCE: ICD-10-CM

## 2024-08-19 DIAGNOSIS — Z86.39 HISTORY OF VITAMIN D DEFICIENCY: ICD-10-CM

## 2024-08-19 LAB
DEPRECATED HBV CORE AB SER IA-ACNC: 47.7 NG/ML
DHEA-S SERPL-MCNC: 208.3 UG/DL
VIT D+METAB SERPL-MCNC: 34.4 NG/ML (ref 30–100)

## 2024-08-19 PROCEDURE — 82627 DEHYDROEPIANDROSTERONE: CPT

## 2024-08-19 PROCEDURE — 82306 VITAMIN D 25 HYDROXY: CPT

## 2024-08-19 PROCEDURE — 84410 TESTOSTERONE BIOAVAILABLE: CPT

## 2024-08-19 PROCEDURE — 82728 ASSAY OF FERRITIN: CPT

## 2024-08-19 PROCEDURE — 99213 OFFICE O/P EST LOW 20 MIN: CPT | Performed by: NURSE PRACTITIONER

## 2024-08-19 RX ORDER — BUPROPION HYDROCHLORIDE 300 MG/1
300 TABLET ORAL EVERY MORNING
Qty: 90 TABLET | Refills: 1 | Status: SHIPPED | OUTPATIENT
Start: 2024-08-19

## 2024-08-19 RX ORDER — PHENTERMINE HYDROCHLORIDE 30 MG/1
30 CAPSULE ORAL EVERY MORNING
Qty: 30 CAPSULE | Refills: 3 | Status: CANCELLED | OUTPATIENT
Start: 2024-08-19

## 2024-08-19 RX ORDER — PHENTERMINE HYDROCHLORIDE 37.5 MG/1
37.5 TABLET ORAL EVERY MORNING
Qty: 30 TABLET | Refills: 4 | Status: SHIPPED | OUTPATIENT
Start: 2024-08-19

## 2024-08-19 NOTE — PATIENT INSTRUCTIONS
Continue making lifestyle changes that focus on good nutrition, regular exercise and stress management.    Medication Plan: Continue current medication regimen.    Next steps to work on before next office visit include: Continue to focus on balanced nutrition with protein and produce at the forefront. Recommend tracking protein to determine sufficient intake.    I also recommend modifying nutrition with a focus on Food 4 Fuel and eating clean, lean and green!     Eat whole foods (think farm to table sources) and minimize/eliminate processed and ultra processed foods.  Eat lean sources of protein, recommending incorporating plant based options (no fat/cholesterol in plants) and focus on lean animal protein sources such as eggs, chicken and fish. Minimize beef such as pork and red meat to 1 serving/week.  Increase the consumption of plant based foods with a goal of making 85% of your daily nutrition from plants. Plant based foods are less calorically dense and more nutritionally dense. They do not have fat, which can contribute to insulin resistance and elevated cholesterol. Fruits and vegetables provide an array of vitamins, minerals, phytonutrients (plant protectors) and antiinflammatory properties. All these components help to prevent disease and help your body to work properly!      Re-thinking Nutrition: Learning to See Food as Fuel  October 8, 2019  Posted in Blog, Nutrition  [employer] [facility]    “Dieting” can start to feel challenging when we begin to associate healthy behaviors with “punishment.” Too often, we overlook the real reason we eat food. It's not only meant to be enjoyed, but also to provide basic fuel for our bodies.  Learning to see food as fuel can help you find balance in your journey with weight. It will teach you about the primal purpose of food, the effect certain foods have on health, and how to listen carefully to what your body is trying to tell you.  It Starts with Cells  Did  you know your body has more than 35 trillion cells? Each one serves a purpose.  Once a cell has completed its purpose, it dies. Your body replaces dead and worn-out cells with new and energetic ones. It also depends on this ongoing cell cycle to keep you healthy. And guess what? The foods you eat help shape this process.  Seeing Food as Fuel  Eating the right foods helps build and repair cells to be stronger than before. It does this by getting nutrition from whole grains, vitamins, minerals, fats, protein and other nutrients.  These essential nutrients matter greatly to every single cell in your body. They make up your:  Cell membrane  Nucleus  Mitochondria  When cells join together, they make tissue that brings life to your bones, brain, skin, nerves, muscles, etc. The health and lifespan of your cells depend on the nutrients you get from food. That is why healthy food choices are so important.   Once you can start to see food as being fuel for your body, you will get better at making the healthy choice the easy choice. Food will be less about rewards or punishments and more about supporting your overall health and happiness.  Building Blocks of Good Nutrition  A diet without enough fiber, protein and healthy fats can cause your cells to become brittle, leaky and tired. When cells can't do what they are designed to do, problems like inflammation, cancer and other difficult health conditions start to arise.  Foods that Support Healthy Cells:  Unsaturated Fats - Fish, nuts avocados, olive oil, flax seed, etc.  Protein - Poultry, lean beef, yogurt, eggs, seeds, beans, etc.  Antioxidants - Fruits, dark green veggies, sweet potatoes, tea, etc.  So, the next time you grab something to eat, ask yourself how that food helps or hurts your body. This is a part of living mindful and being knowledgeable about what you consume regularly.  When you start to see food as fuel, not just a tasty treat or the solution to a bad  temper, you will start to make healthier choices more often. Making new habits is one of the building blocks to successful long-term weight management!  Clean Eating: What is it Really About?    March 18, 2022  Posted in Blog, Nutrition  [employer] [facility]    Many people focus on “clean eating.” In a world filled with potato chips, fast food burgers and cake, switching to clean eating can be a big change. Is it time for you to make it?    What is Clean Eating?  Eating clean has many variations and has gained popularity over the last several years. It focuses on choosing whole foods and minimally-processed foods and is more of a philosophy than a diet. The goal is to choose foods as close to their natural state as possible. Adding fruits, vegetables, meats, and whole grains is a great start. Processed foods such as chips, cookies and fats are eliminated.    Unlike other plans, clean eating isn’t specifically about portion sizes or numbers. Some find this way of eating to be easier. There is limited research on eating clean; however, a clean diet is plant-based and low in saturated fat.    Tips for Eating Clean    Limit packaged processed foods. Some of this is obvious. It’s time to trade in the chips, cookies and snack cakes for other foods such as fruits, vegetables and nuts. Additionally, you might need to change how you prepare food. Swap boxed mashed potatoes for whole baked potatoes. Instead of bagged salad, chop your own salad from fresh vegetables.    Read the labels. With the focus on minimally-processed foods, the fewer the ingredients the better. Avoid added sugar, sweeteners and preservatives.  Find other ways to spice up your food. Fresh herbs can go further than any added preservative. Basil, cilantro or chives can spice up any meal.  Choose whole grains. White bread and refined grains should be limited. Instead, choose whole-grain bread, quinoa, brown rice and oats.    Hydrate with water.  Water is your main source of fluid. For some variety and extra flavor, add a slice of lime or cucumber into your water. Sodas and sugary drinks should be eliminated. Herbal teas are a great option.  Dive into dairy. Milk, unsweetened yogurt and cheese can be great choices. Try to avoid sweetened milks or yogurts.  Pack the protein. Protein from beans, nuts and legumes are great. So are lean meats without fatty flavorings. Some clean eaters avoid meat from certain grocery stores or brands due to growth hormones and antibiotics. For those, choosing organic may be an option.    Take Things Slow  It can be a little overwhelming to begin a clean eating plan. Throwing away the contents of your cabinet may not be an option. Instead of taking away, focus on adding. Add more fruit and more vegetables to your day. You will find that by doing this, there is less room for processed foods. Small changes over time can add up. Get started today!      What are proteins?  Proteins are one of three primary macronutrients that provide energy to the human body, along with fats and carbohydrates. Proteins are also responsible for a large portion of the work that is done in cells; they are necessary for proper structure and function of tissues and organs, and also act to regulate them. They are comprised of a number of amino acids that are essential to proper body function, and serve as the building blocks of body tissue.  There are 20 different amino acids in total, and the sequence of amino acids determines a protein's structure and function. While some amino acids can be synthesized in the body, there are 9 amino acids that humans can only obtain from dietary sources (insufficient amounts of which may sometimes result in death), termed essential amino acids. Foods that provide all of the essential amino acids are called complete protein sources, and include both animal (meat, dairy, eggs, fish) as well as plant-based sources (soy,  quinoa, buckwheat).  Proteins can be categorized based on the function they provide to the body. Below is a list of some types of proteins:  Antibody--proteins that protect the body from foreign particles, such as viruses and bacteria, by binding to them  Enzyme--proteins that help form new molecules as well as perform the many chemical reactions that occur throughout the body  Messenger--proteins that transmit signals throughout the body to maintain body processes  Structural component--proteins that act as building blocks for cells that ultimately allow the body to move  Transport/storage--proteins that move molecules throughout the body  As can be seen, proteins have many important roles throughout the body, and as such, it is important to provide sufficient nutrition to the body to maintain healthy protein levels.    How much protein do I need?  The amount of protein that the human body requires daily is dependent on many conditions, including overall energy intake, growth of the individual, and physical activity level. It is often estimated based on body weight, as a percentage of total caloric intake (10-35%), or based on age alone. 0.8g/kg of body weight is a commonly cited recommended dietary allowance (RDA). This value is the minimum recommended value to maintain basic nutritional requirements, but consuming more protein, up to a certain point, maybe beneficial, depending on the sources of the protein.  The recommended range of protein intake is between 0.8 g/kg and 1.8 g/kg of body weight, dependent on the many factors listed above. People who are highly active, or who wish to build more muscle should generally consume more protein. Some sources suggest consuming between 1.8 to 2 g/kg for those who are highly active. The amount of protein a person should consume, to date, is not an exact science, and each individual should consult a specialist, be it a dietitian, doctor, or , to help  determine their individual needs. . I recommend consuming a minimum of 70 grams of protein daily and optimally 85 grams/day.    Foods high in protein  There are many different combinations of food that a person can eat to meet their protein intake requirements. For many people, a large portion of protein intake comes from meat and dairy, though it is possible to get enough protein while meeting certain dietary restrictions you might have. Generally, it is easier to meet your RDA of protein by consuming meat and dairy, but an excess of either can have a negative health impact. There are plenty of plant-based protein options, but they generally contain less protein in a given serving. Ideally, a person should consume a mixture of meat, dairy, and plant-based foods in order to meet their RDA and have a balanced diet replete with nutrients.  If possible, consuming a variety of complete proteins is recommended. A complete protein is a protein that contains a good amount of each of the nine essential amino acids required in the human diet. Examples of complete protein foods or meals include:    Meat/Dairy examples  Eggs  Chicken breast  Cottage cheese  Greek yogurt  Milk  Lean beef  Tuna  Turkey breast  Fish  Shrimp    Vegan/plant-based examples  Buckwheat  Hummus and malu  Soy products (tofu, tempeh, edamame beans)  Peanut butter on toast or some other bread  Beans and rice  Quinoa  Hemp and ethan seeds  Spirulina  Generally, meat, poultry, fish, eggs, and dairy products are complete protein sources. Nuts and seeds, legumes, grains, and vegetables, among other things, are usually incomplete proteins. There is nothing wrong with incomplete proteins however, and there are many healthy, high protein foods that are incomplete proteins. As long as you consume a sufficient variety of incomplete proteins to get all the required amino acids, it is not necessary to specifically eat complete protein foods. In fact, certain high fat  red meats for example, a common source of complete proteins, can be unhealthy.   Below are some examples of high protein foods that are not complete proteins:  Almonds  Oats  Broccoli  Lentils  Isac bread  Steven seeds  Pumpkin seeds  Peanuts  Shelby sprouts  Grapefruit  Green peas  Avocados  Mushrooms  As can be seen, there are many different foods a person can consume to meet their RDA of protein. The examples provided above do not constitute an exhaustive list of high protein or complete protein foods. As with everything else, balance is important, and the examples provided above are an attempt at providing a list of healthier protein options (when consumed in moderation).    Amount of protein in common food      Protein Amount  Milk (1 cup/8 oz)  8 g  Egg (1 large/50 g)  6 g  Meat (1 slice / 2 oz)  14 g  Seafood (2 oz)  16 g  Bread (1 slice/64 g)   8 g  Corn (1 cup/166 g)  16 g  Rice (1 cup/195 g)  5 g  Dry Bean (1 cup/92 g)   16 g  Nuts (1 cup/92 g)    20 g  Fruits and Veggie (1 cup)  1 g    Data above taken from www.calculator.net    The Power of Protein:    High Protein Foods:  FISH  (3-6 ounces/meal)  All types of fish  Seafood (shrimp, scallops, clams, mussels, lobster)  EGGS     2-3 eggs/meal  DAIRY (2/3 to 1 ½ cup)  Cottage cheese   Greek yogurt  PLANTS (½-3/4 cup/meal)  Legumes: Dried beans and peas (black beans, henderson beans, garbanzo beans, kidney, cannellini, navy, split peas, black eyed peas)  Lentils  Quinoa  Soy (edamame, tofu)  PORK   (3-6 oz/meal)  Tenderloin  Pork chop  Top loin roast, boneless  Sirloin roast, boneless  Qatari bowen (nitrate free)  Boiled deli ham (nitrate free)    Additional Protein Sources:    BEEF    (3-6 oz/meal)  Flank steak       Skirt steak  Bottom round(rump roast), select   Ground beef, 90% lean (ground sirloin)  Sven eye steak, choice  Eye of round roast, choice   POULTRY  (3-6 oz/meal)  Ground chicken or turkey  Chicken, no skin  Turkey, no skin  PROTEIN SHAKES:  Robby (plant based and dairy free), [employer] [facility] (plant based option available),  Protein, JuicePlus Complete (www.juiceplus.com)  PROTEIN BARS: RXBAR, Power[employer]unch, Quest, Barebells, Mosh      Nutrition and MyPlate: Vegetables  Vegetables are a major source of fiber. They’re also packed with vitamins needed for health and growth. At mealtimes, make half your plate fruits and vegetables.  Nutrient-rich choices  Fresh, frozen, or canned--all vegetables are high in nutrients. The color of the skin tells you what’s inside. So if you eat plenty of colors, you get a variety of nutrients. Some good choices include:  Dark green vegetables, such as spinach, saran greens, kale, and broccoli.  Bright red and orange vegetables, such as carrots, sweet potatoes, red bell peppers, and tomatoes.  Starchy vegetables, such as potatoes and squash.  What makes vegetables less healthy?  Boiling vegetables causes some vitamins to escape into the water. To hold on to vitamins, briefly steam, sauté, stir-doan, or microwave instead. Overcooking destroys vitamins, so try to keep vegetables a little crispy.  Using a lot of margarine, butter, or salad dressing adds fat and calories, but not many nutrients. A small amount of these toppings is OK. But the more you add, the more fat you add, too.  Frozen vegetables that come with cheese sauce or other processed flavoring are high in fat and salt. It's healthier to season plain frozen vegetables yourself. Try fresh herbs, garlic, toasted almonds, or sesame seeds.  Canned vegetables often have lots of salt. Shop for low-sodium varieties.  One small change  Sneak vegetables into every meal. Shred carrots into hamburger, or add zucchini to spaghetti and meatballs. You won't even notice! Have a better idea? Write it here:  ________________________________________________________  Date Last Reviewed: 10/1/2017  © 3918-9516 [employer]. [address] [place] [address]  Road, [place] [address]. All rights reserved. This information is not intended as a substitute for professional medical care. Always follow your healthcare professional's instructions.

## 2024-08-19 NOTE — PROGRESS NOTES
Bethany Barriga is a 48 year old female presents today for follow-up on medical weight loss program for the treatment of overweight, obesity, or morbid obesity with associated HTN, IR.    S:  Current weight   Wt Readings from Last 6 Encounters:   08/19/24 155 lb (70.3 kg)   08/07/24 156 lb (70.8 kg)   04/17/24 164 lb (74.4 kg)   04/05/24 167 lb (75.8 kg)   01/20/24 180 lb (81.6 kg)   01/11/24 179 lb (81.2 kg)    AND BMI Body mass index is 26.19 kg/m²..    Patient has lost 9# since LOV 4 months ago.  She has been compliant with therapy, but inconsistent with phentermine. Would like to get down to 150# or a little less to provider a buffer. No lifestyle log completed. No longer tracking nutrition. Continues with regular exercise and focus on balanced nutrition. Repeat labs reviewed in EMR - sufficient Vitamin B12. Pt no longer having GERD.    Testing/consult completed since LOV: Dietician: Ranjan 7/12/23: Estimated caloric needs for weight loss: 1600 cals/d for 1 pounds/week weight loss.    Social hx and PMH reviewed. 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    REVIEW OF SYSTEMS:  GENERAL: feels well otherwise  LUNGS: denies shortness of breath with exertion  CARDIOVASCULAR: denies chest pain on exertion, denies palpitations or pedal edema  GI: denies abdominal pain.  No N/V/D/C  MUSCULOSKELETAL: no acute joint or muscle pain  NEURO: denies headaches  PSYCH: denies change in behavior or mood, denies feeling sad or depressed    EXAM:  /86   Pulse 90   Resp 16   Ht 5' 4.5\" (1.638 m)   Wt 155 lb (70.3 kg)   BMI 26.19 kg/m²   GENERAL: well developed, well nourished, in no apparent distress, overweight  HEENT: no patchy hair loss noted  EYES: conjunctiva pink, sclera non icteric, PERRLA  LUNGS: CTA in all fields, breathing non labored  CARDIO: RRR without murmur, normal S1 and S2 without clicks or gallops, no pedal edema.  GI: +BS  NEURO/MS: motor and sensory grossly intact  PSYCH:  pleasant, cooperative, normal mood and affect    ASSESSMENT AND PLAN:  Reviewed Initial Weight Data and Goal Weight Loss:       Encounter Diagnoses   Name Primary?    Encounter for therapeutic drug monitoring Yes    Overweight (BMI 25.0-29.9)     History of obesity     Primary hypertension     Insulin resistance          No orders of the defined types were placed in this encounter.      Meds & Refills for this Visit:  Requested Prescriptions     Signed Prescriptions Disp Refills    buPROPion  MG Oral Tablet 24 Hr 90 tablet 1     Sig: Take 1 tablet (300 mg total) by mouth every morning.    Phentermine HCl 37.5 MG Oral Tab 30 tablet 4     Sig: Take 1 tablet (37.5 mg total) by mouth every morning.       Imaging & Consults:  None      Plan:  Patient has lost 9# since LOV 4 months ago on phentermine 30 mg daily, Wellbutrin  mg daily with a total weight loss of 38# since initial consult on 6/22/23 with initial weight of 193#.  Weight loss goal: To move more and eat less in 6 months and To weigh 25 lbs less- goal met and feel better in 1 year. Next goal 150#. Increase Wellbutrin XL and phentermine. Hx of naltrexone with SEs of visual changes. Topamax with SEs of menstrual irregularities.  on nutrition/protein. See patient instructions below for additional plans and patient counseling.      Patient Instructions   Continue making lifestyle changes that focus on good nutrition, regular exercise and stress management.    Medication Plan: Continue current medication regimen.    Next steps to work on before next office visit include: Continue to focus on balanced nutrition with protein and produce at the forefront. Recommend tracking protein to determine sufficient intake.    I also recommend modifying nutrition with a focus on Food 4 Fuel and eating clean, lean and green!     Eat whole foods (think farm to table sources) and minimize/eliminate processed and ultra processed foods.  Eat lean sources of protein,  recommending incorporating plant based options (no fat/cholesterol in plants) and focus on lean animal protein sources such as eggs, chicken and fish. Minimize beef such as pork and red meat to 1 serving/week.  Increase the consumption of plant based foods with a goal of making 85% of your daily nutrition from plants. Plant based foods are less calorically dense and more nutritionally dense. They do not have fat, which can contribute to insulin resistance and elevated cholesterol. Fruits and vegetables provide an array of vitamins, minerals, phytonutrients (plant protectors) and antiinflammatory properties. All these components help to prevent disease and help your body to work properly!      Re-thinking Nutrition: Learning to See Food as Fuel  October 8, 2019  Posted in Blog, Nutrition  By [employer] [facility]    “Dieting” can start to feel challenging when we begin to associate healthy behaviors with “punishment.” Too often, we overlook the real reason we eat food. It's not only meant to be enjoyed, but also to provide basic fuel for our bodies.  Learning to see food as fuel can help you find balance in your journey with weight. It will teach you about the primal purpose of food, the effect certain foods have on health, and how to listen carefully to what your body is trying to tell you.  It Starts with Cells  Did you know your body has more than 35 trillion cells? Each one serves a purpose.  Once a cell has completed its purpose, it dies. Your body replaces dead and worn-out cells with new and energetic ones. It also depends on this ongoing cell cycle to keep you healthy. And guess what? The foods you eat help shape this process.  Seeing Food as Fuel  Eating the right foods helps build and repair cells to be stronger than before. It does this by getting nutrition from whole grains, vitamins, minerals, fats, protein and other nutrients.  These essential nutrients matter greatly to every single cell in your  body. They make up your:  Cell membrane  Nucleus  Mitochondria  When cells join together, they make tissue that brings life to your bones, brain, skin, nerves, muscles, etc. The health and lifespan of your cells depend on the nutrients you get from food. That is why healthy food choices are so important.   Once you can start to see food as being fuel for your body, you will get better at making the healthy choice the easy choice. Food will be less about rewards or punishments and more about supporting your overall health and happiness.  Building Blocks of Good Nutrition  A diet without enough fiber, protein and healthy fats can cause your cells to become brittle, leaky and tired. When cells can't do what they are designed to do, problems like inflammation, cancer and other difficult health conditions start to arise.  Foods that Support Healthy Cells:  Unsaturated Fats - Fish, nuts avocados, olive oil, flax seed, etc.  Protein - Poultry, lean beef, yogurt, eggs, seeds, beans, etc.  Antioxidants - Fruits, dark green veggies, sweet potatoes, tea, etc.  So, the next time you grab something to eat, ask yourself how that food helps or hurts your body. This is a part of living mindful and being knowledgeable about what you consume regularly.  When you start to see food as fuel, not just a tasty treat or the solution to a bad temper, you will start to make healthier choices more often. Making new habits is one of the building blocks to successful long-term weight management!  Clean Eating: What is it Really About?    March 18, 2022  Posted in Blog, Nutrition  [employer] [facility]    Many people focus on “clean eating.” In a world filled with potato chips, fast food burgers and cake, switching to clean eating can be a big change. Is it time for you to make it?    What is Clean Eating?  Eating clean has many variations and has gained popularity over the last several years. It focuses on choosing whole foods and  minimally-processed foods and is more of a philosophy than a diet. The goal is to choose foods as close to their natural state as possible. Adding fruits, vegetables, meats, and whole grains is a great start. Processed foods such as chips, cookies and fats are eliminated.    Unlike other plans, clean eating isn’t specifically about portion sizes or numbers. Some find this way of eating to be easier. There is limited research on eating clean; however, a clean diet is plant-based and low in saturated fat.    Tips for Eating Clean    Limit packaged processed foods. Some of this is obvious. It’s time to trade in the chips, cookies and snack cakes for other foods such as fruits, vegetables and nuts. Additionally, you might need to change how you prepare food. Swap boxed mashed potatoes for whole baked potatoes. Instead of bagged salad, chop your own salad from fresh vegetables.    Read the labels. With the focus on minimally-processed foods, the fewer the ingredients the better. Avoid added sugar, sweeteners and preservatives.  Find other ways to spice up your food. Fresh herbs can go further than any added preservative. Basil, cilantro or chives can spice up any meal.  Choose whole grains. White bread and refined grains should be limited. Instead, choose whole-grain bread, quinoa, brown rice and oats.    Hydrate with water. Water is your main source of fluid. For some variety and extra flavor, add a slice of lime or cucumber into your water. Sodas and sugary drinks should be eliminated. Herbal teas are a great option.  Dive into dairy. Milk, unsweetened yogurt and cheese can be great choices. Try to avoid sweetened milks or yogurts.  Pack the protein. Protein from beans, nuts and legumes are great. So are lean meats without fatty flavorings. Some clean eaters avoid meat from certain grocery stores or brands due to growth hormones and antibiotics. For those, choosing organic may be an option.    Take Things Slow  It can  be a little overwhelming to begin a clean eating plan. Throwing away the contents of your cabinet may not be an option. Instead of taking away, focus on adding. Add more fruit and more vegetables to your day. You will find that by doing this, there is less room for processed foods. Small changes over time can add up. Get started today!      What are proteins?  Proteins are one of three primary macronutrients that provide energy to the human body, along with fats and carbohydrates. Proteins are also responsible for a large portion of the work that is done in cells; they are necessary for proper structure and function of tissues and organs, and also act to regulate them. They are comprised of a number of amino acids that are essential to proper body function, and serve as the building blocks of body tissue.  There are 20 different amino acids in total, and the sequence of amino acids determines a protein's structure and function. While some amino acids can be synthesized in the body, there are 9 amino acids that humans can only obtain from dietary sources (insufficient amounts of which may sometimes result in death), termed essential amino acids. Foods that provide all of the essential amino acids are called complete protein sources, and include both animal (meat, dairy, eggs, fish) as well as plant-based sources (soy, quinoa, buckwheat).  Proteins can be categorized based on the function they provide to the body. Below is a list of some types of proteins:  Antibody--proteins that protect the body from foreign particles, such as viruses and bacteria, by binding to them  Enzyme--proteins that help form new molecules as well as perform the many chemical reactions that occur throughout the body  Messenger--proteins that transmit signals throughout the body to maintain body processes  Structural component--proteins that act as building blocks for cells that ultimately allow the body to move  Transport/storage--proteins  that move molecules throughout the body  As can be seen, proteins have many important roles throughout the body, and as such, it is important to provide sufficient nutrition to the body to maintain healthy protein levels.    How much protein do I need?  The amount of protein that the human body requires daily is dependent on many conditions, including overall energy intake, growth of the individual, and physical activity level. It is often estimated based on body weight, as a percentage of total caloric intake (10-35%), or based on age alone. 0.8g/kg of body weight is a commonly cited recommended dietary allowance (RDA). This value is the minimum recommended value to maintain basic nutritional requirements, but consuming more protein, up to a certain point, maybe beneficial, depending on the sources of the protein.  The recommended range of protein intake is between 0.8 g/kg and 1.8 g/kg of body weight, dependent on the many factors listed above. People who are highly active, or who wish to build more muscle should generally consume more protein. Some sources suggest consuming between 1.8 to 2 g/kg for those who are highly active. The amount of protein a person should consume, to date, is not an exact science, and each individual should consult a specialist, be it a dietitian, doctor, or , to help determine their individual needs. . I recommend consuming a minimum of 70 grams of protein daily and optimally 85 grams/day.    Foods high in protein  There are many different combinations of food that a person can eat to meet their protein intake requirements. For many people, a large portion of protein intake comes from meat and dairy, though it is possible to get enough protein while meeting certain dietary restrictions you might have. Generally, it is easier to meet your RDA of protein by consuming meat and dairy, but an excess of either can have a negative health impact. There are plenty of plant-based  protein options, but they generally contain less protein in a given serving. Ideally, a person should consume a mixture of meat, dairy, and plant-based foods in order to meet their RDA and have a balanced diet replete with nutrients.  If possible, consuming a variety of complete proteins is recommended. A complete protein is a protein that contains a good amount of each of the nine essential amino acids required in the human diet. Examples of complete protein foods or meals include:    Meat/Dairy examples  Eggs  Chicken breast  Cottage cheese  Greek yogurt  Milk  Lean beef  Tuna  Turkey breast  Fish  Shrimp    Vegan/plant-based examples  Buckwheat  Hummus and malu  Soy products (tofu, tempeh, edamame beans)  Peanut butter on toast or some other bread  Beans and rice  Quinoa  Hemp and steven seeds  Spirulina  Generally, meat, poultry, fish, eggs, and dairy products are complete protein sources. Nuts and seeds, legumes, grains, and vegetables, among other things, are usually incomplete proteins. There is nothing wrong with incomplete proteins however, and there are many healthy, high protein foods that are incomplete proteins. As long as you consume a sufficient variety of incomplete proteins to get all the required amino acids, it is not necessary to specifically eat complete protein foods. In fact, certain high fat red meats for example, a common source of complete proteins, can be unhealthy.   Below are some examples of high protein foods that are not complete proteins:  Almonds  Oats  Broccoli  Lentils  Isac bread  Steven seeds  Pumpkin seeds  Peanuts  [place] sprouts  Grapefruit  Green peas  Avocados  Mushrooms  As can be seen, there are many different foods a person can consume to meet their RDA of protein. The examples provided above do not constitute an exhaustive list of high protein or complete protein foods. As with everything else, balance is important, and the examples provided above are an attempt at  providing a list of healthier protein options (when consumed in moderation).    Amount of protein in common food      Protein Amount  Milk (1 cup/8 oz)  8 g  Egg (1 large/50 g)  6 g  Meat (1 slice / 2 oz)  14 g  Seafood (2 oz)  16 g  Bread (1 slice/64 g)   8 g  Corn (1 cup/166 g)  16 g  Rice (1 cup/195 g)  5 g  Dry Bean (1 cup/92 g)   16 g  Nuts (1 cup/92 g)    20 g  Fruits and Veggie (1 cup)  1 g    Data above taken from www.calculator.net    The Power of Protein:    High Protein Foods:  FISH  (3-6 ounces/meal)  All types of fish  Seafood (shrimp, scallops, clams, mussels, lobster)  EGGS     2-3 eggs/meal  DAIRY (2/3 to 1 ½ cup)  Cottage cheese   Greek yogurt  PLANTS (½-3/4 cup/meal)  Legumes: Dried beans and peas (black beans, henderson beans, garbanzo beans, kidney, cannellini, navy, split peas, black eyed peas)  Lentils  Quinoa  Soy (edamame, tofu)  PORK   (3-6 oz/meal)  Tenderloin  Pork chop  Top loin roast, boneless  Sirloin roast, boneless  Surinamese bowen (nitrate free)  Boiled deli ham (nitrate free)    Additional Protein Sources:    BEEF    (3-6 oz/meal)  Flank steak       Skirt steak  Bottom round(rump roast), select   Ground beef, 90% lean (ground sirloin)  Sven eye steak, choice  Eye of round roast, choice   POULTRY  (3-6 oz/meal)  Ground chicken or turkey  Chicken, no skin  Turkey, no skin  PROTEIN SHAKES: MIAN and Sana (plant based and dairy free), Corepower by AutoES[employer], Dwight (plant based option available), Premier Protein, JuicePlus Complete (www.juiceplus.com)  PROTEIN BARS: RXBAR, PowerCrunch, Quest, Barebells, Mosh      Nutrition and MyPlate: Vegetables  Vegetables are a major source of fiber. They’re also packed with vitamins needed for health and growth. At mealtimes, make half your plate fruits and vegetables.  Nutrient-rich choices  Fresh, frozen, or canned--all vegetables are high in nutrients. The color of the skin tells you what’s inside. So if you eat plenty of colors, you get a variety of  nutrients. Some good choices include:  Dark green vegetables, such as spinach, saran greens, kale, and broccoli.  Bright red and orange vegetables, such as carrots, sweet potatoes, red bell peppers, and tomatoes.  Starchy vegetables, such as potatoes and squash.  What makes vegetables less healthy?  Boiling vegetables causes some vitamins to escape into the water. To hold on to vitamins, briefly steam, sauté, stir-doan, or microwave instead. Overcooking destroys vitamins, so try to keep vegetables a little crispy.  Using a lot of margarine, butter, or salad dressing adds fat and calories, but not many nutrients. A small amount of these toppings is OK. But the more you add, the more fat you add, too.  Frozen vegetables that come with cheese sauce or other processed flavoring are high in fat and salt. It's healthier to season plain frozen vegetables yourself. Try fresh herbs, garlic, toasted almonds, or sesame seeds.  Canned vegetables often have lots of salt. Shop for low-sodium varieties.  One small change  Sneak vegetables into every meal. Shred carrots into hamburger, or add zucchini to spaghetti and meatballs. You won't even notice! Have a better idea? Write it here:  ________________________________________________________  Date Last Reviewed: 10/1/2017  © 7324-5191 [employer]. [address] [place] [address], [place] [address]. All rights reserved. This information is not intended as a substitute for professional medical care. Always follow your healthcare professional's instructions.      Medication use and SEs reviewed with patient.    Return in about 5 months (around 1/19/2025) for weight management via clinic.    Patient verbalizes understanding.

## 2024-08-24 LAB
SEX HORM BIND GLOB: 73.8 NMOL/L
TESTOST % FREE+WEAK BND: 8.9 %
TESTOST FREE+WEAK BND: 1.1 NG/DL
TESTOSTERONE TOT /MS: 12.3 NG/DL

## 2024-08-25 NOTE — PROGRESS NOTES
Bethany Barriga is a 48 year old female.    Chief Complaint   Patient presents with    Lesion     NO hx of skin ca.  Lesion of concern to the right jawline, denies bleeding or pain.     Hair/Scalp Problem     NEW pt presenting for hair loss to the scalp.  Area has been thinning, shedding, and balding over the last, pt has lost over 40 lbs.  Denies any treatment.   Vit b12 competed 8/2024, elevated             Omnicef [cefdinir] and Penicillins  Current Outpatient Medications   Medication Sig Dispense Refill    minoxidil 2.5 MG Oral Tab Take 0.5 tablets (1.25 mg total) by mouth daily. 15 tablet 2    lisinopril 10 MG Oral Tab TAKE 1 TABLET BY MOUTH EVERY DAY 90 tablet 0    Levonorgest-Eth Estrad 91-Day 0.15-0.03 &0.01 MG Oral Tab Take 1 tablet by mouth daily. 91 tablet 3    ketoconazole 2 % External Shampoo       mometasone 0.1 % External Ointment       Ferrous Sulfate ER (SLOW FE) 142 (45 Fe) MG Oral Tab CR Take by mouth every other day.  0    Cholecalciferol (VITAMIN D) 25 MCG (1000 UT) Oral Tab Take 1,000 Units by mouth daily.        Fluocinonide 0.05 % External Solution Apply once daily as needed.  Max 2 weeks. 60 mL 2    buPROPion  MG Oral Tablet 24 Hr Take 1 tablet (300 mg total) by mouth every morning. 90 tablet 1    Phentermine HCl 37.5 MG Oral Tab Take 1 tablet (37.5 mg total) by mouth every morning. 30 tablet 4      Past Medical History:    Abdominal hernia    Left inguinal hernia repaired in 2003 and i currently have a right inguinal hernia diagnosed in 2018 but not repaired yet    Blood in urine    Microscopic amounts noted in routine urine samples over the last few years    Calculus of kidney    2 small stones noted    Change in hair    Hair loss    Decorative tattoo    Diarrhea, unspecified    During heavy antibiotic use and now very often after removal of gallbladder 6/21    Esophageal reflux    Frequent bowel movements    Heartburn    Hemorrhoid    Hemorrhoids    High blood pressure    Human  papilloma virus infection    20 year ago per pt    Pain in joints    Knees    Pap smear for cervical cancer screening    negative hpv negative    PONV (postoperative nausea and vomiting)    Psoriasis    Stool incontinence    During heavy antibiotic use and now occasionally since gallbladder removal 6/21    Stress    I have a young adult son with severe mental health problems    Vertigo    Vertigo    Visual impairment    glasses    Wears glasses      Social History:  Social History     Socioeconomic History    Marital status:    Tobacco Use    Smoking status: Never     Passive exposure: Never    Smokeless tobacco: Never   Vaping Use    Vaping status: Never Used   Substance and Sexual Activity    Alcohol use: No    Drug use: No    Sexual activity: Not Currently     Birth control/protection: OCP   Other Topics Concern    Caffeine Concern No    Stress Concern No    Weight Concern No    Exercise Yes     Comment: Usually only once or twice per week    Seat Belt Yes    Grew up on a farm No    History of tanning Yes    Outdoor occupation No    Breast feeding No    Reaction to local anesthetic No    Pt has a pacemaker No    Pt has a defibrillator No                 Current Outpatient Medications   Medication Sig Dispense Refill    minoxidil 2.5 MG Oral Tab Take 0.5 tablets (1.25 mg total) by mouth daily. 15 tablet 2    lisinopril 10 MG Oral Tab TAKE 1 TABLET BY MOUTH EVERY DAY 90 tablet 0    Levonorgest-Eth Estrad 91-Day 0.15-0.03 &0.01 MG Oral Tab Take 1 tablet by mouth daily. 91 tablet 3    ketoconazole 2 % External Shampoo       mometasone 0.1 % External Ointment       Ferrous Sulfate ER (SLOW FE) 142 (45 Fe) MG Oral Tab CR Take by mouth every other day.  0    Cholecalciferol (VITAMIN D) 25 MCG (1000 UT) Oral Tab Take 1,000 Units by mouth daily.        Fluocinonide 0.05 % External Solution Apply once daily as needed.  Max 2 weeks. 60 mL 2    buPROPion  MG Oral Tablet 24 Hr Take 1 tablet (300 mg total) by  mouth every morning. 90 tablet 1    Phentermine HCl 37.5 MG Oral Tab Take 1 tablet (37.5 mg total) by mouth every morning. 30 tablet 4     Allergies:   Allergies   Allergen Reactions    Omnicef [Cefdinir] FACE FLUSHING    Penicillins FACE FLUSHING     Redness all over body, digestive issues and incontinence        Past Medical History:    Abdominal hernia    Left inguinal hernia repaired in 2003 and i currently have a right inguinal hernia diagnosed in 2018 but not repaired yet    Blood in urine    Microscopic amounts noted in routine urine samples over the last few years    Calculus of kidney    2 small stones noted    Change in hair    Hair loss    Decorative tattoo    Diarrhea, unspecified    During heavy antibiotic use and now very often after removal of gallbladder 6/21    Esophageal reflux    Frequent bowel movements    Heartburn    Hemorrhoid    Hemorrhoids    High blood pressure    Human papilloma virus infection    20 year ago per pt    Pain in joints    Knees    Pap smear for cervical cancer screening    negative hpv negative    PONV (postoperative nausea and vomiting)    Psoriasis    Stool incontinence    During heavy antibiotic use and now occasionally since gallbladder removal 6/21    Stress    I have a young adult son with severe mental health problems    Vertigo    Vertigo    Visual impairment    glasses    Wears glasses     Past Surgical History:   Procedure Laterality Date    Cholecystectomy  6/21    Colposcopy,bx cervix/endocerv curr  08/30/2021    Hernia surgery  10/01/2003    Left    Laparoscopic cholecystectomy  06/18/2021    Nasal scopy,remv part ethmoid  05/14/2021    Nasal scopy,rmv tiss maxill sinus Left 05/14/2021    Oral surgery      Other surgical history      Removal of ovarian cyst(s)  2007     Social History     Socioeconomic History    Marital status:      Spouse name: Not on file    Number of children: Not on file    Years of education: Not on file    Highest education level:  Not on file   Occupational History    Not on file   Tobacco Use    Smoking status: Never     Passive exposure: Never    Smokeless tobacco: Never   Vaping Use    Vaping status: Never Used   Substance and Sexual Activity    Alcohol use: No    Drug use: No    Sexual activity: Not Currently     Birth control/protection: OCP   Other Topics Concern     Service Not Asked    Blood Transfusions Not Asked    Caffeine Concern No    Occupational Exposure Not Asked    Hobby Hazards Not Asked    Sleep Concern Not Asked    Stress Concern No    Weight Concern No    Special Diet Not Asked    Back Care Not Asked    Exercise Yes     Comment: Usually only once or twice per week    Bike Helmet Not Asked    Seat Belt Yes    Self-Exams Not Asked    Grew up on a farm No    History of tanning Yes    Outdoor occupation No    Breast feeding No    Reaction to local anesthetic No    Pt has a pacemaker No    Pt has a defibrillator No   Social History Narrative    Not on file     Social Determinants of Health     Financial Resource Strain: Not on file   Food Insecurity: Not on file   Transportation Needs: Not on file   Physical Activity: Not on file   Stress: Not on file   Social Connections: Not on file   Housing Stability: Not on file     Family History   Problem Relation Age of Onset    Heart Disorder Father     Other (HTN) Father     Diabetes Father     Hypertension Father         Both parents    Other (HTN) Mother     Diabetes Maternal Grandfather     Breast Cancer Paternal Grandmother 40        In her 40's    Cancer Maternal Uncle         esophageal dx age 50s    Cancer Maternal Uncle         bile ducts possble liver, dx age 60s    Prostate Cancer Maternal Uncle         dx age 50s    Ovarian Cancer Neg     Uterine Cancer Neg     Pancreatic Cancer Neg     Colon Cancer Neg     Infertility Neg     Endometriosis Neg                       HPI :      Chief Complaint   Patient presents with    Lesion     NO hx of skin ca.  Lesion of  concern to the right jawline, denies bleeding or pain.     Hair/Scalp Problem     NEW pt presenting for hair loss to the scalp.  Area has been thinning, shedding, and balding over the last, pt has lost over 40 lbs.  Denies any treatment.   Vit b12 competed 8/2024, elevated       Patient presents with concerns above.    Past notes/ records and appropriate/relevant lab results including pathology and past body maps reviewed. Updated and new information noted in current visit.       ROS:    Denies any other systemic complaints.  No fevers, chills, night sweats, sensitivity to the sun, deeper lumps or bumps.  No other skin complaints.  History, medications, allergies as noted.    Physical examination: Patient  well-developed well-nourished, alert oriented in no acute distress.  Exam of involved, appropriate areas of skin performed, including scalp, head, neck, face,nails, hair, external eyes, including conjunctival mucosa, eyelids, lips, external ears, back, chest, abdomen, arms, legs, palms.  Remarkable for lesions as noted   See map for details     ASSESSMENT AND PLAN:     Encounter Diagnoses   Name Primary?    History of vitamin D deficiency Yes    Hair loss     Benign neoplasm of skin, unspecified location     Multiple nevi        Assessment / plan:    Patient right jawline benign-appearing nevus reassurance monitor.  Lentigines, no suspicious lesions    Hair loss.  Patient notes family history and mom thinner hair.  Patient has noted weight loss, irritation with minoxidil.  Using castor oil rosemary oil currently on supplemental.  Is taking biotin.  Will hold, check labs    Overall hair regimen discussed including B. vitamin supplementation, biotin to 10 mg daily-stop 3 days before any blood tests-, vitamin D3 2000 units daily, iron as appropriate, adequate protein intake (40 to 80 g daily), use of volumizing shampoos and antidandruff shampoos as appropriate.  Use of minoxidil 5% foam twice daily as tolerated if  patient desires.  The fact this may take 2-4 months to see any significant change discussed.  The fact regimen better and maintaining hair rather than regrowth reviewed  Patient not yet postmenopausal would not recommend DHT blocker prescription at this time.  Continue supportive care weight loss may have triggered telogen effluvium.  May take 6 months to see stabilization and hair shedding.    No evidence of scarring alopecia.  No inflammatory lesion  Pathophysiology discussed with patient.  Therapeutic options reviewed.  See  Medications in grid.  Instructions reviewed at length.     General skin care questions answered.   Reassurance regarding benign skin lesions.Signs and symptoms of skin cancer, ABCDE's of melanoma briefly reviewed.  Sunscreen use (broad-spectrum, ideally mineral, UVA UVB coverage SPF 30 or greater recommended), sun protection, encouraged.  Followup as noted in rtc or p.r.n.    Encounter Times new patient  Including precharting, reviewing chart, prior notes obtaining history: 10 minutes, medical exam :10 minutes, notes on body map, plan, counseling 10minutes My total time spent caring for the patient on the day of the encounter: 30 minutes     The patient indicates understanding of these issues and agrees to the plan.  The patient is asked to return as noted in follow-up /as noted above    This note was generated using Dragon voice recognition software.  Please contact me regarding any confusion resulting from errors in recognition.  Note to patient and family: The 21st Century Cures Act makes medical notes like these available to patients. However, be advised this is a medical document. It is intended as [id number] communication and monitoring of a patient's care needs. It is written in medical language and may contain abbreviations or verbiage that are unfamiliar. It may appear blunt or direct. Medical documents are intended to carry relevant information, facts as evident and the clinical  opinion of the practitioner.  Orders Placed This Encounter   Procedures    Ferritin    Vitamin D    Dehydroepiandrosterone Sulfate    Testosterone,Total and Weakly Bound w/ SHBG       Meds & Refills for this Visit:   Requested Prescriptions     Signed Prescriptions Disp Refills    minoxidil 2.5 MG Oral Tab 15 tablet 2     Sig: Take 0.5 tablets (1.25 mg total) by mouth daily.       Encounter Diagnoses   Name Primary?    History of vitamin D deficiency Yes    Hair loss     Benign neoplasm of skin, unspecified location     Multiple nevi        Orders Placed This Encounter   Procedures    Ferritin    Vitamin D    Dehydroepiandrosterone Sulfate    Testosterone,Total and Weakly Bound w/ SHBG       Results From Past 48 Hours:  No results found for this or any previous visit (from the past 48 hour(s)).    Meds This Visit:      Imaging Orders:  None     Referral Orders:  No orders of the defined types were placed in this encounter.

## 2024-09-11 ENCOUNTER — OFFICE VISIT (OUTPATIENT)
Dept: FAMILY MEDICINE CLINIC | Facility: CLINIC | Age: 49
End: 2024-09-11
Payer: COMMERCIAL

## 2024-09-11 VITALS
SYSTOLIC BLOOD PRESSURE: 112 MMHG | DIASTOLIC BLOOD PRESSURE: 60 MMHG | TEMPERATURE: 97 F | RESPIRATION RATE: 18 BRPM | HEART RATE: 90 BPM | WEIGHT: 153 LBS | HEIGHT: 64.49 IN | BODY MASS INDEX: 25.8 KG/M2

## 2024-09-11 DIAGNOSIS — Z71.85 VACCINE COUNSELING: ICD-10-CM

## 2024-09-11 DIAGNOSIS — E66.3 OVERWEIGHT (BMI 25.0-29.9): ICD-10-CM

## 2024-09-11 DIAGNOSIS — I10 BENIGN ESSENTIAL HYPERTENSION: Primary | ICD-10-CM

## 2024-09-11 PROCEDURE — 99214 OFFICE O/P EST MOD 30 MIN: CPT | Performed by: NURSE PRACTITIONER

## 2024-09-11 RX ORDER — LISINOPRIL 10 MG/1
10 TABLET ORAL DAILY
Qty: 90 TABLET | Refills: 0 | Status: SHIPPED | OUTPATIENT
Start: 2024-09-11

## 2024-09-11 NOTE — PROGRESS NOTES
Bethany Barriga is a 48 year old female.  HPI:   Medication check. She has been taking her medication as ordered. She denies any chest pain, shortness of breath, dizziness or lightheadedness. Seeing the weight loss clinic- Kylee Betts. She has lost 27 lbs since 01/2024.  Sees Derm- Dr. Dafne Franklin  Sees gyne- Dr. Mendez, on continuous pill.     Wt Readings from Last 6 Encounters:   09/11/24 153 lb (69.4 kg)   08/19/24 155 lb (70.3 kg)   08/07/24 156 lb (70.8 kg)   04/17/24 164 lb (74.4 kg)   04/05/24 167 lb (75.8 kg)   01/20/24 180 lb (81.6 kg)     Current Outpatient Medications   Medication Sig Dispense Refill    lisinopril 10 MG Oral Tab Take 1 tablet (10 mg total) by mouth daily. 90 tablet 0    buPROPion  MG Oral Tablet 24 Hr Take 1 tablet (300 mg total) by mouth every morning. 90 tablet 1    Phentermine HCl 37.5 MG Oral Tab Take 1 tablet (37.5 mg total) by mouth every morning. 30 tablet 4    minoxidil 2.5 MG Oral Tab Take 0.5 tablets (1.25 mg total) by mouth daily. 15 tablet 2    Levonorgest-Eth Estrad 91-Day 0.15-0.03 &0.01 MG Oral Tab Take 1 tablet by mouth daily. 91 tablet 3    ketoconazole 2 % External Shampoo       mometasone 0.1 % External Ointment       Ferrous Sulfate ER (SLOW FE) 142 (45 Fe) MG Oral Tab CR Take by mouth every other day.  0    Cholecalciferol (VITAMIN D) 25 MCG (1000 UT) Oral Tab Take 1,000 Units by mouth daily.        Fluocinonide 0.05 % External Solution Apply once daily as needed.  Max 2 weeks. 60 mL 2      Past Medical History:    Abdominal hernia    Left inguinal hernia repaired in 2003 and i currently have a right inguinal hernia diagnosed in 2018 but not repaired yet    Blood in urine    Microscopic amounts noted in routine urine samples over the last few years    Calculus of kidney    2 small stones noted    Change in hair    Hair loss    Decorative tattoo    Diarrhea, unspecified    During heavy antibiotic use and now very often after removal of gallbladder 6/21     Esophageal reflux    Frequent bowel movements    Heartburn    Hemorrhoid    Hemorrhoids    High blood pressure    Human papilloma virus infection    20 year ago per pt    Pain in joints    Knees    Pap smear for cervical cancer screening    negative hpv negative    PONV (postoperative nausea and vomiting)    Psoriasis    Stool incontinence    During heavy antibiotic use and now occasionally since gallbladder removal 6/21    Stress    I have a young adult son with severe mental health problems    Vertigo    Vertigo    Visual impairment    glasses    Wears glasses      Social History:  Social History     Socioeconomic History    Marital status:    Tobacco Use    Smoking status: Never     Passive exposure: Never    Smokeless tobacco: Never   Vaping Use    Vaping status: Never Used   Substance and Sexual Activity    Alcohol use: No    Drug use: No    Sexual activity: Not Currently     Birth control/protection: OCP   Other Topics Concern    Caffeine Concern No    Stress Concern No    Weight Concern No    Exercise Yes     Comment: Usually only once or twice per week    Seat Belt Yes    Grew up on a farm No    History of tanning Yes    Outdoor occupation No    Breast feeding No    Reaction to local anesthetic No    Pt has a pacemaker No    Pt has a defibrillator No        REVIEW OF SYSTEMS:   GENERAL HEALTH: feels well otherwise  RESPIRATORY: denies shortness of breath with exertion  CARDIOVASCULAR: denies chest pain on exertion  GI: denies abdominal pain  NEURO: denies headaches  Musculoskeletal: No motor deficits  EXAM:   /60   Pulse 90   Temp 97.2 °F (36.2 °C)   Resp 18   Ht 5' 4.49\" (1.638 m)   Wt 153 lb (69.4 kg)   BMI 25.87 kg/m²   GENERAL: well developed, well nourished,in no apparent distress   EYES: sclera clear without injection  NECK: supple,no adenopathy  LUNGS: clear to auscultation no rales, rhonchi or wheezes  CARDIO: RRR without murmur  EXTREMITIES: no cyanosis, clubbing or edema +2  posterior tibial pulses  Musculoskeletal: No gross deficit  Psychological: Mood and affect are normal.  Good communication skills.  ASSESSMENT AND PLAN:     Encounter Diagnoses   Name Primary?    Benign essential hypertension Yes    Overweight (BMI 25.0-29.9)     Vaccine counseling        1. Benign essential hypertension  - lisinopril 10 MG Oral Tab; Take 1 tablet (10 mg total) by mouth daily.  Dispense: 90 tablet; Refill: 0    2. Overweight (BMI 25.0-29.9)    3. Vaccine counseling       No orders of the defined types were placed in this encounter.      Meds & Refills for this Visit:  Requested Prescriptions     Signed Prescriptions Disp Refills    lisinopril 10 MG Oral Tab 90 tablet 0     Sig: Take 1 tablet (10 mg total) by mouth daily.       Imaging & Consults:  None    Follow Up with:  No follow-up provider specified.    Recent CMP- completed and reviewed.  Continue Lisinopril as ordered.   Continue to focus on healthy eating, routine exercise, weight loss. Body mass index is 25.87 kg/m².  Continue follow up with weight loss clinic.  Due for Tdap- discussed with pt today.      The patient indicates understanding of these issues and agrees to the plan.  The patient is asked to return in  6 months.

## 2024-10-13 DIAGNOSIS — Z51.81 ENCOUNTER FOR THERAPEUTIC DRUG MONITORING: ICD-10-CM

## 2024-10-13 DIAGNOSIS — Z86.39 HISTORY OF OBESITY: ICD-10-CM

## 2024-10-13 DIAGNOSIS — E66.3 OVERWEIGHT (BMI 25.0-29.9): ICD-10-CM

## 2024-10-14 RX ORDER — BUPROPION HYDROCHLORIDE 150 MG/1
150 TABLET ORAL EVERY MORNING
Qty: 90 TABLET | Refills: 1 | OUTPATIENT
Start: 2024-10-14

## 2024-10-14 NOTE — TELEPHONE ENCOUNTER
Requesting bupropion  LOV: 8/19/24  RTC:  5 months  Filled: 8/19/24 #90 with 1 refills    Future Appointments   Date Time Provider Department Center   1/27/2025  9:40 AM Kylee Betts APRN [id number]     Denied has refill

## 2024-11-13 ENCOUNTER — PATIENT MESSAGE (OUTPATIENT)
Dept: DERMATOLOGY CLINIC | Facility: CLINIC | Age: 49
End: 2024-11-13

## 2024-11-13 ENCOUNTER — PATIENT MESSAGE (OUTPATIENT)
Dept: INTERNAL MEDICINE CLINIC | Facility: CLINIC | Age: 49
End: 2024-11-13

## 2024-11-13 DIAGNOSIS — Z51.81 ENCOUNTER FOR THERAPEUTIC DRUG MONITORING: Primary | ICD-10-CM

## 2024-11-13 DIAGNOSIS — Z86.39 HISTORY OF OBESITY: ICD-10-CM

## 2024-11-13 DIAGNOSIS — E66.3 OVERWEIGHT (BMI 25.0-29.9): ICD-10-CM

## 2024-11-14 RX ORDER — MINOXIDIL 2.5 MG/1
2.5 TABLET ORAL DAILY
Qty: 30 TABLET | Refills: 2 | Status: SHIPPED | OUTPATIENT
Start: 2024-11-14

## 2024-11-14 NOTE — TELEPHONE ENCOUNTER
LOV 8/15/24 - Dr. Franklin - please see message from pt.  Pt still experiencing shedding and states it is even worse.  Please advise.  Thank you.

## 2024-11-14 NOTE — TELEPHONE ENCOUNTER
Last visit 8/19/24 was increased to bupropion  mg  Wants to decrease back to 150 mg - please see note.  Will you approve    Future Appointments   Date Time Provider Department Center   1/27/2025  9:40 AM Kylee Betts APRN [id number]

## 2024-11-15 NOTE — TELEPHONE ENCOUNTER
Yes, ok to increase to 1 tab daily.  It does take 3-4 months to see how this is working,and there can be more shedding with new hairs pushing the resting hairs out of the follicles. Ok to send new prescription for 2.5mg tab 1 po every day. Prescription sent. Possible further increase in facial hair.

## 2024-11-17 RX ORDER — BUPROPION HYDROCHLORIDE 150 MG/1
150 TABLET ORAL DAILY
Qty: 90 TABLET | Refills: 0 | Status: SHIPPED | OUTPATIENT
Start: 2024-11-17

## 2024-11-18 RX ORDER — MINOXIDIL 2.5 MG/1
1.25 TABLET ORAL DAILY
Qty: 45 TABLET | Refills: 0 | OUTPATIENT
Start: 2024-11-18

## 2024-11-18 NOTE — TELEPHONE ENCOUNTER
Current refill request refused due to refill is either a duplicate request or has active refills at the pharmacy.  Check previous templates.    Requested Prescriptions     Refused Prescriptions Disp Refills    MINOXIDIL 2.5 MG Oral Tab [Pharmacy Med Name: MINOXIDIL 2.5 MG TABLET] 45 tablet 0     Sig: TAKE 0.5 TABLETS BY MOUTH DAILY.     Refused By: MONICA LONG     Reason for Refusal: Request already responded to by other means (e.g. phone or fax)

## 2024-11-20 ENCOUNTER — IMMUNIZATION (OUTPATIENT)
Dept: LAB | Age: 49
End: 2024-11-20
Attending: EMERGENCY MEDICINE
Payer: COMMERCIAL

## 2024-11-20 DIAGNOSIS — Z23 NEED FOR VACCINATION: Primary | ICD-10-CM

## 2024-11-20 PROCEDURE — 90656 IIV3 VACC NO PRSV 0.5 ML IM: CPT

## 2024-11-20 PROCEDURE — 90471 IMMUNIZATION ADMIN: CPT

## 2024-12-31 ENCOUNTER — OFFICE VISIT (OUTPATIENT)
Dept: FAMILY MEDICINE CLINIC | Facility: CLINIC | Age: 49
End: 2024-12-31
Payer: COMMERCIAL

## 2024-12-31 VITALS
WEIGHT: 160 LBS | DIASTOLIC BLOOD PRESSURE: 69 MMHG | OXYGEN SATURATION: 99 % | SYSTOLIC BLOOD PRESSURE: 130 MMHG | TEMPERATURE: 98 F | HEART RATE: 96 BPM | HEIGHT: 65 IN | RESPIRATION RATE: 18 BRPM | BODY MASS INDEX: 26.66 KG/M2

## 2024-12-31 DIAGNOSIS — R09.82 POST-NASAL DRAINAGE: Primary | ICD-10-CM

## 2024-12-31 DIAGNOSIS — J02.9 SORE THROAT: ICD-10-CM

## 2024-12-31 LAB
CONTROL LINE PRESENT WITH A CLEAR BACKGROUND (YES/NO): YES YES/NO
KIT LOT #: NORMAL NUMERIC
STREP GRP A CUL-SCR: NEGATIVE

## 2024-12-31 PROCEDURE — 99213 OFFICE O/P EST LOW 20 MIN: CPT | Performed by: NURSE PRACTITIONER

## 2024-12-31 PROCEDURE — 87880 STREP A ASSAY W/OPTIC: CPT | Performed by: NURSE PRACTITIONER

## 2024-12-31 NOTE — PROGRESS NOTES
CHIEF COMPLAINT:     Chief Complaint   Patient presents with    Sore Throat     Sore throat x6 days, cough,headache   Denies OTC medication         HPI:   Bethany Barriga is a 49 year old female presents to clinic with complaint of sore throat. Patient has had for 6 days. Patient reports following associated symptoms:post nasal drainage.     Patient denies shortness of breath.      Current Outpatient Medications   Medication Sig Dispense Refill    minoxidil 2.5 MG Oral Tab Take 1 tablet (2.5 mg total) by mouth daily. 30 tablet 2    buPROPion  MG Oral Tablet 24 Hr Take 1 tablet (150 mg total) by mouth daily. 90 tablet 0    lisinopril 10 MG Oral Tab Take 1 tablet (10 mg total) by mouth daily. 90 tablet 0    Phentermine HCl 37.5 MG Oral Tab Take 1 tablet (37.5 mg total) by mouth every morning. 30 tablet 4    Levonorgest-Eth Estrad 91-Day 0.15-0.03 &0.01 MG Oral Tab Take 1 tablet by mouth daily. 91 tablet 3    ketoconazole 2 % External Shampoo       mometasone 0.1 % External Ointment       Ferrous Sulfate ER (SLOW FE) 142 (45 Fe) MG Oral Tab CR Take by mouth every other day.  0    Cholecalciferol (VITAMIN D) 25 MCG (1000 UT) Oral Tab Take 1,000 Units by mouth daily.        Fluocinonide 0.05 % External Solution Apply once daily as needed.  Max 2 weeks. 60 mL 2      Past Medical History:    Abdominal hernia    Left inguinal hernia repaired in 2003 and i currently have a right inguinal hernia diagnosed in 2018 but not repaired yet    Blood in urine    Microscopic amounts noted in routine urine samples over the last few years    Calculus of kidney    2 small stones noted    Change in hair    Hair loss    Decorative tattoo    Diarrhea, unspecified    During heavy antibiotic use and now very often after removal of gallbladder 6/21    Esophageal reflux    Frequent bowel movements    Heartburn    Hemorrhoid    Hemorrhoids    High blood pressure    Human papilloma virus infection    20 year ago per pt    Pain in joints     Knees    Pap smear for cervical cancer screening    negative hpv negative    PONV (postoperative nausea and vomiting)    Psoriasis    Stool incontinence    During heavy antibiotic use and now occasionally since gallbladder removal 6/21    Stress    I have a young adult son with severe mental health problems    Vertigo    Vertigo    Visual impairment    glasses    Wears glasses      Social History:  Social History     Socioeconomic History    Marital status:    Tobacco Use    Smoking status: Never     Passive exposure: Never    Smokeless tobacco: Never   Vaping Use    Vaping status: Never Used   Substance and Sexual Activity    Alcohol use: No    Drug use: No    Sexual activity: Not Currently     Birth control/protection: OCP   Other Topics Concern    Caffeine Concern No    Stress Concern No    Weight Concern No    Exercise Yes     Comment: Usually only once or twice per week    Seat Belt Yes    Grew up on a farm No    History of tanning Yes    Outdoor occupation No    Breast feeding No    Reaction to local anesthetic No    Pt has a pacemaker No    Pt has a defibrillator No        REVIEW OF SYSTEMS:   GENERAL HEALTH: feels well otherwise, decreased appetite  SKIN: denies any unusual skin lesions or rashes currently reports that she has a rash on her face earlier this week which resolved after 1 day.   HEENT: denies ear pain, See HPI  RESPIRATORY: denies shortness of breath or wheezing  CARDIOVASCULAR: denies chest pain or palpitations   GI: denies vomiting or diarrhea  NEURO: denies dizziness or lightheadedness    EXAM:   /69   Pulse 96   Temp 97.8 °F (36.6 °C)   Resp 18   Ht 5' 5\" (1.651 m)   Wt 160 lb (72.6 kg)   SpO2 99%   BMI 26.63 kg/m²   GENERAL: well developed, well nourished,in no apparent distress  SKIN: no rashes,no suspicious lesions  HEAD: atraumatic, normocephalic  EYES: conjunctiva clear, EOM intact  EARS: TM's cloudy, non-injected, no bulging, retraction. Scant fluid  bilaterally  NOSE: nostrils patent, no exudates, nasal mucosa pink and noninflamed  THROAT: oral mucosa pink, moist. Posterior pharynx erythematous and injected. no exudates, uvula midline, airway open, visible post nasal drainage with cobblestoning.    NECK: supple, non-tender  LUNGS: clear to auscultation bilaterally, no wheezes or rhonchi. Breathing is non labored.  CARDIO: RRR without murmur  EXTREMITIES: no cyanosis, clubbing or edema  LYMPH: No anterior cervical  lymphadenopathy.    Recent Results (from the past 24 hours)   Rapid Strep    Collection Time: 12/31/24  1:41 PM   Result Value Ref Range    Strep Grp A Screen Negative Negative    Control Line Present with a clear background (yes/no) Yes Yes/No    Kit Lot # [id number] Numeric    Kit Expiration Date 11/4/25 Date         ASSESSMENT AND PLAN:   Assessment: Bethany was seen today for a sore throat for 6 days, is negative on rapid strep. Has visible post nasal drainage with cobblestoning present. Pharyngitis likely due to post nasal drainage either from allergies or URI.   Bethany was seen today for sore throat.    Diagnoses and all orders for this visit:    Post-nasal drainage    Sore throat  -     Rapid Strep          Plan:   Discussed that due to symptoms and negative rapid strep this is most likely viral and does not require antibiotics.   Throat culture declined.   Pt agrees to use nasal saline and salt water gargles and daily allergy pill for relief. If  no relief in 1 week to follow up with PCP.   Please remember that illnesses can change quickly, and although it is not felt that your symptoms currently require further treatment at the ER if you symptoms do worsen, change or if new symptoms were to develop please seek emergent care at the nearest ER.    Comfort measures explained and discussed as listed in Patient Instructions  Ibuprofen or tylenol otc as needed for pain  Follow up in 3-5 days if not improving, condition worsens, or fever greater than or  equal to 100.4 persists for 72 hours.    The patient/parent indicates understanding of these issues and agrees to the plan.  The patient is asked to follow up with their PCP as needed.

## 2025-01-16 ENCOUNTER — OFFICE VISIT (OUTPATIENT)
Dept: DERMATOLOGY CLINIC | Facility: CLINIC | Age: 50
End: 2025-01-16

## 2025-01-16 DIAGNOSIS — L65.9 HAIR LOSS: Primary | ICD-10-CM

## 2025-01-16 PROCEDURE — 99213 OFFICE O/P EST LOW 20 MIN: CPT | Performed by: PHYSICIAN ASSISTANT

## 2025-01-16 NOTE — PROGRESS NOTES
HPI:    Patient ID: Bethany Barriga is a 49 year old female.    Patient presents for follow-up on hair loss. Patient is currently taking minoxidil 2.5 mg daily with no results. No draining or tenderness noted. Sees hair loss in the shower, brushing, around her house and clothing. Is not using ketoconazole shampoo consistently. She is not taking her iron or vitamin D consistently as well. Does have family hx of hair loss. Used rogaine in the past with no results. Didn't like the burning sensation. She notes her mother is taking spironolactone and is wondering if she would be a good candidate for this. Hx of allergies to medications ntoed.         Review of Systems   Constitutional:  Negative for chills and fever.   Musculoskeletal:  Negative for arthralgias and myalgias.   Skin:  Positive for rash. Negative for color change and wound.            Current Outpatient Medications   Medication Sig Dispense Refill    buPROPion  MG Oral Tablet 24 Hr Take 1 tablet (150 mg total) by mouth daily. 90 tablet 0    minoxidil 2.5 MG Oral Tab Take 1 tablet (2.5 mg total) by mouth daily. 30 tablet 2    lisinopril 10 MG Oral Tab Take 1 tablet (10 mg total) by mouth daily. 90 tablet 0    Phentermine HCl 37.5 MG Oral Tab Take 1 tablet (37.5 mg total) by mouth every morning. 30 tablet 4    Levonorgest-Eth Estrad 91-Day 0.15-0.03 &0.01 MG Oral Tab Take 1 tablet by mouth daily. 91 tablet 3    ketoconazole 2 % External Shampoo       mometasone 0.1 % External Ointment       Ferrous Sulfate ER (SLOW FE) 142 (45 Fe) MG Oral Tab CR Take by mouth every other day.  0    Cholecalciferol (VITAMIN D) 25 MCG (1000 UT) Oral Tab Take 1,000 Units by mouth daily.        Fluocinonide 0.05 % External Solution Apply once daily as needed.  Max 2 weeks. 60 mL 2     Allergies:Allergies[1]   There were no vitals taken for this visit.  There is no height or weight on file to calculate BMI.  PHYSICAL EXAM:   Physical Exam  Constitutional:       General: She  is not in acute distress.     Appearance: Normal appearance.   Skin:     General: Skin is warm and dry.      Findings: Rash present.      Comments: Scaling noted throughout the scalp. Global thinning of the scalp noted as well. No draining or tenderness noted. No erythema noted.    Neurological:      Mental Status: She is alert and oriented to person, place, and time.                ASSESSMENT/PLAN:   1. Hair loss  -After discussion with patient, advised the following:  -Continue minoxidil for now at 2.5 mg   -She is not noticing hair growth in any area   -Will see if she should increase to 5 mg vs starting aldactone  -Will discuss with dermatologist.   -To call or follow-up with worsening symptoms or concerns  -Patient was agreeable to plan and will comply with discussion above.         No orders of the defined types were placed in this encounter.      Meds This Visit:  Requested Prescriptions      No prescriptions requested or ordered in this encounter       Imaging & Referrals:  None         ID#[id number]       [1]   Allergies  Allergen Reactions    Omnicef [Cefdinir] FACE FLUSHING    Penicillins FACE FLUSHING     Redness all over body, digestive issues and incontinence

## 2025-01-17 RX ORDER — MINOXIDIL 2.5 MG/1
2.5 TABLET ORAL DAILY
Qty: 30 TABLET | Refills: 2 | Status: SHIPPED | OUTPATIENT
Start: 2025-01-17

## 2025-01-17 RX ORDER — SPIRONOLACTONE 25 MG/1
25 TABLET ORAL DAILY
Qty: 30 TABLET | Refills: 3 | Status: SHIPPED | OUTPATIENT
Start: 2025-01-17

## 2025-01-17 NOTE — TELEPHONE ENCOUNTER
Frandy - pt called back to state she would like to start the spironolactone.  Pt also asking for a refill of the minoxidil as well please.  Both to the [facility] listed in pt's chart.  Rx's pended.  Thank you.

## 2025-01-17 NOTE — TELEPHONE ENCOUNTER
----- Message from Dafne Franklin sent at 1/16/2025  9:43 PM CST -----  Sonido Wise,  Yes ok to add spironolactone- I would start with 25mg and she could increase after 1-2 mos.   I would suggest adding the spironolactone first for a few weeks before increasing the minoxidil, ok to increase to 5mg.  Caution with BP, still avoid pregnancy, she may have more facial hair growth with increasing the minoxidil.  Thanks,  K.  ----- Message -----  From: Frandy Luis PA-C  Sent: 1/16/2025  10:48 AM CST  To: MD Dr. Rigoberto Dudley,     I saw this patient for follow-up on hair loss. She states that she is taking minoxidil 2.5 mg. Not consistent with ketoconazole shampoo, iron or vitamin D. She has not no improvement. She has not noticed hair growth anywhere else either. She states her mom started aldactone and wants to start that. However, her testosterone levles were normal when you checked in August. I told her she is still not a candidate for finasteride as she still gets her cycle.     Would you like me to stop minoxidil and start aldactone, or have her continue with minoxidil and potentially increase dose as patient wants to see if increasing dose will help?     Sincerely,   Frandy Luis PA-C

## 2025-01-17 NOTE — TELEPHONE ENCOUNTER
LMTCB. See if patient is willing to continue with minoxidil at 2.5 mg for now and then I can add spironolactone at 25 mg to start off with it and then later we can increase her minoxidil to 5 mg daily. If she is agreeable to this, then I can send the spironolactone.

## 2025-01-27 ENCOUNTER — OFFICE VISIT (OUTPATIENT)
Dept: INTERNAL MEDICINE CLINIC | Facility: CLINIC | Age: 50
End: 2025-01-27
Payer: COMMERCIAL

## 2025-01-27 VITALS
WEIGHT: 153 LBS | SYSTOLIC BLOOD PRESSURE: 112 MMHG | HEIGHT: 64.5 IN | RESPIRATION RATE: 16 BRPM | HEART RATE: 96 BPM | BODY MASS INDEX: 25.8 KG/M2 | DIASTOLIC BLOOD PRESSURE: 74 MMHG

## 2025-01-27 DIAGNOSIS — I10 PRIMARY HYPERTENSION: ICD-10-CM

## 2025-01-27 DIAGNOSIS — E66.3 OVERWEIGHT (BMI 25.0-29.9): ICD-10-CM

## 2025-01-27 DIAGNOSIS — Z86.39 HISTORY OF OBESITY: ICD-10-CM

## 2025-01-27 DIAGNOSIS — Z51.81 ENCOUNTER FOR THERAPEUTIC DRUG MONITORING: Primary | ICD-10-CM

## 2025-01-27 DIAGNOSIS — E88.819 INSULIN RESISTANCE: ICD-10-CM

## 2025-01-27 PROBLEM — K21.9 GASTROESOPHAGEAL REFLUX DISEASE: Status: ACTIVE | Noted: 2025-01-27

## 2025-01-27 PROCEDURE — 99213 OFFICE O/P EST LOW 20 MIN: CPT | Performed by: NURSE PRACTITIONER

## 2025-01-27 RX ORDER — BUPROPION HYDROCHLORIDE 300 MG/1
300 TABLET ORAL DAILY
Qty: 90 TABLET | Refills: 1 | Status: SHIPPED | OUTPATIENT
Start: 2025-01-27

## 2025-01-27 RX ORDER — PHENTERMINE HYDROCHLORIDE 37.5 MG/1
37.5 TABLET ORAL EVERY MORNING
Qty: 30 TABLET | Refills: 4 | Status: SHIPPED | OUTPATIENT
Start: 2025-01-27

## 2025-01-27 NOTE — PATIENT INSTRUCTIONS
Continue making lifestyle changes that focus on good nutrition, regular exercise and stress management.    Medication Plan: Continue current medication regimen. Restarting Phentermine at 1/2 tab daily for 7 days with option to then increase to full tab.    Next steps to work on before next office visit include: Keep up the great work! 40# lost.    Start and/or continue tracking nutrition to remain accountable for both quality and quantity of food. Recommend setting weekly weight loss goal to 1.5-2# and caution adding your exercise, as the nitza may overestimate your daily calories. Use a food scale, measuring cups and spoons. Purchase serving dishes that are 1/4 cup, 1/2 cup and 1 cup servings. Use an nitza such as BackType (www.mynetdiary.com), [employer], or LoseIT! to provide accountably, structure and support. Consider a follow up with Ranjan, our dietician, to help modify and/or support your nutrition plan for weight loss as well as maintenance.    I recommend self monitoring to support behavior change and to learn how your environment individually impacts YOUR body. This will help promote intuitive eating practices. Goal is to track nutrition 2x/week via paper log or using an nitza such as BackType (www.mynetdiary.com) or LoseIT!. I also advise checking and logging a weekly home scale weight. Support via our counseling and dietician teams are available with a referral. Please let me know if this individualized care is desired.      Self-Monitoring - The Way to Successful Weight Management    By Ermelinda Corey RD, FELIX, Rody Christian RD, FELIX, Asad Ernst PA-C, and Cherise Reina MD    [facility] www.obesityaction.org Winter 2008 Resource    One major and possibly most important behavioral interventional strategy for weight management and lifestyle change is self-monitoring. Behavioral interventions are a central aspect in treatments to promote lifestyle changes that lead to weight-loss, prevent weight gain or weight  regain and improve physical fitness. In the past, self-monitoring has unfortunately been one of the least popular techniques for those in weight management programs, and in some cases it is even thought of as a punishment. Because self-monitoring is critical for success with lifestyle changes, it is important to look at the various self monitoring techniques.    What is Self-monitoring?  Self-monitoring refers to the observing and recording of eating and exercise patterns, followed by feedback on the behaviors. The goal of self-monitoring is to increase self-awareness of target behaviors and outcomes, thus it can serve as an early warning system if problems are arising and can help track success. Some commonly used self-monitoring techniques include:    Food diaries  Regular self-weighing  Exercise logs  Equipment such as pedometers, accelerometers and metabolic devices  Food Logs and Diaries  One of the most common and important types of self-monitoring strategies in weight management programs is keeping a food log, in which individuals record foods, exercises or beverages as soon as they are consumed.    One important technique with food logs is individuals recording what they eat or drink as it is consumed; otherwise it may not give an accurate account of the day’s intake. A good “rule of thumb” for food logs is: “if you bite it, you write it!”    The minimum information for weight-loss that should be kept in food logs is type, amount and caloric content of food or beverage consumed. This provides the ability to track and balance the number of calories consumed throughout the day with the amount of calories expended throughout the day.    Other nutritional information that can be logged includes: time of day of eating, fat content and carbohydrate grams. Disease-specific food logs can also be kept. For example: focusing on carbohydrate content instead of calories for patients with diabetes or insulin  resistance.    Food Diaries  Another helpful tool in self-monitoring is keeping a food diary. Food diaries differ from food logs because they include more detailed information. They are useful if you are trying to find behavioral reasons or psychological aspects for eating.    Depending on the person and behavioral complexities involved, some food diaries could include the stress level, mood or feelings surrounding eating, activity or location or other environmental or emotional triggers for eating. The more complex or detailed, the better the feedback.    However, in today’s society it is almost impossible for most people to keep highly detailed daily food records over the long-term, therefore, compliance is often very low with detailed food diaries. By suggesting that patients keep a detailed food record for a few days each week, perhaps major areas of focus for nutritional and behavioral intervention can be recognized.    Logging Your Food Online  Online food logs and diaries or computer software are quick and convenient ways to keep records of foods consumed in our technologically advanced world. Many Web sites are available for tracking of foods and calories throughout the day, some of which are free and very easy to use.    You can look up food choices and/or alternative choices in online databases of more than 50,000 foods. Internet-savvy loggers may choose to keep their journals online. Others may just choose to use these databases as a more convenient way of looking up nutritional value of foods. Some free online diaries include:    Free Web sites for searching nutritional information are available, an example is www.Ooploo.eCommHub. These Web sites may also offer exercise tracking and ideas, support, motivational tips and chat or discussion rooms.    Hand-held Calorie Counters  Another option for those who are “on the go” are handheld devices for calorie counting. Some of the devices are stand-alone such  as Mobile Broadcast Network® or HealthFitCounter®. Others need to connect to Web sites. Other devices are installed in your Palm or Pocket-PC such as Diet Diary by [employer]. They let you download updates when nutrition facts change, however, some of them use a lot of memory.    Regular Weighing  Weighing yourself is an important and simple self-monitoring behavior to serve as reminder of one’s eating and physical activity habits. Although it may be hard and sometimes discouraging to weigh yourself while losing weight, it is recommended to weigh yourself weekly, preferably outside of the home on the same scale.    Using the scale at the local gym or exercise facility or your doctor’s office may be more accurate than home scales. However, if this is unrealistic, it is okay to use a home scale. Try to weigh yourself at the same time of day and the same day of the week.    Writing down your weekly weights on a table, graph or calendar can help you keep track of your success or to help you get back on track more quickly. It is important to note that weighing yourself more frequently than weekly is not recommended, as day to day fluctuations are not indicators of actual weight. Regular monitoring of your weight is also essential to help you maintain your weight after losing weight.    Exercise Logs  Another self-monitoring technique, along the same lines as food logs and diaries, is keeping an exercise log or diary. The number of minutes engaged and type and level of exertion of physical activity should ideally be recorded.    An important and often forgotten aspect of exercise logs is the level of perceived exertion. Walking for 30 minutes, at an easy compared to a hard pace, will result in different levels of calories burned and cardiovascular impact.    Typically, an easy physical activity that does not increase heart rate much, or alter breathing would usually be the pace that you walk around work or go shopping. Moderate  level of physical exertion is when you are getting a mildly increased heart and breathing rate. Heavy or hard level of physical exertion would be sweating, increased heart rate (target heart rate range) as well as increased breathing.    Remember, physical activity can be done at one time or intermittently throughout the day. Logging exercise can be a positive feedback or a reminder to incorporate more exercise or physical activity into your daily routine.    Initial activities may be walking, riding a stationary bike or swimming at a slow pace. Other types of exercise that can be fun are dancing, exercise videos or chair exercises. You should try to aim for 30 minutes of exercise on most days of the week.    Many people try to start out with exercise on three or four days of the week. However, if you can get yourself exercising most to all days of the week, even if only for 10 or 15 minutes, it will become more of a routine for you.    Healthy Lifestyle Tip  All adults should set a long-term goal to accumulate at least 30 minutes or more of moderate-intensity physical activity on most to all days of the week. Also, try to increase activities of daily living such as taking the stairs instead of the elevator, parking further away or walking to a bathroom that is further from your desk. Reducing sedentary time is a good strategy to increase activity by undertaking frequent, less strenuous activities. With time, you may be able to engage in more strenuous activities.    Pedometers  Self-monitoring tools are becoming more and more popular and accurate. One of the simplest of these self-monitoring tools is a pedometer. Pedometers give objective data of physical activity throughout the day. Pedometers can be found in almost any consumer catalog or retail store.    Some of the more popular manufactures include Digi-Walker, [employer], [employer], [place], Skopeo.fr, Kanchufang, Freestyle, Appnomic Systems, PhotoThera and many  others. [employer] and [employer] make pedometer of speedometer devices that calculate steps and speed using GPS. These clip-on devices are inexpensive, ranging from less than $15 up to $75.    Many people get an average of 3,000 steps per day with daily activity. In order to burn off extra calories for weight-loss, walking 10,000 steps per day is recommended. For regular health, a minimum of 6,000 steps per day is required. Research suggests that a deliberate walk of 4,000-6,000 steps will help with weight-loss. It is often a good idea to keep track of your daily steps taken in your exercise log.    Pedometers can be frustrating for those who are more interested in distance traveled. Focusing on the number of steps and ways to incorporate more steps throughout the day will make as much of a difference with weight-loss as actual distance does. Pedometers encourage people to find ways to add more steps throughout the day.    Because step counting is becoming more popular, advances are being made in the technology behind pedometers. New pedometers display steps and count them accurately. They are meant to be worn everyday and all day, as motivation to keep stepping, Most are small and comfortable to wear.    Pedometers sense your body motion, counting your footsteps usually by a turned pendulum technology, a coiled spring mechanism and a hairspring mechanism (which is the least accurate). The unit should be accurate in its count when you wear it correctly. You may need to experiment with where to wear it. You can measure your stride and then the pedometer can estimate distance traveled.    Some pedometers today offer multifunction options like calorie estimates, clocks, timers, stopwatches, speed estimators, seven-day memory or pulse rate readers, voice feedback and radios.    Accelerometers  Although pedometers are very cost-effective, one of the main flaws in using pedometers, however, is that they do not record intensity  (how hard) or duration (how long) or frequency (how often) movement occurs. Accelerometers are devices that can objectively measure frequency, duration and intensity of physical activity.    Accelerometers provide a high level of accuracy when assessing physical activity. There are a variety of commercially available accelerometers, or activity monitors, which come in a wide range of prices anywhere from $50 to $1,000. Bio[employer]er and [employer] are examples of affordable accelerometers.    Many of the more expensive accelerometers are used only in research or as a part of a hospital-based program. These monitors are more complex than pedometers in that they display and store more complex data. Some are designed to download to a computer for analysis of intensity levels, movements and physical activity patterns. They can also be used to estimate calories burned or energy expenditure.    Accelerometers have sophisticated sensors that convert physical movement into an electrical signal that is relative to the muscular force needed to produce the work. Accelerometers can be found in uniaxial or triaxial measures. Uniaxial accelerometers measure in a single plane and can be attached to the trunk or limbs. Triaxial accelerometers measure along three planes: vertical, medial-lateral and anterior-posterior.    Although accelerometers are a step up from pedometers in accuracy of physical activity, they cannot register resistance. Therefore, if you are strength training or adding resistance to your bike or treadmill or adding an incline to your walking, it will not be able to discern the added level of energy required to do that work.    Metabolic Devices  One of the most accurate and most expensive tools for self-monitoring are tools that have very sophisticated monitoring and interpreting sensors for calories burned. Many of these devices have options to subscribe to a Web-based calorie counter system that integrates the amount  of calories burned measured by the equipment and your calories consumed that you enter in easy to use food logs.    These devices are more accurate in measurements of calories expended because they use not only accelerometer technology, but also heat flux sensors, galvanic skin response (to measure physical exertion and emotional stimuli) and skin temperature gauges. Some also include heart rate monitoring techniques. All of these technologies combined lead to a very accurate measurement of calories expended throughout the day.    These devices can determine if you are sitting, sleeping, jogging, walking, lifting weights or riding in a car. Many of these devices are very expensive and used primarily for research, however, some are available commercially.    This technology is also employed by hospital-based programs. Patients wear the hospital’s armband and track their nutrition on the Web site or computer-based program for typically one to two weeks. When they return to the clinic, the information will be uploaded and the practitioners will be able to work with the patients with objective data on metabolic lifestyle patterns.    Practitioners can also monitor patients on integrated software applications to provide consultations without being face to face. Practitioners have the ability to set daily goals to tailor programs to the individual patient. These are great tools to help objectively monitor behavior and physical activity, as well as providing real time feed back to the patient. [employer] is one company that offers this technology.    Conclusion  Although specific diseases and treatments vary, behavior modification is the major key in weight-loss or prevention and decreases the risk of diseases. Self-monitoring is a key to behavior modifications, and there are a multitude of ways to self-monitor. With technology advancements, self-monitoring techniques are changing and improving to help defeat some of the  major barriers to compliance. The bottom line is that no matter how you do it, self-monitoring should be an important part of your weight-loss, weight maintenance or healthy lifestyle change. Then, the next step is to be sure the self-monitoring translates into positive behavior changes with regards to diet and exercise.      Exercise: Why Fitness Matters  Starting an exercise program can take time to develop into a habit, typically 6-8 weeks. Begin at your own pace.  A lifetime of fitness offers many benefits like:  Decreasing your risk of health problems, such as heart disease, high blood pressure, diabetes, and some types of cancer.  Managing your weight.  Helping you sleep better.  Preventing or relieving stress, depression, and back problems  Boosting your energy.  You need to continue exercising to keep these benefits. Your main goal is to make fitness a lifetime commitment. Build a fitness plan that you can stick with. Choose activities you like. Go slowly, especially when just starting out. Work up to being active 30 minutes on most days. Aim for a total of 150 or more minutes a week.  Why be fit?  People who are physically fit:  Are more alert and productive.  Have more energy, both physically and mentally.  Handle stress better.  Sleep better.  Are less prone to injury.     © 9967-5401 [employer] [address] [place] [address], [place] [address]. All rights reserved. This information is not intended as a substitute for professional medical care. Always follow your healthcare professional's instructions.      Significant Weight-loss & Plastic Surgery    by MARNI Matt MD, FACS, [facility], [facility]    Obesity Action Coalition Spring 2015  https://www.obesityaction.org/resources/significant-weight-loss-plastic-surgery/    As patients shed pounds every day during their weight-loss journey, many people wonder if they will “need” reconstructive plastic surgery to restore their body shape. The question  seems straightforward, however, the answer is quite complex.    There is no magical age, perfect bariatric surgery, nor exact number of pounds lost throughout an exact period of time, which can guarantee whether you will or will not “need” reconstructive plastic surgery. The true answer to who will “need” reconstructive plastic surgery involves many variables.    Understanding the Skin  The ability for skin to recoil at all is truly amazing. This incredible ability is due in part to the elastic fibers within skin. During childhood, this skin stretching ability allows us to grow without having to “shed” our outer skin. The contractile forces of skin also allow skin to bounce back after such natural things as childbirth.    Skin possesses naturally occurring elastic fibers which act similar to the elastic fibers in a rubber band. The effect of weight gain on skin is similar to the effect of pulling a rubber band. With enough constant stretch applied, the fibers in the rubber band become disrupted or break.    With weight gain, the skin is similarly stretched and the elastic fibers are disrupted. After weight-loss or after removing the load from the rubber band, the elastic fibers contract, but only so much. In either case, the result is a reflection of the ability of the elastic fibers to completely contract. The amount of skin recoil is multi-factorial. There are major and minor factors involved. The two major factors influencing skin recoil are the amount of skin stretch and skin’s age.    Natural Factors  With weight gain, there is stretching of the skin. With weight-loss, the skin tries to recoil. Unfortunately, the elastic qualities of the skin are not perfect and the skin does not completely recoil.    Factors Impacting the Inability of Skin to Recoil:    Age significantly influences skin recoil. Younger skin has better elastic qualities and thus, has more inherent ability to “spring” back.  Other factors  include genetics, age, the rapidity of weight-loss, and the amount of stretch.  Unfortunately, most of these factors cannot be influenced.    Factors that can positively influence skin recoil include:    Avoiding sun exposure  Quitting smoking  Losing weight throughout a “longer” period of time    Typically, the more significant the stretch on the skin, the less likely the skin will shrink completely. Thus, the more weight you lose, the more likely you will “need” reconstructive plastic surgery.    With massive weight-loss, skin folds develop. These skin folds can harbor bacteria and cause chronic skin infections. In turn, these chronic skin infections can cause severe skin problems including odors, boils, skin breakdown and even bleeding. Typically, these patients “need” reconstructive plastic surgery.    Financial Factors  For some, reconstructive plastic surgery can be very expensive. If finances are an issue, please seek a Board-certified plastic surgeon who is willing to submit to your medical insurance for reconstructive plastic surgery. Since every state is different and every insurance policy is different, it is worth attempting to get your reconstructive surgery covered by your medical insurance. Fortunately, many medical insurance companies cover reconstructive plastic surgery after massive weight-loss. If your medical insurance can cover your surgery, this can influence your “need” of reconstructive plastic surgery.    Psychiatric Factors  After massive weight-loss, some patients feel worse. They see all the rolls of excess skin and question why they lost all the weight. For these patients, reconstructive plastic surgery may be even more important than the bariatric surgery. These patients “need” reconstructive plastic surgery to feel complete and whole.    Conclusion  Only you will know if you “need” reconstructive plastic surgery. No one else should make the decision for you. Everyone has their own  personal reasons for undergoing reconstructive plastic surgery. Some patients do not know they will “need” reconstructive plastic surgery until they have lost all their weight. Some patients do not know until years later, when their skin fails to retract. Some patients “need” reconstructive plastic surgery to eliminate skin rashes. Some patients “need” reconstructive plastic surgery to remove excessive skin to exercise effectively. Some patients are content with their appearance, whereas others feel the “need” to have the excess skin and fat removed to feel “normal.”    After massive weight-loss, due to the remaining skin and fat, some patients feel incomplete and “need” reconstructive plastic surgery to “complete” their weight-loss journey. Whatever the reason, reconstructive plastic surgery after massive weight-loss is a very personal decision. Only you will know if you “need” reconstructive plastic surgery.

## 2025-01-27 NOTE — PROGRESS NOTES
Bethany Barriga is a 49 year old female presents today for follow-up on medical weight loss program for the treatment of overweight, obesity, or morbid obesity with associated HTN, IR.    S:  Current weight   Wt Readings from Last 6 Encounters:   01/27/25 153 lb (69.4 kg)   12/31/24 160 lb (72.6 kg)   09/11/24 153 lb (69.4 kg)   08/19/24 155 lb (70.3 kg)   08/07/24 156 lb (70.8 kg)   04/17/24 164 lb (74.4 kg)    AND BMI Body mass index is 25.86 kg/m²..    Patient has lost 2# since LOV 5 months ago.  She has been compliant with therapy, but off phentermine d/t lapse in follow up. Was on a lower dose Wellbutrin XL, but now back to 300 mg daily. No lifestyle log completed. Has not started exercising and no tracking of nutrition. Consider plastic surgery for excess abdominal skin. Saw Dr. Hoffman in the past for consult. Started Minoxidil for hair thinning.    Testing/consult completed since LOV: Dietician: Ranjan 7/12/23: Estimated caloric needs for weight loss: 1600 cals/d for 1 pounds/week weight loss.    Social hx and PMH reviewed. 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    REVIEW OF SYSTEMS:  GENERAL: feels well otherwise  LUNGS: denies shortness of breath with exertion  CARDIOVASCULAR: denies chest pain on exertion, denies palpitations or pedal edema  GI: denies abdominal pain.  No N/V/D/C  MUSCULOSKELETAL: no acute joint or muscle pain  NEURO: denies headaches  PSYCH: denies change in behavior or mood, denies feeling sad or depressed    EXAM:  /74   Pulse 96   Resp 16   Ht 5' 4.5\" (1.638 m)   Wt 153 lb (69.4 kg)   BMI 25.86 kg/m²   GENERAL: well developed, well nourished, in no apparent distress, overweight  EYES: conjunctiva pink, sclera non icteric, PERRLA  LUNGS: CTA in all fields, breathing non labored  CARDIO: RRR without murmur, normal S1 and S2 without clicks or gallops, no pedal edema.  GI: +BS  NEURO/MS: motor and sensory grossly intact  PSYCH: pleasant,  cooperative, normal mood and affect    ASSESSMENT AND PLAN:  Reviewed Initial Weight Data and Goal Weight Loss:       Encounter Diagnoses   Name Primary?    Encounter for therapeutic drug monitoring Yes    Overweight (BMI 25.0-29.9)     Primary hypertension     Insulin resistance     History of obesity            No orders of the defined types were placed in this encounter.      Meds & Refills for this Visit:  Requested Prescriptions     Signed Prescriptions Disp Refills    buPROPion  MG Oral Tablet 24 Hr 90 tablet 1     Sig: Take 1 tablet (300 mg total) by mouth daily.    Phentermine HCl 37.5 MG Oral Tab 30 tablet 4     Sig: Take 1 tablet (37.5 mg total) by mouth every morning.       Imaging & Consults:  OP REFERRAL TO [facility] ([facility] USE ONLY)      Plan:  Patient has lost 2# since LOV 5 months ago on phentermine 37.5 mg daily, Wellbutrin  mg daily with a total weight loss of 40# since initial consult on 6/22/23 with initial weight of 193#.  Weight loss goal: 150#. CPM, restart phentermine as directed. Hx of naltrexone with SEs of visual changes. Topamax with SEs of menstrual irregularities.  on tracking nutrition, self monitoring, fitness, considerations for plastic surgery and recommend f/u with Ranjan. See patient instructions below for additional plans and patient counseling.      Patient Instructions   Continue making lifestyle changes that focus on good nutrition, regular exercise and stress management.    Medication Plan: Continue current medication regimen. Restarting Phentermine at 1/2 tab daily for 7 days with option to then increase to full tab.    Next steps to work on before next office visit include: Keep up the great work! 40# lost.    Start and/or continue tracking nutrition to remain accountable for both quality and quantity of food. Recommend setting weekly weight loss goal to 1.5-2# and caution adding your exercise, as the nitza may overestimate your daily calories. Use a  food scale, measuring cups and spoons. Purchase serving dishes that are 1/4 cup, 1/2 cup and 1 cup servings. Use an nitza such as Think1stBoxing.com (www.mynetdiary.com), [employer], or LoseIT! to provide accountably, structure and support. Consider a follow up with Ranjan, our dietician, to help modify and/or support your nutrition plan for weight loss as well as maintenance.    I recommend self monitoring to support behavior change and to learn how your environment individually impacts YOUR body. This will help promote intuitive eating practices. Goal is to track nutrition 2x/week via paper log or using an nitza such as Think1stBoxing.com (www.mynetdiary.com) or LoseIT!. I also advise checking and logging a weekly home scale weight. Support via our counseling and dietician teams are available with a referral. Please let me know if this individualized care is desired.      Self-Monitoring - The Way to Successful Weight Management    By Ermelinda Corey RD, DOMON, Rody Christian RD, FELIX, Asad Ernst PA-C, and Cherise Reina MD    [facility]AC www.obesityaction.org Winter 2008 Resource    One major and possibly most important behavioral interventional strategy for weight management and lifestyle change is self-monitoring. Behavioral interventions are a central aspect in treatments to promote lifestyle changes that lead to weight-loss, prevent weight gain or weight regain and improve physical fitness. In the past, self-monitoring has unfortunately been one of the least popular techniques for those in weight management programs, and in some cases it is even thought of as a punishment. Because self-monitoring is critical for success with lifestyle changes, it is important to look at the various self monitoring techniques.    What is Self-monitoring?  Self-monitoring refers to the observing and recording of eating and exercise patterns, followed by feedback on the behaviors. The goal of self-monitoring is to increase self-awareness of target  behaviors and outcomes, thus it can serve as an early warning system if problems are arising and can help track success. Some commonly used self-monitoring techniques include:    Food diaries  Regular self-weighing  Exercise logs  Equipment such as pedometers, accelerometers and metabolic devices  Food Logs and Diaries  One of the most common and important types of self-monitoring strategies in weight management programs is keeping a food log, in which individuals record foods, exercises or beverages as soon as they are consumed.    One important technique with food logs is individuals recording what they eat or drink as it is consumed; otherwise it may not give an accurate account of the day’s intake. A good “rule of thumb” for food logs is: “if you bite it, you write it!”    The minimum information for weight-loss that should be kept in food logs is type, amount and caloric content of food or beverage consumed. This provides the ability to track and balance the number of calories consumed throughout the day with the amount of calories expended throughout the day.    Other nutritional information that can be logged includes: time of day of eating, fat content and carbohydrate grams. Disease-specific food logs can also be kept. For example: focusing on carbohydrate content instead of calories for patients with diabetes or insulin resistance.    Food Diaries  Another helpful tool in self-monitoring is keeping a food diary. Food diaries differ from food logs because they include more detailed information. They are useful if you are trying to find behavioral reasons or psychological aspects for eating.    Depending on the person and behavioral complexities involved, some food diaries could include the stress level, mood or feelings surrounding eating, activity or location or other environmental or emotional triggers for eating. The more complex or detailed, the better the feedback.    However, in today’s society it is  almost impossible for most people to keep highly detailed daily food records over the long-term, therefore, compliance is often very low with detailed food diaries. By suggesting that patients keep a detailed food record for a few days each week, perhaps major areas of focus for nutritional and behavioral intervention can be recognized.    Logging Your Food Online  Online food logs and diaries or computer software are quick and convenient ways to keep records of foods consumed in our technologically advanced world. Many Web sites are available for tracking of foods and calories throughout the day, some of which are free and very easy to use.    You can look up food choices and/or alternative choices in online databases of more than 50,000 foods. Internet-savvy loggers may choose to keep their journals online. Others may just choose to use these databases as a more convenient way of looking up nutritional value of foods. Some free online diaries include:    Free Web sites for searching nutritional information are available, an example is www.The Local. These Web sites may also offer exercise tracking and ideas, support, motivational tips and chat or discussion rooms.    Hand-held Calorie Counters  Another option for those who are “on the go” are handheld devices for calorie counting. Some of the devices are stand-alone such as CalorieSmart® or HealthFitCounter®. Others need to connect to Web sites. Other devices are installed in your Palm or Pocket-PC such as Diet Diary by Couple. They let you download updates when nutrition facts change, however, some of them use a lot of memory.    Regular Weighing  Weighing yourself is an important and simple self-monitoring behavior to serve as reminder of one’s eating and physical activity habits. Although it may be hard and sometimes discouraging to weigh yourself while losing weight, it is recommended to weigh yourself weekly, preferably outside of the home on the  same scale.    Using the scale at the local gym or exercise facility or your doctor’s office may be more accurate than home scales. However, if this is unrealistic, it is okay to use a home scale. Try to weigh yourself at the same time of day and the same day of the week.    Writing down your weekly weights on a table, graph or calendar can help you keep track of your success or to help you get back on track more quickly. It is important to note that weighing yourself more frequently than weekly is not recommended, as day to day fluctuations are not indicators of actual weight. Regular monitoring of your weight is also essential to help you maintain your weight after losing weight.    Exercise Logs  Another self-monitoring technique, along the same lines as food logs and diaries, is keeping an exercise log or diary. The number of minutes engaged and type and level of exertion of physical activity should ideally be recorded.    An important and often forgotten aspect of exercise logs is the level of perceived exertion. Walking for 30 minutes, at an easy compared to a hard pace, will result in different levels of calories burned and cardiovascular impact.    Typically, an easy physical activity that does not increase heart rate much, or alter breathing would usually be the pace that you walk around work or go shopping. Moderate level of physical exertion is when you are getting a mildly increased heart and breathing rate. Heavy or hard level of physical exertion would be sweating, increased heart rate (target heart rate range) as well as increased breathing.    Remember, physical activity can be done at one time or intermittently throughout the day. Logging exercise can be a positive feedback or a reminder to incorporate more exercise or physical activity into your daily routine.    Initial activities may be walking, riding a stationary bike or swimming at a slow pace. Other types of exercise that can be fun are  dancing, exercise videos or chair exercises. You should try to aim for 30 minutes of exercise on most days of the week.    Many people try to start out with exercise on three or four days of the week. However, if you can get yourself exercising most to all days of the week, even if only for 10 or 15 minutes, it will become more of a routine for you.    Healthy Lifestyle Tip  All adults should set a long-term goal to accumulate at least 30 minutes or more of moderate-intensity physical activity on most to all days of the week. Also, try to increase activities of daily living such as taking the stairs instead of the elevator, parking further away or walking to a bathroom that is further from your desk. Reducing sedentary time is a good strategy to increase activity by undertaking frequent, less strenuous activities. With time, you may be able to engage in more strenuous activities.    Pedometers  Self-monitoring tools are becoming more and more popular and accurate. One of the simplest of these self-monitoring tools is a pedometer. Pedometers give objective data of physical activity throughout the day. Pedometers can be found in almost any [employer] or retail store.    Some of the more popular manufactures include DigiiSOCOWalker, Plango, Tripl, Catacel, meQuilibrium, [employer], Freestyle, Neo Networks, [employer] and many others. [employer] and [employer] make pedometer of speedometer devices that calculate steps and speed using GPS. These clip-on devices are inexpensive, ranging from less than $15 up to $75.    Many people get an average of 3,000 steps per day with daily activity. In order to burn off extra calories for weight-loss, walking 10,000 steps per day is recommended. For regular health, a minimum of 6,000 steps per day is required. Research suggests that a deliberate walk of 4,000-6,000 steps will help with weight-loss. It is often a good idea to keep track of your daily steps taken in your exercise  log.    Pedometers can be frustrating for those who are more interested in distance traveled. Focusing on the number of steps and ways to incorporate more steps throughout the day will make as much of a difference with weight-loss as actual distance does. Pedometers encourage people to find ways to add more steps throughout the day.    Because step counting is becoming more popular, advances are being made in the technology behind pedometers. New pedometers display steps and count them accurately. They are meant to be worn everyday and all day, as motivation to keep stepping, Most are small and comfortable to wear.    Pedometers sense your body motion, counting your footsteps usually by a turned pendulum technology, a coiled spring mechanism and a hairspring mechanism (which is the least accurate). The unit should be accurate in its count when you wear it correctly. You may need to experiment with where to wear it. You can measure your stride and then the pedometer can estimate distance traveled.    Some pedometers today offer multifunction options like calorie estimates, clocks, timers, stopwatches, speed estimators, seven-day memory or pulse rate readers, voice feedback and radios.    Accelerometers  Although pedometers are very cost-effective, one of the main flaws in using pedometers, however, is that they do not record intensity (how hard) or duration (how long) or frequency (how often) movement occurs. Accelerometers are devices that can objectively measure frequency, duration and intensity of physical activity.    Accelerometers provide a high level of accuracy when assessing physical activity. There are a variety of commercially available accelerometers, or activity monitors, which come in a wide range of prices anywhere from $50 to $1,000. BioTrainer and [employer] are examples of affordable accelerometers.    Many of the more expensive accelerometers are used only in research or as a part of a hospital-based  program. These monitors are more complex than pedometers in that they display and store more complex data. Some are designed to download to a computer for analysis of intensity levels, movements and physical activity patterns. They can also be used to estimate calories burned or energy expenditure.    Accelerometers have sophisticated sensors that convert physical movement into an electrical signal that is relative to the muscular force needed to produce the work. Accelerometers can be found in uniaxial or triaxial measures. Uniaxial accelerometers measure in a single plane and can be attached to the trunk or limbs. Triaxial accelerometers measure along three planes: vertical, medial-lateral and anterior-posterior.    Although accelerometers are a step up from pedometers in accuracy of physical activity, they cannot register resistance. Therefore, if you are strength training or adding resistance to your bike or treadmill or adding an incline to your walking, it will not be able to discern the added level of energy required to do that work.    Metabolic Devices  One of the most accurate and most expensive tools for self-monitoring are tools that have very sophisticated monitoring and interpreting sensors for calories burned. Many of these devices have options to subscribe to a Web-based calorie counter system that integrates the amount of calories burned measured by the equipment and your calories consumed that you enter in easy to use food logs.    These devices are more accurate in measurements of calories expended because they use not only accelerometer technology, but also heat flux sensors, galvanic skin response (to measure physical exertion and emotional stimuli) and skin temperature gauges. Some also include heart rate monitoring techniques. All of these technologies combined lead to a very accurate measurement of calories expended throughout the day.    These devices can determine if you are sitting,  sleeping, jogging, walking, lifting weights or riding in a car. Many of these devices are very expensive and used primarily for research, however, some are available commercially.    This technology is also employed by hospital-based programs. Patients wear the hospital’s armband and track their nutrition on the Web site or computer-based program for typically one to two weeks. When they return to the clinic, the information will be uploaded and the practitioners will be able to work with the patients with objective data on metabolic lifestyle patterns.    Practitioners can also monitor patients on integrated software applications to provide consultations without being face to face. Practitioners have the ability to set daily goals to tailor programs to the individual patient. These are great tools to help objectively monitor behavior and physical activity, as well as providing real time feed back to the patient. [employer] is one company that offers this technology.    Conclusion  Although specific diseases and treatments vary, behavior modification is the major key in weight-loss or prevention and decreases the risk of diseases. Self-monitoring is a key to behavior modifications, and there are a multitude of ways to self-monitor. With technology advancements, self-monitoring techniques are changing and improving to help defeat some of the major barriers to compliance. The bottom line is that no matter how you do it, self-monitoring should be an important part of your weight-loss, weight maintenance or healthy lifestyle change. Then, the next step is to be sure the self-monitoring translates into positive behavior changes with regards to diet and exercise.      Exercise: Why Fitness Matters  Starting an exercise program can take time to develop into a habit, typically 6-8 weeks. Begin at your own pace.  A lifetime of fitness offers many benefits like:  Decreasing your risk of health problems, such as heart disease,  high blood pressure, diabetes, and some types of cancer.  Managing your weight.  Helping you sleep better.  Preventing or relieving stress, depression, and back problems  Boosting your energy.  You need to continue exercising to keep these benefits. Your main goal is to make fitness a lifetime commitment. Build a fitness plan that you can stick with. Choose activities you like. Go slowly, especially when just starting out. Work up to being active 30 minutes on most days. Aim for a total of 150 or more minutes a week.  Why be fit?  People who are physically fit:  Are more alert and productive.  Have more energy, both physically and mentally.  Handle stress better.  Sleep better.  Are less prone to injury.     © 3617-6314 [employer]. [address] [place] [address], [place] [address]. All rights reserved. This information is not intended as a substitute for professional medical care. Always follow your healthcare professional's instructions.      Significant Weight-loss & Plastic Surgery    by MARNI Matt MD, FACS, [facility], [facility]    Obesity Action Coalition Spring 2015  https://www.obesityaction.org/resources/significant-weight-loss-plastic-surgery/    As patients shed pounds every day during their weight-loss journey, many people wonder if they will “need” reconstructive plastic surgery to restore their body shape. The question seems straightforward, however, the answer is quite complex.    There is no magical age, perfect bariatric surgery, nor exact number of pounds lost throughout an exact period of time, which can guarantee whether you will or will not “need” reconstructive plastic surgery. The true answer to who will “need” reconstructive plastic surgery involves many variables.    Understanding the Skin  The ability for skin to recoil at all is truly amazing. This incredible ability is due in part to the elastic fibers within skin. During childhood, this skin stretching ability allows us to grow  without having to “shed” our outer skin. The contractile forces of skin also allow skin to bounce back after such natural things as childbirth.    Skin possesses naturally occurring elastic fibers which act similar to the elastic fibers in a rubber band. The effect of weight gain on skin is similar to the effect of pulling a rubber band. With enough constant stretch applied, the fibers in the rubber band become disrupted or break.    With weight gain, the skin is similarly stretched and the elastic fibers are disrupted. After weight-loss or after removing the load from the rubber band, the elastic fibers contract, but only so much. In either case, the result is a reflection of the ability of the elastic fibers to completely contract. The amount of skin recoil is multi-factorial. There are major and minor factors involved. The two major factors influencing skin recoil are the amount of skin stretch and skin’s age.    Natural Factors  With weight gain, there is stretching of the skin. With weight-loss, the skin tries to recoil. Unfortunately, the elastic qualities of the skin are not perfect and the skin does not completely recoil.    Factors Impacting the Inability of Skin to Recoil:    Age significantly influences skin recoil. Younger skin has better elastic qualities and thus, has more inherent ability to “spring” back.  Other factors include genetics, age, the rapidity of weight-loss, and the amount of stretch.  Unfortunately, most of these factors cannot be influenced.    Factors that can positively influence skin recoil include:    Avoiding sun exposure  Quitting smoking  Losing weight throughout a “longer” period of time    Typically, the more significant the stretch on the skin, the less likely the skin will shrink completely. Thus, the more weight you lose, the more likely you will “need” reconstructive plastic surgery.    With massive weight-loss, skin folds develop. These skin folds can harbor bacteria and  cause chronic skin infections. In turn, these chronic skin infections can cause severe skin problems including odors, boils, skin breakdown and even bleeding. Typically, these patients “need” reconstructive plastic surgery.    Financial Factors  For some, reconstructive plastic surgery can be very expensive. If finances are an issue, please seek a Board-certified plastic surgeon who is willing to submit to your medical insurance for reconstructive plastic surgery. Since every state is different and every insurance policy is different, it is worth attempting to get your reconstructive surgery covered by your medical insurance. Fortunately, many medical insurance companies cover reconstructive plastic surgery after massive weight-loss. If your medical insurance can cover your surgery, this can influence your “need” of reconstructive plastic surgery.    Psychiatric Factors  After massive weight-loss, some patients feel worse. They see all the rolls of excess skin and question why they lost all the weight. For these patients, reconstructive plastic surgery may be even more important than the bariatric surgery. These patients “need” reconstructive plastic surgery to feel complete and whole.    Conclusion  Only you will know if you “need” reconstructive plastic surgery. No one else should make the decision for you. Everyone has their own personal reasons for undergoing reconstructive plastic surgery. Some patients do not know they will “need” reconstructive plastic surgery until they have lost all their weight. Some patients do not know until years later, when their skin fails to retract. Some patients “need” reconstructive plastic surgery to eliminate skin rashes. Some patients “need” reconstructive plastic surgery to remove excessive skin to exercise effectively. Some patients are content with their appearance, whereas others feel the “need” to have the excess skin and fat removed to feel “normal.”    After massive  weight-loss, due to the remaining skin and fat, some patients feel incomplete and “need” reconstructive plastic surgery to “complete” their weight-loss journey. Whatever the reason, reconstructive plastic surgery after massive weight-loss is a very personal decision. Only you will know if you “need” reconstructive plastic surgery.        Medication use and SEs reviewed with patient.    Return in about 5 months (around 6/27/2025) for weight management via Telemedicine Visit and in clinic as scheduled.    Patient verbalizes understanding.

## 2025-02-13 RX ORDER — MINOXIDIL 2.5 MG/1
2.5 TABLET ORAL DAILY
Qty: 90 TABLET | Refills: 0 | OUTPATIENT
Start: 2025-02-13

## 2025-02-13 NOTE — TELEPHONE ENCOUNTER
Current refill request refused due to refill is either a duplicate request or has active refills at the pharmacy.  Check previous templates.    Requested Prescriptions     Refused Prescriptions Disp Refills    MINOXIDIL 2.5 MG Oral Tab [Pharmacy Med Name: MINOXIDIL 2.5 MG TABLET] 90 tablet 0     Sig: TAKE 1 TABLET BY MOUTH EVERY DAY     Refused By: MONICA LONG     Reason for Refusal: Request already responded to by other means (e.g. phone or fax)

## 2025-02-15 DIAGNOSIS — E66.3 OVERWEIGHT (BMI 25.0-29.9): ICD-10-CM

## 2025-02-15 DIAGNOSIS — Z51.81 ENCOUNTER FOR THERAPEUTIC DRUG MONITORING: ICD-10-CM

## 2025-02-15 DIAGNOSIS — Z86.39 HISTORY OF OBESITY: ICD-10-CM

## 2025-02-17 RX ORDER — BUPROPION HYDROCHLORIDE 150 MG/1
150 TABLET ORAL DAILY
Qty: 90 TABLET | Refills: 0 | OUTPATIENT
Start: 2025-02-17

## 2025-02-17 NOTE — TELEPHONE ENCOUNTER
Requesting bupropion 150  LOV: 1/27/25  RTC: 5 months  Filled: 1/27/25 #90 with 1 refills    Future Appointments   Date Time Provider Department Center   4/30/2025 10:30 AM Dafne Amezquita MD [facility] [id number]   6/3/2025  2:40 PM Kylee Betts APRN [facility] [address]   8/18/2025  2:20 PM Kylee Betts APRN [facility] [id number]     Patient has lost 2# since LOV 5 months ago on phentermine 37.5 mg daily, Wellbutrin  mg daily with a total weight loss of 40# since initial consult on 6/22/23 with initial weight of 193#.  Weight loss goal: 150#. CPM, restart phentermine as directed.     Medication Plan: Continue current medication regimen. Restarting Phentermine at 1/2 tab daily for 7 days with option to then increase to full tab.       Patient should be taking bupropion at 300?     Too soon for refills has 90 day refill

## 2025-02-27 DIAGNOSIS — I10 BENIGN ESSENTIAL HYPERTENSION: ICD-10-CM

## 2025-02-27 RX ORDER — LISINOPRIL 10 MG/1
10 TABLET ORAL DAILY
Qty: 90 TABLET | Refills: 0 | Status: SHIPPED | OUTPATIENT
Start: 2025-02-27

## 2025-02-27 NOTE — TELEPHONE ENCOUNTER
Last office visit: 9/11//24  Next office visit: 4/30/25  Last Refill: 9/11/24    Requested Prescriptions     Pending Prescriptions Disp Refills    lisinopril 10 MG Oral Tab 90 tablet 0     Sig: Take 1 tablet (10 mg total) by mouth daily.       Please authorize if acceptable.   Thank you!

## 2025-04-02 ENCOUNTER — TELEPHONE (OUTPATIENT)
Facility: LOCATION | Age: 50
End: 2025-04-02

## 2025-04-18 NOTE — TELEPHONE ENCOUNTER
Refill Request for medication(s): SPIRONOLACTONE 25 MG Oral Tab     Last Office Visit: 01/16/25    Last Refill: 01/17/25    Pharmacy, Dosage verified: [facility]ACY #[id number] - [facility] [place] [address] [phone], [phone]     Condition Update (if applicable): mychart msg sent.    Rx pended and sent to provider for approval, please advise. Thank You!

## 2025-04-21 RX ORDER — SPIRONOLACTONE 25 MG/1
25 TABLET ORAL DAILY
Qty: 90 TABLET | Refills: 1 | OUTPATIENT
Start: 2025-04-21

## 2025-04-21 NOTE — TELEPHONE ENCOUNTER
She can stay on the minoxidil at 1.25 mg as that is recommended dose for women. I would not increase it further for now until she has taken it for 6 months and not seen significant improvement.

## 2025-04-30 ENCOUNTER — OFFICE VISIT (OUTPATIENT)
Dept: FAMILY MEDICINE CLINIC | Facility: CLINIC | Age: 50
End: 2025-04-30
Payer: COMMERCIAL

## 2025-04-30 VITALS
TEMPERATURE: 97 F | BODY MASS INDEX: 26.29 KG/M2 | DIASTOLIC BLOOD PRESSURE: 78 MMHG | RESPIRATION RATE: 18 BRPM | HEART RATE: 96 BPM | SYSTOLIC BLOOD PRESSURE: 122 MMHG | WEIGHT: 154 LBS | HEIGHT: 64 IN

## 2025-04-30 DIAGNOSIS — L21.0 DANDRUFF: ICD-10-CM

## 2025-04-30 DIAGNOSIS — I10 BENIGN ESSENTIAL HYPERTENSION: ICD-10-CM

## 2025-04-30 DIAGNOSIS — R43.8 BAD TASTE IN MOUTH: ICD-10-CM

## 2025-04-30 DIAGNOSIS — R09.82 POSTNASAL DRIP: ICD-10-CM

## 2025-04-30 DIAGNOSIS — Z00.00 WELLNESS EXAMINATION: Primary | ICD-10-CM

## 2025-04-30 DIAGNOSIS — Z59.41 FOOD INSECURITY: ICD-10-CM

## 2025-04-30 PROCEDURE — 99396 PREV VISIT EST AGE 40-64: CPT | Performed by: STUDENT IN AN ORGANIZED HEALTH CARE EDUCATION/TRAINING PROGRAM

## 2025-04-30 PROCEDURE — 99214 OFFICE O/P EST MOD 30 MIN: CPT | Performed by: STUDENT IN AN ORGANIZED HEALTH CARE EDUCATION/TRAINING PROGRAM

## 2025-04-30 RX ORDER — KETOCONAZOLE 20 MG/ML
SHAMPOO, SUSPENSION TOPICAL
Refills: 0 | Status: CANCELLED | OUTPATIENT
Start: 2025-04-30

## 2025-04-30 RX ORDER — KETOCONAZOLE 20 MG/ML
1 SHAMPOO, SUSPENSION TOPICAL
Qty: 120 ML | Refills: 1 | Status: SHIPPED | OUTPATIENT
Start: 2025-05-01

## 2025-04-30 RX ORDER — LISINOPRIL 10 MG/1
10 TABLET ORAL DAILY
Qty: 90 TABLET | Refills: 1 | Status: SHIPPED | OUTPATIENT
Start: 2025-04-30

## 2025-04-30 RX ORDER — OMEPRAZOLE 40 MG/1
40 CAPSULE, DELAYED RELEASE ORAL DAILY
Qty: 30 CAPSULE | Refills: 0 | Status: SHIPPED | OUTPATIENT
Start: 2025-04-30

## 2025-04-30 RX ORDER — FLUTICASONE PROPIONATE 50 MCG
2 SPRAY, SUSPENSION (ML) NASAL DAILY
Qty: 16 ML | Refills: 0 | Status: SHIPPED | OUTPATIENT
Start: 2025-04-30

## 2025-04-30 SDOH — ECONOMIC STABILITY - FOOD INSECURITY: FOOD INSECURITY: Z59.41

## 2025-04-30 NOTE — PATIENT INSTRUCTIONS
Refill policies:      Allow 3 business days for refills; controlled substances may take longer.  Contact your pharmacy at least 5-7 business days prior to running out of medication and have them send an electronic request or submit through the \"request refill\" option thru your [employer] account. No need to do both, as multiple requests will create an automated [employer] message to notify of a denial for one of the duplicated requests, causing you undue confusion.   Refills are NOT addressed on weekends; covering physicians do not authorize routine medications on weekends.  No narcotics or controlled substances are refilled after noon on Fridays or by on call physicians.  By law, narcotics cannot be faxed or phoned into your pharmacy.  If your prescription is due for a refill, you may be due for a follow up appointment. Please call our office at [phone] to make an appointment or schedule an appointment via [employer].  To best provide you care, patients receiving routine medications need to be seen at least twice a year. Patients receiving narcotic/controlled substance medications need to be seen at least once every 3 months.  In the event that your preferred pharmacy does not have the requested medication in stock (ie Backordered), it is your responsibility to find another pharmacy that has the requested medication available. We will gladly send a new prescription to that pharmacy at your request.  controlled substances may not be able to be filled out of state due to license restrictions.  If you have a planned trip, it's best to call your pharmacy at least 5-7 business days to prevent any delays in your medication refill.    Scheduling Tests:    If your physician has ordered radiology tests such as MRI or CT scans, please contact Central Scheduling at [phone] right away to schedule the test.  Once scheduled, the [facility] Centralized Referral Team will work with your insurance carrier to obtain pre-certification or  prior authorization.  Depending on your insurance carrier, approval may take 3-10 days.  It is highly recommended patients assure they have received an authorization before having a test performed.  If test is done without insurance authorization, patient may be responsible for the entire amount billed.      Precertification and Prior Authorizations:  If your physician has recommended that you have a procedure or additional testing performed the [facility] Centralized Referral Team will contact your insurance carrier to obtain pre-certification or prior authorization.    You are strongly encouraged to contact your insurance carrier to verify that your procedure/test has been approved and is a COVERED benefit.  Although the [facility] Centralized Referral Team does its due diligence, the insurance carrier gives the disclaimer that \"Although the procedure is authorized, this does not guarantee payment.\"    Ultimately the patient is responsible for payment.   Thank you for your understanding in this matter.  Paperwork Completion:  If you require FMLA or disability paperwork for your recovery, please make sure to either drop it off or have it faxed to our office at [phone]. Be sure the form has your name and date of birth on it.  The form will be faxed to our Forms Department and they will complete it for you.  There is a 25$ fee for all forms that need to be filled out.  Please be aware there is a 10-14 day turnaround time.  You will need to sign a release of information (CASS) form if your paperwork does not come with one.  You may call the Forms Department with any questions at [phone].  Their fax number is [phone].

## 2025-04-30 NOTE — PROGRESS NOTES
[facility]  04/30/25    Chief Complaint   Patient presents with    Physical     Reviewed Preventative/Wellness form with patient.       HPI:   Bethany Barriga is a 49 year old female who presents for a complete physical exam.   Following with gynecology.     Med/Surg/Allergy/Fam hx updates: denied  Home: normally patient and her 16 year old son; 26 year son visiting for a few weeks, he is 26 years old, has been in residential programs for mental health  Work: [employer]  Activities/Hobbies: yes  Diet: healthy overall  Exercise: intermittent, walking  Sleep: fine - light sleeper; using sound machine  Mood: okay  Habits: Denied alcohol, tobacco, or drug use  Periods: No LMP recorded. (Menstrual status: Continuous Pill).  Immunizations: got flu shot; discussed tdap  Screenings: follows with gyne for pap smears    Lost 40lbs with [facility].    Patient presents for recheck of her hypertension. Pt has been taking medications as instructed, no medication side effects. No dizziness. No headaches.   BP Meds: lisinopril Tabs - 10 MG; minoxidil Tabs - 2.5 MG   Last Cr was 0.75 done on 8/7/2024.Last eGFR was 99 on 8/7/2024.    Hx vertigo in 2018.     Hx dandruff, using ketoconazole shampoo as needed.    Following with dermatology, due to hair loss.    Some increased throat clearing especially after eating. Wakes up with bad taste in her mouth.     Wt Readings from Last 6 Encounters:   04/30/25 154 lb (69.9 kg)   01/27/25 153 lb (69.4 kg)   12/31/24 160 lb (72.6 kg)   09/11/24 153 lb (69.4 kg)   08/19/24 155 lb (70.3 kg)   08/07/24 156 lb (70.8 kg)     Body mass index is 26.43 kg/m².     Results for orders placed or performed in visit on 12/31/24   Rapid Strep    Collection Time: 12/31/24  1:41 PM   Result Value Ref Range    Strep Grp A Screen Negative Negative    Control Line Present with a clear background (yes/no) Yes Yes/No    Kit Lot # [id number] Numeric    Kit Expiration Date 11/4/25 Date        Current Medications[1]   Allergies[2]   Past Medical History[3]   Past Surgical History[4]   Family History[5]   Social History:   Short Social Hx on File[6]       REVIEW OF SYSTEMS:   GENERAL: feels well otherwise  SKIN: denies any unusual skin lesions  EYES:denies blurred vision or double vision  HEENT: denies nasal congestion, sinus pain or ST  LUNGS: denies shortness of breath with exertion, denies cough  CARDIOVASCULAR: denies chest pain on exertion or at rest, denies palpitations  GI: denies abdominal pain,denies heartburn, denies n/v/d/c/blood in stool  : denies dysuria, vaginal discharge or itching,periods regular   MUSCULOSKELETAL: denies back pain  NEURO: denies headaches, denies LH/dizziness/syncope  PSYCHE: denies depression or anxiety  HEMATOLOGIC: denies hx of anemia  ENDOCRINE: denies thyroid history  ALL/ASTHMA: + hx of allergy    EXAM:   /78   Pulse 96   Temp 97.3 °F (36.3 °C) (Temporal)   Resp 18   Ht 5' 4\" (1.626 m)   Wt 154 lb (69.9 kg)   BMI 26.43 kg/m²   Body mass index is 26.43 kg/m².   GENERAL: well developed, well nourished,in no apparent distress  SKIN: no rash  HEENT: atraumatic, normocephalic,ears and throat with posterior rhinorrhea without erythema  EYES:PERRLA, EOMI,conjunctiva are clear  NECK: supple,no adenopathy,no thyromegaly  BREAST: deferred to gynecology  LUNGS: clear to auscultation; no rhonchi, rales, or wheezing  CARDIO: RRR without murmur  GI: good BS's, no masses, HSM or tenderness  : deferred to gynecology  MUSCULOSKELETAL: FROM of the back  EXTREMITIES: no cyanosis, clubbing or edema  NEURO: Oriented times three,cranial nerves are intact,motor and sensory are grossly intact; 2+ knee reflexes bilaterally  VASCULAR: 2+ posterior tibial pulses bilaterally    ASSESSMENT AND PLAN:   Bethany Barriga is a 49 year old female who presents for a complete physical exam.     Assessment & Plan  Wellness examination  - Pap and pelvic deferred to gynecology. Last  pap: 04/05/2024.   - Order for mammogram and dexascan per gynecology. Last mammogram: 07/05/2024.  - Self breast exam discussed.   - BP: at goal  - Pt' s weight is Body mass index is 26.43 kg/m²., c/w overweight BMI following with [facility], appreciate evaluation and recommendations.  - Healthy diet and exercise encouraged.   - Last colonoscopy: 05/15/2023. Up to date.  Orders:    CBC With Differential With Platelet; Future    Comp Metabolic Panel (14); Future    Lipid Panel; Future    TSH W Reflex To Free T4; Future    Urinalysis with Culture Reflex; Future    Benign essential hypertension  Pt presents for follow up of HTN  - no red flag symptoms  - BP controlled  - continue current regimen  - RTC 6mo or sooner if needed  Orders:    lisinopril 10 MG Oral Tab; Take 1 tablet (10 mg total) by mouth daily.    Dandruff  Use as needed, leave on for 5 minutes then rinse  Orders:    ketoconazole 2 % External Shampoo; Apply 1 Application topically twice a week.    Postnasal drip  Bad taste in mouth  Suspect laryngopharyngeal reflux. Try flonase and omeprazole for 1 month. Follow up if no improvement.   Orders:    Omeprazole 40 MG Oral Capsule Delayed Release; Take 1 capsule (40 mg total) by mouth daily.    fluticasone propionate 50 MCG/ACT Nasal Suspension; 2 sprays by Each Nare route daily.    Food insecurity                   The patient indicates understanding of these issues and agrees to the plan.      Health maintenance, will check:   Orders Placed This Encounter   Procedures    CBC With Differential With Platelet    Comp Metabolic Panel (14)    Lipid Panel    TSH W Reflex To Free T4    Urinalysis with Culture Reflex           Encounter Diagnoses   Name Primary?    Wellness examination Yes    Benign essential hypertension     Dandruff     Postnasal drip     Bad taste in mouth     Food insecurity        Meds & Refills for this Visit:  Requested Prescriptions     Signed Prescriptions Disp Refills    lisinopril 10 MG Oral Tab  90 tablet 1     Sig: Take 1 tablet (10 mg total) by mouth daily.    ketoconazole 2 % External Shampoo 120 mL 1     Sig: Apply 1 Application topically twice a week.    Omeprazole 40 MG Oral Capsule Delayed Release 30 capsule 0     Sig: Take 1 capsule (40 mg total) by mouth daily.    fluticasone propionate 50 MCG/ACT Nasal Suspension 16 mL 0     Si sprays by Each Nare route daily.       Imaging & Consults:  None      Return in about 1 year (around 2026) for annual physical, or sooner if needed.        Dafne Amezquita MD  [facility]  25      Please note that portions of this note may have been completed with a voice recognition program. Efforts were made to edit the dictations but occasionally words are mis-transcribed. Thank you for your understanding.    The  Century Cures Act makes medical notes like these available to patients in the interest of transparency. Please be advised this is a medical document. Medical documents are intended to carry relevant information, facts as evident, and the clinical opinion of the practitioner. The medical note is intended as peer to peer communication and may appear blunt or direct. It is written in medical language and may contain abbreviations or verbiage that are unfamiliar. If there are any questions or concerns please contact the provider for clarification.          [1]   Current Outpatient Medications   Medication Sig Dispense Refill    lisinopril 10 MG Oral Tab Take 1 tablet (10 mg total) by mouth daily. 90 tablet 1    [START ON 2025] ketoconazole 2 % External Shampoo Apply 1 Application topically twice a week. 120 mL 1    Omeprazole 40 MG Oral Capsule Delayed Release Take 1 capsule (40 mg total) by mouth daily. 30 capsule 0    fluticasone propionate 50 MCG/ACT Nasal Suspension 2 sprays by Each Nare route daily. 16 mL 0    buPROPion  MG Oral Tablet 24 Hr Take 1 tablet (300 mg total) by mouth daily. 90 tablet  1    Phentermine HCl 37.5 MG Oral Tab Take 1 tablet (37.5 mg total) by mouth every morning. 30 tablet 4    minoxidil 2.5 MG Oral Tab Take 1 tablet (2.5 mg total) by mouth daily. 30 tablet 2    Levonorgest-Eth Estrad 91-Day 0.15-0.03 &0.01 MG Oral Tab Take 1 tablet by mouth daily. 91 tablet 3    mometasone 0.1 % External Ointment       Ferrous Sulfate ER (SLOW FE) 142 (45 Fe) MG Oral Tab CR Take by mouth every other day.  0    Cholecalciferol (VITAMIN D) 25 MCG (1000 UT) Oral Tab Take 1,000 Units by mouth daily.        Fluocinonide 0.05 % External Solution Apply once daily as needed.  Max 2 weeks. 60 mL 2   [2]   Allergies  Allergen Reactions    Omnicef [Cefdinir] FACE FLUSHING    Penicillins FACE FLUSHING     Redness all over body, digestive issues and incontinence    [3]   Past Medical History:   Abdominal hernia    Left inguinal hernia repaired in 2003 and i currently have a right inguinal hernia diagnosed in 2018 but not repaired yet    Blood in urine    Microscopic amounts noted in routine urine samples over the last few years    Calculus of kidney    2 small stones noted    Change in hair    Hair loss    Decorative tattoo    Diarrhea, unspecified    During heavy antibiotic use and now very often after removal of gallbladder 6/21    Esophageal reflux    Frequent bowel movements    Heartburn    Hemorrhoid    Hemorrhoids    High blood pressure    Human papilloma virus infection    20 year ago per pt    Pain in joints    Knees    Pap smear for cervical cancer screening    negative hpv negative    PONV (postoperative nausea and vomiting)    Psoriasis    Stool incontinence    During heavy antibiotic use and now occasionally since gallbladder removal 6/21    Stress    I have a young adult son with severe mental health problems    Vertigo    Vertigo    Visual impairment    glasses    Wears glasses   [4]   Past Surgical History:  Procedure Laterality Date    Cholecystectomy  6/21    Colposcopy,bx cervix/endocerv curr   08/30/2021    Hernia surgery  10/01/2003    Left    Laparoscopic cholecystectomy  06/18/2021    Nasal scopy,remv part ethmoid  05/14/2021    Nasal scopy,rmv tiss maxill sinus Left 05/14/2021    Oral surgery      Other surgical history      Removal of ovarian cyst(s)  2007   [5]   Family History  Problem Relation Age of Onset    Heart Disorder Father     Other (HTN) Father     Diabetes Father     Hypertension Father         Both parents    Other (HTN) Mother     Diabetes Maternal Grandfather     Breast Cancer Paternal Grandmother 40        In her 40's    Cancer Maternal Uncle         esophageal dx age 50s    Cancer Maternal Uncle         bile ducts possble liver, dx age 60s    Prostate Cancer Maternal Uncle         dx age 50s    Ovarian Cancer Neg     Uterine Cancer Neg     Pancreatic Cancer Neg     Colon Cancer Neg     Infertility Neg     Endometriosis Neg    [6]   Social History  Socioeconomic History    Marital status:    Tobacco Use    Smoking status: Never     Passive exposure: Never    Smokeless tobacco: Never   Vaping Use    Vaping status: Never Used   Substance and Sexual Activity    Alcohol use: No    Drug use: No    Sexual activity: Not Currently     Birth control/protection: OCP   Other Topics Concern    Caffeine Concern Yes     Comment: 1 c. coffee/day    Stress Concern No    Weight Concern No    Exercise Yes     Comment: walking and active    Seat Belt Yes    Grew up on a farm No    History of tanning Yes    Outdoor occupation No    Breast feeding No    Reaction to local anesthetic No    Pt has a pacemaker No    Pt has a defibrillator No     Social Drivers of Health     Food Insecurity: Food Insecurity Present (4/30/2025)    NCSS - Food Insecurity     Worried About Running Out of Food in the Last Year: Yes     Ran Out of Food in the Last Year: Yes   Transportation Needs: No Transportation Needs (4/30/2025)    NCSS - Transportation     Lack of Transportation: No   Housing Stability: Not At Risk  (4/30/2025)    NCSS - Housing/Utilities     Has Housing: Yes     Worried About Losing Housing: No     Unable to Get Utilities: No

## 2025-04-30 NOTE — ASSESSMENT & PLAN NOTE
Pt presents for follow up of HTN  - no red flag symptoms  - BP controlled  - continue current regimen  - RTC 6mo or sooner if needed  Orders:    lisinopril 10 MG Oral Tab; Take 1 tablet (10 mg total) by mouth daily.

## 2025-05-09 RX ORDER — LEVONORGESTREL AND ETHINYL ESTRADIOL AND ETHINYL ESTRADIOL 150-30(84)
1 KIT ORAL DAILY
Qty: 91 TABLET | Refills: 3 | OUTPATIENT
Start: 2025-05-09

## 2025-05-12 ENCOUNTER — TELEPHONE (OUTPATIENT)
Facility: CLINIC | Age: 50
End: 2025-05-12

## 2025-05-12 RX ORDER — LEVONORGESTREL / ETHINYL ESTRADIOL AND ETHINYL ESTRADIOL 150-30(84)
1 KIT ORAL DAILY
Qty: 91 TABLET | Refills: 0 | Status: SHIPPED | OUTPATIENT
Start: 2025-05-12

## 2025-05-12 RX ORDER — LEVONORGESTREL AND ETHINYL ESTRADIOL AND ETHINYL ESTRADIOL 150-30(84)
1 KIT ORAL DAILY
Qty: 91 TABLET | Refills: 3 | OUTPATIENT
Start: 2025-05-12

## 2025-05-12 NOTE — TELEPHONE ENCOUNTER
Spoke with patient. Aware refill has been sent to [facility]. Verbalized understanding.

## 2025-05-12 NOTE — TELEPHONE ENCOUNTER
I believe she should be on 2.5 mg because I discussed with Dr. Franklin whether she can increase to 5 mg. Can we confirm with the patient?

## 2025-05-12 NOTE — TELEPHONE ENCOUNTER
Refill Request for medication(s):     Last Office Visit: 01/16/2025    Last Refill: 01/17/2025    Pharmacy, Dosage verified: per last TE pt was to stay on 1.25mg - please confirm, ANNALISA giles mentions 2.5mg - kindly confirm which dose she should be on

## 2025-05-12 NOTE — TELEPHONE ENCOUNTER
Per pt she just called and made her wwe but she says she needs her refill for her bc medication. Please advise and call it in. She needs her bc meds asap, today :). Thanks

## 2025-05-13 RX ORDER — MINOXIDIL 2.5 MG/1
2.5 TABLET ORAL DAILY
Qty: 90 TABLET | Refills: 0 | Status: SHIPPED | OUTPATIENT
Start: 2025-05-13

## 2025-06-03 ENCOUNTER — TELEMEDICINE (OUTPATIENT)
Dept: INTERNAL MEDICINE CLINIC | Facility: CLINIC | Age: 50
End: 2025-06-03
Payer: COMMERCIAL

## 2025-06-03 DIAGNOSIS — Z86.39 HISTORY OF OBESITY: ICD-10-CM

## 2025-06-03 DIAGNOSIS — E88.819 INSULIN RESISTANCE: ICD-10-CM

## 2025-06-03 DIAGNOSIS — Z51.81 ENCOUNTER FOR THERAPEUTIC DRUG MONITORING: Primary | ICD-10-CM

## 2025-06-03 DIAGNOSIS — E66.3 OVERWEIGHT (BMI 25.0-29.9): ICD-10-CM

## 2025-06-03 DIAGNOSIS — I10 PRIMARY HYPERTENSION: ICD-10-CM

## 2025-06-03 DIAGNOSIS — K21.9 GASTROESOPHAGEAL REFLUX DISEASE, UNSPECIFIED WHETHER ESOPHAGITIS PRESENT: ICD-10-CM

## 2025-06-03 PROCEDURE — 98005 SYNCH AUDIO-VIDEO EST LOW 20: CPT | Performed by: NURSE PRACTITIONER

## 2025-06-03 RX ORDER — BUPROPION HYDROCHLORIDE 300 MG/1
300 TABLET ORAL DAILY
Qty: 90 TABLET | Refills: 1 | Status: SHIPPED | OUTPATIENT
Start: 2025-06-03

## 2025-06-03 NOTE — PATIENT INSTRUCTIONS
Continue making lifestyle changes that focus on good nutrition, regular exercise and stress management.    Medication Plan: Continue current medication regimen. No refill needed on Phentermine at this time.    Next steps to work on before next office visit include: Great work in maintaining your weight loss! Continue to focus on building a healthy routine via fitness, balanced nutrition, stress and sleep management. Discussed [facility] called 60 in 60 at [facility].      I've Reached My Goal Weight! Now How Do I Maintain it?  February 1, 2019  Posted in Blog, Motivation  By Your Weight Matters Campaign     Congratulations! After what (might) feel like eons of hard, excruciating work -- physically and mentally -- you see a healthy number on the scale that leaves you glowing with pride. You're at the top of a mountain which took a whole lot of effort, endurance and determination to climb. But you did it! And now, you're left with the grueling question, “What comes next?”  Welcome to Weight Maintenance  There's no time to grow complacent, because you might find yourself sliding off the rails -- and you wouldn't want to unravel the difficult pounds you've successfully shed.  You've made it to the weight maintenance phase, but don't underestimate it. Did you know that nearly 65 percent of dieters return to their pre-dieting weight within three years?  In fact, for some, this stage of weight-loss can be even more difficult than losing the pounds to begin with. It might be because our bodies are biologically designed with a protection plan in place. Essentially, it defends its fat stores, looking for energy to replace what is lost. So, you've got to stay ahead of the curve and develop a protection plan of your own.  How to Maintain Your Goal Weight:  1) Schedule a Health Exam.  Remember, it's not all about the number on the scale! Schedule an exam with your primary care physician for an eval  of your health. If there are things you can still improve upon, like cholesterol or blood pressure, incorporate those goals into your weight management plan.  2) Keep Stepping on the Scale.  Don't even think about tossing the scale -- you still need it. Make sure you step on it regularly, whether it's daily or weekly, to keep a close eye on the number and how it's trending. You should be able to catch any red flags before they become a problem.  3) Set Health Challenges.  There's always room to improve on your health. Could you work on your strength? Speed? Endurance? Have you ever wanted to participate in a marathon or a triathlon? Ask yourself, “Beyond losing weight, what else can I do to be the best I can be?”  4) Develop a New Skill  Step outside your comfort zone and learn to do something you've never done before. Perhaps it's a marathon or triathlon. Maybe it's a dance class, spin class or even a full-on hiking excursion. Pick a skill or activity that will inspire you, motivate you and keep you pushing!  5) Keep Adjusting Your Behaviors  Even though you've lost the weight, it's important to keep an eye on maintaining healthy habits. This includes nutrition, exercise, sleep, stress management and more -- you know the drill.  The Work Doesn't End  Develop and cultivate the mindset that your weight-loss journey is one you'll stay on forever -- and it doesn't just end when you've reached your goal weight. Keeping the weight off is just the second leg of the adventure! Continue to measure non-scale victories and seek new, creative ways to boost your health and crush your goals. You've got this -- now keep up the great work!

## 2025-06-03 NOTE — PROGRESS NOTES
[facility] Weight Management follow up via video visit:    Subjective    This visit is conducted using Telemedicine with live, interactive video and audio.    Chief Complaint:  Routine follow up visit for lifestyle and medical management for overweight, obesity, or morbid obesity. LOV in clinic weight: 153#.    PMH reviewed. Bariatric surgery planned or hx of surgery in the past: 2/10 interest.    HPI:   Bethany Barriga is a 49 year old female who is being followed up today for lifestyle and medical management as deemed appropriate for overweight, obesity or morbid obesity. Patient reports weight loss monitoring at home via scale with a weight of 151#. This appears to be a weight loss since LOV 4 months ago in clinic. Patient has been consistent with Bupropion XL but only taking Phentermine PRN at a frequency of about 1x/week.    Questions/Concerns/Comments since LOV: LOV with Ranjan was in 12/2023. She is feeling good about maintaining weight loss. Remains inconsistent with fitness. Employed at [employer]. Has wellness labs ordered by PCP.    Lifestyle/Social Hx Reviewed: NO lifestyle log completed    ROS  General: feeling well, denies fatigue  EYES: denies vision changes or high pain/pressure.  CV: denies cp, palpitations  Resp: denies sob  GI: denies abdominal pain. Denies N/V/D/C.  Neuro: denies paresthesia or cognitive changes  Psych: denies any mood changes    Physical Exam:  >   BP Readings from Last 3 Encounters:   04/30/25 122/78   01/27/25 112/74   12/31/24 130/69       Home weight: see above  Home BP: not available  Home blood sugars: n/a    General: patient speaking in full sentences, no increased work of breathing. alert, appears stated age, cooperative, and no distress  HENT: normocephalic  Resp: Breathing is non labored  Psych: patient appears cheerful, smiling, making good eye contact    Diagnoses and all orders for this visit:    Encounter for therapeutic drug monitoring  -     buPROPion  MG Oral  Tablet 24 Hr; Take 1 tablet (300 mg total) by mouth daily.    Overweight (BMI 25.0-29.9)  -     buPROPion  MG Oral Tablet 24 Hr; Take 1 tablet (300 mg total) by mouth daily.    Primary hypertension    Gastroesophageal reflux disease, unspecified whether esophagitis present    Insulin resistance    History of obesity  Comments:  Baseline BMI: 35.84 (4/7/22)  Orders:  -     buPROPion  MG Oral Tablet 24 Hr; Take 1 tablet (300 mg total) by mouth daily.        Patient Instructions   Continue making lifestyle changes that focus on good nutrition, regular exercise and stress management.    Medication Plan: Continue current medication regimen. No refill needed on Phentermine at this time.    Next steps to work on before next office visit include: Great work in maintaining your weight loss! Continue to focus on building a healthy routine via fitness, balanced nutrition, stress and sleep management. Discussed [facility] called 60 in 60 at [facility].      I've Reached My Goal Weight! Now How Do I Maintain it?  February 1, 2019  Posted in Blog, Motivation  By Your Weight Matters Campaign     Congratulations! After what (might) feel like eons of hard, excruciating work -- physically and mentally -- you see a healthy number on the scale that leaves you glowing with pride. You're at the top of a mountain which took a whole lot of effort, endurance and determination to climb. But you did it! And now, you're left with the grueling question, “What comes next?”  Welcome to Weight Maintenance  There's no time to grow complacent, because you might find yourself sliding off the rails -- and you wouldn't want to unravel the difficult pounds you've successfully shed.  You've made it to the weight maintenance phase, but don't underestimate it. Did you know that nearly 65 percent of dieters return to their pre-dieting weight within three years?  In fact, for some, this stage of weight-loss can be  even more difficult than losing the pounds to begin with. It might be because our bodies are biologically designed with a protection plan in place. Essentially, it defends its fat stores, looking for energy to replace what is lost. So, you've got to stay ahead of the curve and develop a protection plan of your own.  How to Maintain Your Goal Weight:  1) Schedule a Health Exam.  Remember, it's not all about the number on the scale! Schedule an exam with your primary care physician for an eval of your health. If there are things you can still improve upon, like cholesterol or blood pressure, incorporate those goals into your weight management plan.  2) Keep Stepping on the Scale.  Don't even think about tossing the scale -- you still need it. Make sure you step on it regularly, whether it's daily or weekly, to keep a close eye on the number and how it's trending. You should be able to catch any red flags before they become a problem.  3) Set Health Challenges.  There's always room to improve on your health. Could you work on your strength? Speed? Endurance? Have you ever wanted to participate in a marathon or a triathlon? Ask yourself, “Beyond losing weight, what else can I do to be the best I can be?”  4) Develop a New Skill  Step outside your comfort zone and learn to do something you've never done before. Perhaps it's a marathon or triathlon. Maybe it's a dance class, spin class or even a full-on hiking excursion. Pick a skill or activity that will inspire you, motivate you and keep you pushing!  5) Keep Adjusting Your Behaviors  Even though you've lost the weight, it's important to keep an eye on maintaining healthy habits. This includes nutrition, exercise, sleep, stress management and more -- you know the drill.  The Work Doesn't End  Develop and cultivate the mindset that your weight-loss journey is one you'll stay on forever -- and it doesn't just end when you've reached your goal weight. Keeping the weight  off is just the second leg of the adventure! Continue to measure non-scale victories and seek new, creative ways to boost your health and crush your goals. You've got this -- now keep up the great work!      Return in about 2 months (around 8/3/2025) for as scheduled.    DOCUMENTATION OF TIME SPENT: Code selection for this visit was based on time spent : 20 minutes on date of service in preparing to see the patient, obtaining and/or reviewing separately obtained history, performing a medically appropriate examination, counseling and educating the patient/family/caregiver, ordering medications or testing, referring and communicating with other healthcare providers, documenting clinical information in the electronic medical record, independently interpreting results and communicating results to the patient/family/caregiver and care coordination with the patient's other providers.

## 2025-06-09 ENCOUNTER — OFFICE VISIT (OUTPATIENT)
Dept: OBGYN CLINIC | Facility: CLINIC | Age: 50
End: 2025-06-09
Payer: COMMERCIAL

## 2025-06-09 VITALS
BODY MASS INDEX: 25.78 KG/M2 | DIASTOLIC BLOOD PRESSURE: 70 MMHG | HEIGHT: 64 IN | WEIGHT: 151 LBS | SYSTOLIC BLOOD PRESSURE: 100 MMHG | HEART RATE: 83 BPM

## 2025-06-09 DIAGNOSIS — Z12.4 CERVICAL CANCER SCREENING: ICD-10-CM

## 2025-06-09 DIAGNOSIS — Z01.419 ENCOUNTER FOR WELL WOMAN EXAM WITH ROUTINE GYNECOLOGICAL EXAM: Primary | ICD-10-CM

## 2025-06-09 DIAGNOSIS — Z12.31 BREAST CANCER SCREENING BY MAMMOGRAM: ICD-10-CM

## 2025-06-09 PROCEDURE — 99396 PREV VISIT EST AGE 40-64: CPT

## 2025-06-09 PROCEDURE — 88175 CYTOPATH C/V AUTO FLUID REDO: CPT

## 2025-06-09 PROCEDURE — 87624 HPV HI-RISK TYP POOLED RSLT: CPT

## 2025-06-09 PROCEDURE — 99459 PELVIC EXAMINATION: CPT

## 2025-06-09 RX ORDER — LEVONORGESTREL / ETHINYL ESTRADIOL AND ETHINYL ESTRADIOL 150-30(84)
1 KIT ORAL DAILY
Qty: 91 TABLET | Refills: 3 | Status: SHIPPED | OUTPATIENT
Start: 2025-06-09

## 2025-06-09 NOTE — PROGRESS NOTES
Bethany Barriga is a 49 year old female  No LMP recorded. (Menstrual status: Continuous Pill).   Chief Complaint   Patient presents with    Wellness Visit    Other     Reviewed Preventative/Wellness form with patient    Contraception     Refill of birth control pills.   .  PCP: Dr Amezquita   Last Pap: 2024 HPV neg, pap normal   Last Mammogram: 2024    OBSTETRICS HISTORY:  OB History    Para Term  AB Living   2 2 2   2   SAB IAB Ectopic Multiple Live Births       2      # Outcome Date GA Lbr Aaron/2nd Weight Sex Type Anes PTL Lv   2 Term 08 40w0d   M NORMAL SPONT   DAI   1 Term 99 40w0d   M NORMAL SPONT   DAI       GYNE HISTORY:  Periods absent    History   Sexual Activity    Sexual activity: Not Currently    Birth control/ protection: OCP        Pap Date: 24  Pap Result Notes: neg/neg        MEDICAL HISTORY:  Past Medical History[1]    SURGICAL HISTORY:  Past Surgical History[2]    SOCIAL HISTORY:  Social History     Socioeconomic History    Marital status:      Spouse name: Not on file    Number of children: Not on file    Years of education: Not on file    Highest education level: Not on file   Occupational History    Not on file   Tobacco Use    Smoking status: Never     Passive exposure: Never    Smokeless tobacco: Never   Vaping Use    Vaping status: Never Used   Substance and Sexual Activity    Alcohol use: No    Drug use: No    Sexual activity: Not Currently     Birth control/protection: OCP   Other Topics Concern     Service Not Asked    Blood Transfusions Not Asked    Caffeine Concern Yes     Comment: 1 c. coffee/day    Occupational Exposure Not Asked    Hobby Hazards Not Asked    Sleep Concern Not Asked    Stress Concern No    Weight Concern No    Special Diet Not Asked    Back Care Not Asked    Exercise Yes     Comment: walking and active    Bike Helmet Not Asked    Seat Belt Yes    Self-Exams Not Asked    Grew up on a farm No    History of  tanning Yes    Outdoor occupation No    Breast feeding No    Reaction to local anesthetic No    Pt has a pacemaker No    Pt has a defibrillator No   Social History Narrative    Not on file     Social Drivers of Health     Food Insecurity: Food Insecurity Present (4/30/2025)    NCSS - Food Insecurity     Worried About Running Out of Food in the Last Year: Yes     Ran Out of Food in the Last Year: Yes   Transportation Needs: No Transportation Needs (4/30/2025)    NCSS - Transportation     Lack of Transportation: No   Stress: Not on file   Housing Stability: Not At Risk (4/30/2025)    NCSS - Housing/Utilities     Has Housing: Yes     Worried About Losing Housing: No     Unable to Get Utilities: No       FAMILY HISTORY:  Family History[3]    MEDICATIONS:  Medications - Current[4]    ALLERGIES:  Allergies[5]      Review of Systems:  Constitutional:  Denies fatigue, night sweats, hot flashes  Eyes:  denies blurred or double vision  Cardiovascular:  denies chest pain or palpitations  Respiratory:  denies shortness of breath  Gastrointestinal:  denies heartburn, abdominal pain, diarrhea or constipation  Genitourinary:  denies dysuria, incontinence, abnormal vaginal discharge, vaginal itching  Musculoskeletal:  denies back pain.  Skin/Breast:  Denies any breast pain, lumps, or discharge.   Neurological:  denies headaches, extremity weakness or numbness.  Psychiatric: denies depression or anxiety.  Endocrine:   denies excessive thirst or urination.  Heme/Lymph:  denies history of anemia, easy bruising or bleeding.      PHYSICAL EXAM:   Constitutional: well developed, well nourished  Head/Face: normocephalic  Neck/Thyroid: thyroid symmetric, no thyromegaly, no nodules, no adenopathy  Lymphatic:no abnormal supraclavicular or axillary adenopathy is noted  Breast: normal without palpable masses, tenderness, asymmetry, nipple discharge, nipple retraction or skin changes  Abdomen:  soft, nontender, nondistended, no  masses  Skin/Hair: no unusual rashes or bruises  Extremities: no edema, no cyanosis  Psychiatric:  Oriented to time, place, person and situation. Appropriate mood and affect    Pelvic Exam:  External Genitalia: normal appearance, hair distribution, and no lesions  Urethral Meatus:  normal in size, location, without lesions and prolapse  Bladder:  No fullness, masses or tenderness  Vagina:  Normal appearance without lesions, no abnormal discharge  Cervix:  Normal without tenderness on motion  Uterus: normal in size, contour, position, mobility, without tenderness  Adnexa: normal without masses or tenderness  Perineum: normal  Anus: no hemorroids     Assessment & Plan:  Diagnoses and all orders for this visit:    Encounter for well woman exam with routine gynecological exam  -     Levonorgest-Eth Estrad 91-Day 0.15-0.03 &0.01 MG Oral Tab; Take 1 tablet by mouth daily.    Breast cancer screening by mammogram  -     [facility] INDIGO 2D+3D SCREENING BILAT (CPT=77067/02939); Future    Cervical cancer screening  -     Hpv High Risk , Thin Prep Collect; Future  -     ThinPrep PAP Smear B; Future                   [1]   Past Medical History:   Abdominal hernia    Left inguinal hernia repaired in 2003 and i currently have a right inguinal hernia diagnosed in 2018 but not repaired yet    Blood in urine    Microscopic amounts noted in routine urine samples over the last few years    Calculus of kidney    2 small stones noted    Change in hair    Hair loss    Decorative tattoo    Diarrhea, unspecified    During heavy antibiotic use and now very often after removal of gallbladder 6/21    Esophageal reflux    Frequent bowel movements    Heartburn    Hemorrhoid    Hemorrhoids    High blood pressure    Human papilloma virus infection    20 year ago per pt    Pain in joints    Knees    Pap smear for cervical cancer screening    negative hpv negative    PONV (postoperative nausea and vomiting)    Psoriasis    Stool incontinence    During  heavy antibiotic use and now occasionally since gallbladder removal 6/21    Stress    I have a young adult son with severe mental health problems    Vertigo    Vertigo    Visual impairment    glasses    Wears glasses   [2]   Past Surgical History:  Procedure Laterality Date    Cholecystectomy  6/21    Colposcopy,bx cervix/endocerv curr  08/30/2021    Hernia surgery  10/01/2003    Left    Laparoscopic cholecystectomy  06/18/2021    Nasal scopy,remv part ethmoid  05/14/2021    Nasal scopy,rmv tiss maxill sinus Left 05/14/2021    Oral surgery      Other surgical history      Removal of ovarian cyst(s)  2007   [3]   Family History  Problem Relation Age of Onset    Heart Disorder Father     Other (HTN) Father     Diabetes Father     Hypertension Father         Both parents    Other (HTN) Mother     Diabetes Maternal Grandfather     Breast Cancer Paternal Grandmother 40        In her 40's    Cancer Maternal Uncle         esophageal dx age 50s    Cancer Maternal Uncle         bile ducts possble liver, dx age 60s    Prostate Cancer Maternal Uncle         dx age 50s    Ovarian Cancer Neg     Uterine Cancer Neg     Pancreatic Cancer Neg     Colon Cancer Neg     Infertility Neg     Endometriosis Neg    [4]   Current Outpatient Medications:     Levonorgest-Eth Estrad 91-Day 0.15-0.03 &0.01 MG Oral Tab, Take 1 tablet by mouth daily., Disp: 91 tablet, Rfl: 3    buPROPion  MG Oral Tablet 24 Hr, Take 1 tablet (300 mg total) by mouth daily., Disp: 90 tablet, Rfl: 1    MINOXIDIL 2.5 MG Oral Tab, TAKE 1 TABLET BY MOUTH EVERY DAY, Disp: 90 tablet, Rfl: 0    lisinopril 10 MG Oral Tab, Take 1 tablet (10 mg total) by mouth daily., Disp: 90 tablet, Rfl: 1    ketoconazole 2 % External Shampoo, Apply 1 Application topically twice a week., Disp: 120 mL, Rfl: 1    Phentermine HCl 37.5 MG Oral Tab, Take 1 tablet (37.5 mg total) by mouth every morning., Disp: 30 tablet, Rfl: 4    mometasone 0.1 % External Ointment, , Disp: , Rfl:      Omeprazole 40 MG Oral Capsule Delayed Release, Take 1 capsule (40 mg total) by mouth daily. (Patient not taking: Reported on 6/9/2025), Disp: 30 capsule, Rfl: 0    fluticasone propionate 50 MCG/ACT Nasal Suspension, 2 sprays by Each Nare route daily. (Patient not taking: Reported on 6/9/2025), Disp: 16 mL, Rfl: 0    Ferrous Sulfate ER (SLOW FE) 142 (45 Fe) MG Oral Tab CR, Take by mouth every other day. (Patient not taking: Reported on 6/9/2025), Disp: , Rfl: 0    Cholecalciferol (VITAMIN D) 25 MCG (1000 UT) Oral Tab, Take 1,000 Units by mouth daily.   (Patient not taking: Reported on 6/9/2025), Disp: , Rfl:     Fluocinonide 0.05 % External Solution, Apply once daily as needed.  Max 2 weeks. (Patient not taking: Reported on 6/9/2025), Disp: 60 mL, Rfl: 2  [5]   Allergies  Allergen Reactions    Omnicef [Cefdinir] FACE FLUSHING    Penicillins FACE FLUSHING     Redness all over body, digestive issues and incontinence

## 2025-06-10 LAB — HPV E6+E7 MRNA CVX QL NAA+PROBE: NEGATIVE

## 2025-08-18 ENCOUNTER — OFFICE VISIT (OUTPATIENT)
Dept: INTERNAL MEDICINE CLINIC | Facility: CLINIC | Age: 50
End: 2025-08-18

## 2025-08-18 VITALS
HEIGHT: 64.5 IN | RESPIRATION RATE: 16 BRPM | DIASTOLIC BLOOD PRESSURE: 76 MMHG | SYSTOLIC BLOOD PRESSURE: 128 MMHG | BODY MASS INDEX: 25.46 KG/M2 | HEART RATE: 82 BPM | WEIGHT: 151 LBS

## 2025-08-18 DIAGNOSIS — Z51.81 ENCOUNTER FOR THERAPEUTIC DRUG MONITORING: Primary | ICD-10-CM

## 2025-08-18 DIAGNOSIS — E66.3 OVERWEIGHT (BMI 25.0-29.9): ICD-10-CM

## 2025-08-18 DIAGNOSIS — Z86.39 HISTORY OF OBESITY: ICD-10-CM

## 2025-08-18 PROCEDURE — 99213 OFFICE O/P EST LOW 20 MIN: CPT | Performed by: NURSE PRACTITIONER

## 2025-08-18 RX ORDER — PHENTERMINE HYDROCHLORIDE 37.5 MG/1
37.5 TABLET ORAL EVERY MORNING
Qty: 30 TABLET | Refills: 5 | Status: SHIPPED | OUTPATIENT
Start: 2025-08-18

## 2025-08-18 RX ORDER — BUPROPION HYDROCHLORIDE 300 MG/1
300 TABLET ORAL DAILY
Qty: 90 TABLET | Refills: 1 | Status: CANCELLED | OUTPATIENT
Start: 2025-08-18

## 2025-08-21 RX ORDER — MINOXIDIL 2.5 MG/1
2.5 TABLET ORAL DAILY
Qty: 90 TABLET | Refills: 0 | Status: SHIPPED | OUTPATIENT
Start: 2025-08-21

## 2025-08-25 ENCOUNTER — OFFICE VISIT (OUTPATIENT)
Dept: DERMATOLOGY CLINIC | Facility: CLINIC | Age: 50
End: 2025-08-25

## 2025-08-25 DIAGNOSIS — Z12.83 SCREENING EXAM FOR SKIN CANCER: Primary | ICD-10-CM

## 2025-08-25 DIAGNOSIS — L65.9 HAIR LOSS: ICD-10-CM

## 2025-08-25 DIAGNOSIS — D22.9 MULTIPLE NEVI: ICD-10-CM

## 2025-08-25 RX ORDER — MINOXIDIL 2.5 MG/1
3.75 TABLET ORAL DAILY
Qty: 135 TABLET | Refills: 0 | Status: SHIPPED | OUTPATIENT
Start: 2025-08-25

## 2025-08-27 ENCOUNTER — HOSPITAL ENCOUNTER (OUTPATIENT)
Dept: MAMMOGRAPHY | Age: 50
Discharge: HOME | End: 2025-08-27

## 2025-08-27 DIAGNOSIS — Z12.31 BREAST CANCER SCREENING BY MAMMOGRAM: ICD-10-CM

## 2025-08-27 PROCEDURE — 77067 SCR MAMMO BI INCL CAD: CPT

## 2025-08-27 PROCEDURE — 77063 BREAST TOMOSYNTHESIS BI: CPT

## (undated) DIAGNOSIS — L65.9 HAIR LOSS: Primary | ICD-10-CM

## (undated) DIAGNOSIS — E61.1 LOW SERUM IRON: Primary | ICD-10-CM

## (undated) DEVICE — TROCAR: Brand: KII® SLEEVE

## (undated) DEVICE — TIGERTAIL 5F FLXTIP 70CM

## (undated) DEVICE — ZIPWIRE GUIDEWIRE .035X150 STR

## (undated) DEVICE — CAUTERY PENCIL

## (undated) DEVICE — SHEATH 1912041 5PK ES2 STRYKER 4MM/30DEG: Brand: ENDO-SCRUB®

## (undated) DEVICE — GAMMEX® NON-LATEX PI ORTHO SIZE 6.5, STERILE POLYISOPRENE POWDER-FREE SURGICAL GLOVE: Brand: GAMMEX

## (undated) DEVICE — SUTURE CHROMIC GUT 4-0 P-3

## (undated) DEVICE — NASAL ACCESSORY: Brand: MEDLINE INDUSTRIES, INC.

## (undated) DEVICE — ELECTROSURGICAL SUCTION COAGULATOR, 10FR

## (undated) DEVICE — SUCTION CANISTER, 3000CC,SAFELINER: Brand: DEROYAL

## (undated) DEVICE — TROCAR: Brand: KII FIOS FIRST ENTRY

## (undated) DEVICE — SUTURE SILK 2-0 623H

## (undated) DEVICE — INSTRUMENT TRACKER 9733533XOM ENT 1PK

## (undated) DEVICE — PATIENT TRACKER 9734887XOM NON-INVASIVE

## (undated) DEVICE — SOL  .9 1000ML BTL

## (undated) DEVICE — BLADE 1884380EM QUADCUT 4.3MMX13CM ROHS: Brand: ROTATABLE FUSION®

## (undated) DEVICE — REM POLYHESIVE ADULT PATIENT RETURN ELECTRODE: Brand: VALLEYLAB

## (undated) DEVICE — SYRINGE 10ML LL CONTRL SYRINGE

## (undated) DEVICE — CHLORAPREP 26ML APPLICATOR

## (undated) DEVICE — TISSUE RETRIEVAL SYSTEM: Brand: INZII RETRIEVAL SYSTEM

## (undated) DEVICE — C-ARM: Brand: UNBRANDED

## (undated) DEVICE — SUTURE MONOCRYL 4-0 PS-2

## (undated) DEVICE — STERILE SYNTHETIC POLYISOPRENE POWDER-FREE SURGICAL GLOVES WITH HYDROGEL COATING: Brand: PROTEXIS

## (undated) DEVICE — SCD SLEEVE KNEE HI BLEND

## (undated) DEVICE — DRAPE HALF 40X58 DYNJP2410

## (undated) DEVICE — Device

## (undated) DEVICE — HEAD & NECK: Brand: MEDLINE INDUSTRIES, INC.

## (undated) DEVICE — LAP CHOLE/APPY CDS-LF: Brand: MEDLINE INDUSTRIES, INC.

## (undated) DEVICE — SUTURE PLAIN GUT 4-0 SC-1

## (undated) DEVICE — EYE PADSSTERILENOT MADE WITH NATURAL RUBBER LATEXSINGLE USE ONLYDO NOT USE IF PACKAGE OPENED OR DAMAGED: Brand: CARDINAL HEALTH

## (undated) DEVICE — SHEATH 1912040 5PK ES2 STRYKER 4MM/0DEG: Brand: ENDO-SCRUB®

## (undated) DEVICE — DISPOSABLE LAPAROSCOPIC CLIP APPLIER WITH 20 CLIPS.: Brand: EPIX® UNIVERSAL CLIP APPLIER

## (undated) DEVICE — 40580 - THE PINK PAD - ADVANCED TRENDELENBURG POSITIONING KIT: Brand: 40580 - THE PINK PAD - ADVANCED TRENDELENBURG POSITIONING KIT

## (undated) DEVICE — SOL  .9 1000ML BAG

## (undated) DEVICE — WIPE WITH WICK AND CORNEAL SHIELD: Brand: MEROCEL

## (undated) DEVICE — MEDI-VAC NON-CONDUCTIVE SUCTION TUBING: Brand: CARDINAL HEALTH

## (undated) DEVICE — TROCARS: Brand: KII® BALLOON BLUNT TIP SYSTEM

## (undated) DEVICE — LIGHT HANDLE

## (undated) DEVICE — VIOLET BRAIDED (POLYGLACTIN 910), SYNTHETIC ABSORBABLE SUTURE: Brand: COATED VICRYL

## (undated) NOTE — LETTER
GENEVIEVE Navarrete:[birth year]    CONSENT FOR PROCEDURE/SEDATION    1. I authorize the performance upon Claudette Lundberg  the following: Colposcopy    2.  I authorize Dr. Mame Gibbs MD (and whomever is designated as the doctor’s assistant), to perform _________________________________________ Date:___________     Physician Signature: _______________________________ Date:___________

## (undated) NOTE — Clinical Note
I had the pleasure of seeing Wiley Cox on 7/12/2021. Please see my attached note.     Etelvina Victoria MD FACS  [facility]

## (undated) NOTE — MR AVS SNAPSHOT
[facility] [address] [place] [address]  [address] [place] [address]  [phone]               Thank you for choosing us for your health care visit with JENAE Lee. We are glad to serve you and happy to provide you with this summary of your visit.   Ple Gargling with warm salt water will also reduce throat pain. Dissolve 1/2 teaspoon of salt in 1 glass of warm water. This may be useful just before meals.   · Soft foods are okay. Avoid salty or spicy foods.   Follow-up care  Follow up with your healthcare p cholecalciferol 5000 units Caps   Take 1 capsule by mouth daily. Commonly known as:  VITAMIN D3           ergocalciferol 76649 units Caps   Take 1 capsule (50,000 Units total) by mouth once a week.    Commonly known as:  DRISDOL/VITAMIN D2           Fluo Water is best for hydration Fast food. Eat at home when possible     Tips for increasing your physical activity – Adults who are physically active are less likely to develop some chronic diseases than adults who are inactive.      HOW TO GET STARTED: HOW

## (undated) NOTE — MR AVS SNAPSHOT
After Visit Summary   12/15/2020    Martine Alas    MRN: [id number]           Visit Information     Date & Time  12/15/2020 12:45 PM Provider  DO Scar Aguilar  [address]  Dept[address]  Phone  [phone]      Your [facility] now offers Video Visits through [address] for adult and pediatric patients. Video Visits are available Monday - Friday for many common conditions such as allergies, colds, cough, fever, rash, sore throat, headache and pink eye.   The cost for a Video [facility] [employer]   Monday – Friday  4:00 pm – 10:00 pm   Saturday – Sunday  10:00 am – 4:00 pm  WALK-IN CARE  Emergency Medicine Providers  Conditions needing urgent attention, but are   non-life-threatening.     Also available by appointment Average cost  $120*

## (undated) NOTE — MR AVS SNAPSHOT
[facility], [address] [address] [phone]               Thank you for choosing us for your health care visit with Jasmin Baldwin PA-C.   We are glad to serve you and happy to provide you with this both hands [id number], M79.642], Pain in both wrists [M25.531, M25.532]                 Referral Details     Referred By    Referred To    MERE Lombardi [address] [facility]   Phone:  [phone]    Diagnoses:  Pain in

## (undated) NOTE — LETTER
18    Patient: Gretchen Owens  : 1975 Visit date: 2018    Dear  Dr. Bereket Martin MD,    Thank you for referring Gretchen Owens to my practice. Please find my assessment and plan below.                 History of Present Illness   43-year-o

## (undated) NOTE — Clinical Note
Thank you for referring Ericka Arreguin to the [facility]. I met with her in consultation today via person. I have ordered labs, referred for a nutrition consultation with our dietician. She was started on Qsymia for medication therapy and will follow-up with me in about 8 weeks.

## (undated) NOTE — Clinical Note
Dear Dr. Audra Phalen,  Thank you for referring Nick Sender to the [facility] in [facility].   Sincerely,  Tunde Tejeda NP

## (undated) NOTE — LETTER
[facility] Department of OB/GYN  After Care Instructions for Colposcopy/Biopsy      Biopsy Results   You will receive a phone call with your biopsy results in 7 business days. If you have not received your biopsy results in 7 days, please contact our office.   Mateo Sears

## (undated) NOTE — MR AVS SNAPSHOT
After Visit Summary   11/7/2019    Munir Dia    MRN: [id number]           Visit Information     Date & Time  11/7/2019  1:30 PM Provider  Kim Wu [facility]  [address]  Dept.  Phone  [phone]      [facility] at [phone]. November 8, 2019      [address]  [facility]     Dear Myron Garza : Thank you for enrolling in [address]  Please follow the instructions below to securely patients. Video Visits are available Monday - Friday for many common conditions such as allergies, colds, cough, fever, rash, sore throat, headache and pink eye.   The cost for a Video Visit is currently $35.         If you receive a survey from [employer] *Cost varies based on your insurance coverage  For more information about hours, locations or appointment options available at [facility],  visit: [email or web address] or call [phone]. ([phone])

## (undated) NOTE — Clinical Note
I had the pleasure of seeing Susi Jain on 2/1/2021. Please see my attached note.     Nixon Blackmon MD FACS  [facility]

## (undated) NOTE — LETTER
08/12/21        [address]  [address]  [facility]      Dear Sarah Beth Quick,    [address] records indicate that you have outstanding lab work and or testing that was ordered for you and has not yet been completed:  Orders Placed This Encoun

## (undated) NOTE — MR AVS SNAPSHOT
[facility], [address] [id number]               Thank you for choosing us for your health care visit with Anthony Carrera PA-C.   We are glad to serve you and happy to provide you with this documented DELAYED GASTRIC EMPTYING should discontinue formula 6 hours before arrival time. FOR ADULTS The evening prior to the exam patient should have a fat free dinner (NO FRIED FOODS or DAIRY PRODUCTS).  Patient should have nothing to eat or drink 6-8 your appointment immediately. However, if you are unsure about the requirements for authorization, please wait 5-7 days and then contact your physician's office.  At that time, you will be provided with any authorization numbers or be assured that none are